# Patient Record
Sex: FEMALE | Race: WHITE | NOT HISPANIC OR LATINO | Employment: OTHER | URBAN - METROPOLITAN AREA
[De-identification: names, ages, dates, MRNs, and addresses within clinical notes are randomized per-mention and may not be internally consistent; named-entity substitution may affect disease eponyms.]

---

## 2017-02-06 ENCOUNTER — HOSPITAL ENCOUNTER (OUTPATIENT)
Dept: RADIOLOGY | Facility: HOSPITAL | Age: 67
Discharge: HOME | End: 2017-02-06
Attending: FAMILY MEDICINE
Payer: MEDICARE

## 2017-02-06 DIAGNOSIS — Z12.31 ENCOUNTER FOR SCREENING MAMMOGRAM FOR MALIGNANT NEOPLASM OF BREAST: ICD-10-CM

## 2017-02-06 PROCEDURE — 77067 SCR MAMMO BI INCL CAD: CPT | Mod: TC

## 2017-02-06 PROCEDURE — 77067 SCR MAMMO BI INCL CAD: CPT | Mod: 26,,, | Performed by: RADIOLOGY

## 2017-02-06 PROCEDURE — 77063 BREAST TOMOSYNTHESIS BI: CPT | Mod: 26,,, | Performed by: RADIOLOGY

## 2017-06-28 ENCOUNTER — OFFICE VISIT (OUTPATIENT)
Dept: ORTHOPEDICS | Facility: CLINIC | Age: 67
End: 2017-06-28
Payer: MEDICARE

## 2017-06-28 VITALS
DIASTOLIC BLOOD PRESSURE: 95 MMHG | WEIGHT: 148 LBS | HEIGHT: 62 IN | SYSTOLIC BLOOD PRESSURE: 156 MMHG | BODY MASS INDEX: 27.23 KG/M2 | HEART RATE: 75 BPM

## 2017-06-28 DIAGNOSIS — M23.301 LATERAL MENISCUS DERANGEMENT, LEFT: Primary | ICD-10-CM

## 2017-06-28 PROCEDURE — 73560 X-RAY EXAM OF KNEE 1 OR 2: CPT | Mod: LT,,, | Performed by: ORTHOPAEDIC SURGERY

## 2017-06-28 PROCEDURE — 1159F MED LIST DOCD IN RCRD: CPT | Mod: ,,, | Performed by: ORTHOPAEDIC SURGERY

## 2017-06-28 PROCEDURE — 99213 OFFICE O/P EST LOW 20 MIN: CPT | Mod: ,,, | Performed by: ORTHOPAEDIC SURGERY

## 2017-06-28 PROCEDURE — 1125F AMNT PAIN NOTED PAIN PRSNT: CPT | Mod: ,,, | Performed by: ORTHOPAEDIC SURGERY

## 2017-06-28 RX ORDER — PITAVASTATIN CALCIUM 2.09 MG/1
TABLET, FILM COATED ORAL
COMMUNITY
End: 2019-08-29

## 2017-06-28 RX ORDER — ESTRADIOL 0.5 MG/1
TABLET ORAL
Refills: 5 | COMMUNITY
Start: 2017-04-05 | End: 2019-09-11 | Stop reason: SDUPTHER

## 2017-06-28 RX ORDER — RISEDRONATE SODIUM 35 MG/1
TABLET, DELAYED RELEASE ORAL
COMMUNITY
Start: 2017-06-06 | End: 2019-09-11 | Stop reason: SDUPTHER

## 2017-06-28 NOTE — PROGRESS NOTES
Past Medical History:   Diagnosis Date    Hyperlipidemia     Osteopenia        Past Surgical History:   Procedure Laterality Date    basil cell carcinoma removal      HYSTERECTOMY      SINUS SURGERY         Current Outpatient Prescriptions   Medication Sig    estradiol (ESTRACE) 0.5 MG tablet TK 1 T PO QD    pitavastatin (LIVALO) 2 mg Tab tablet Take by mouth.    risedronate 35 mg TbEC      No current facility-administered medications for this visit.        Review of patient's allergies indicates:   Allergen Reactions    Alendronic acid     Hydrocodone-acetaminophen     Hydrocodone-cpm-pseudoephed     Ibandronic acid     Lovastatin     Oxycodone     Risedronic acid     Rosuvastatin        History reviewed. No pertinent family history.    Social History     Social History    Marital status:      Spouse name: N/A    Number of children: N/A    Years of education: N/A     Occupational History    Not on file.     Social History Main Topics    Smoking status: Former Smoker    Smokeless tobacco: Never Used    Alcohol use Yes      Comment: socially    Drug use: No    Sexual activity: Yes     Partners: Male     Other Topics Concern    Not on file     Social History Narrative    No narrative on file       Chief Complaint:   Chief Complaint   Patient presents with    Left Knee - Swelling, Pain, Injury     DOI: 06/27/2017; She was getting out of the truck that is high off the ground and her foot got caught on the door jam, and she felt pain that travels to the upper back thigh and lower part of the calf. She had pain for about 1 week before incident        Consulting Physician: No ref. provider found    History of Present Illness:    This is a 66 y.o. year old female who complains of Patient twisted her left knee yesterday getting out of a truck and now complains of severe pain 10 out of 10 with any flexion past 15°      ROS    Examination:    Vital Signs:    Vitals:    06/28/17 1052   BP: (!)  "156/95   Pulse: 75   Weight: 67.1 kg (148 lb)   Height: 5' 2" (1.575 m)   PainSc:   4   PainLoc: Knee       This a well-developed, well nourished patient in no acute distress.    Alert and oriented and cooperative to examination.       Physical Exam: Left Knee Exam    Gait   Normal    Skin  Rash:   None  Scars:   None    Inspection  Erythema:  None  Bruising:  None  Effusion:  Patient has a moderate effusion  Masses:  None  Lymphadenopathy: None    Range of Motion: atient has limited range of motion with only 15° of flexion and with severe lateral knee pain°    Medial Joint : None  Lateral Joint : None    Patellofemoral Tenderness: None  Patellofemoral Crepitus: None    Lachman:  Normal  Anterior Drawer: Normal  Posterior Drawer: Normal    Yoli's:  Positive with lateral knee pain  Apley's:  Negative    Varus Stress:  Stable  Valgus Stress:  Stable    Strength:  5/5    Pulses:  Palpable distal    Sensation:  Intact          Imaging: X-rays ordered and reviewed today x-ray examination of the left knee show some mild degenerative changes but no acute changes     Assessment: ossible lateral meniscal tear left knee    Plan:  ecommend MRI of the left knee      DISCLAIMER: This note may have been dictated using voice recognition software and may contain grammatical errors.     NOTE: Consult report sent to referring provider via [employer] EMR.  "

## 2017-07-10 ENCOUNTER — OFFICE VISIT (OUTPATIENT)
Dept: ORTHOPEDICS | Facility: CLINIC | Age: 67
End: 2017-07-10
Payer: MEDICARE

## 2017-07-10 VITALS
WEIGHT: 148 LBS | HEART RATE: 91 BPM | BODY MASS INDEX: 27.23 KG/M2 | DIASTOLIC BLOOD PRESSURE: 72 MMHG | HEIGHT: 62 IN | SYSTOLIC BLOOD PRESSURE: 122 MMHG

## 2017-07-10 DIAGNOSIS — M17.10 ARTHRITIS OF KNEE: Primary | ICD-10-CM

## 2017-07-10 DIAGNOSIS — M17.12 PRIMARY OSTEOARTHRITIS OF LEFT KNEE: ICD-10-CM

## 2017-07-10 PROCEDURE — 99213 OFFICE O/P EST LOW 20 MIN: CPT | Mod: 25,,, | Performed by: ORTHOPAEDIC SURGERY

## 2017-07-10 PROCEDURE — 1159F MED LIST DOCD IN RCRD: CPT | Mod: ,,, | Performed by: ORTHOPAEDIC SURGERY

## 2017-07-10 PROCEDURE — 20610 DRAIN/INJ JOINT/BURSA W/O US: CPT | Mod: LT,,, | Performed by: ORTHOPAEDIC SURGERY

## 2017-07-10 RX ORDER — TRIAMCINOLONE ACETONIDE 40 MG/ML
40 INJECTION, SUSPENSION INTRA-ARTICULAR; INTRAMUSCULAR
Status: DISCONTINUED | OUTPATIENT
Start: 2017-07-10 | End: 2017-07-10 | Stop reason: HOSPADM

## 2017-07-10 RX ADMIN — TRIAMCINOLONE ACETONIDE 40 MG: 40 INJECTION, SUSPENSION INTRA-ARTICULAR; INTRAMUSCULAR at 10:07

## 2017-07-10 NOTE — PROGRESS NOTES
"Past Medical History:   Diagnosis Date    Hyperlipidemia     Osteopenia        Past Surgical History:   Procedure Laterality Date    basil cell carcinoma removal      HYSTERECTOMY      SINUS SURGERY         Current Outpatient Prescriptions   Medication Sig    estradiol (ESTRACE) 0.5 MG tablet TK 1 T PO QD    pitavastatin (LIVALO) 2 mg Tab tablet Take by mouth.    risedronate 35 mg TbEC      No current facility-administered medications for this visit.        Review of patient's allergies indicates:   Allergen Reactions    Alendronic acid     Hydrocodone-acetaminophen     Hydrocodone-cpm-pseudoephed     Ibandronic acid     Lovastatin     Oxycodone     Risedronic acid     Rosuvastatin        History reviewed. No pertinent family history.    Social History     Social History    Marital status:      Spouse name: N/A    Number of children: N/A    Years of education: N/A     Occupational History    Not on file.     Social History Main Topics    Smoking status: Former Smoker    Smokeless tobacco: Never Used    Alcohol use Yes      Comment: socially    Drug use: No    Sexual activity: Yes     Partners: Male     Other Topics Concern    Not on file     Social History Narrative    No narrative on file       Chief Complaint:   Chief Complaint   Patient presents with    Results     Patient is here to review an MRI of the left knee performed on 7/3/17 @ [facility].       Consulting Physician: No ref. provider found    History of Present Illness:    This is a 66 y.o. year old female who complains of Patient returns for an MRI of her left knee she states her left knee pain is improving but she still complains of some swelling or pain level is 3 out of 10      ROS    Examination:    Vital Signs:    Vitals:    07/10/17 0955   BP: 122/72   Pulse: 91   Weight: 67.1 kg (148 lb)   Height: 5' 2" (1.575 m)       This a well-developed, well nourished patient in no acute distress.    Alert and oriented and " cooperative to examination.       Physical Exam:  Left Knee Exam    Gait   Normal    Skin  Rash:   None  Scars:   None    Inspection  Erythema:  None  Bruising:  None  Effusion:  patient does have a moderate effusion  Masses:  None  Lymphadenopathy: None    Range of Motion: 0 to 130°    Medial Joint : None  Lateral Joint : Improving lateral joint line tenderness    Patellofemoral Tenderness: None  Patellofemoral Crepitus: None    Lachman:  Normal  Anterior Drawer: Normal  Posterior Drawer: Normal    Yoli's:  Negative  Apley's:  Negative    Varus Stress:  Stable  Valgus Stress:  Stable    Strength:  5/5    Pulses:  Palpable distal    Sensation:  Intact          Imaging: X-rays ordered and reviewed today an MRI report of the left knee today shows moderate arthritic changes of the medial compartment of her knee and a horizontal cleavage tear and posterior root medial meniscal tear     Assessment: medial meniscal tear and moderate arthritis of the left knee    Plan:  I'm going to aspirate her left knee and injected with Kenalog and see her back in about 4 weeks if she shows no improvement I would recommend we scoped her knee      DISCLAIMER: This note may have been dictated using voice recognition software and may contain grammatical errors.     NOTE: Consult report sent to referring provider via [employer].

## 2017-07-10 NOTE — PROCEDURES
Large Joint Aspiration/Injection  Date/Time: 7/10/2017 10:16 AM  Performed by: SHARIFA ARAGON  Authorized by: SHARIFA ARAGON     Consent Done?:  Yes (Verbal)  Indications:  Pain and joint swelling  Procedure site marked: Yes    Timeout: Prior to procedure the correct patient, procedure, and site was verified      Location:  Knee  Site:  L knee  Prep: Patient was prepped and draped in usual sterile fashion    Needle size:  22 G  Approach:  Lateral  Medications:  40 mg triamcinolone acetonide 40 mg/mL  Patient tolerance:  Patient tolerated the procedure well with no immediate complications

## 2017-08-07 ENCOUNTER — OFFICE VISIT (OUTPATIENT)
Dept: ORTHOPEDICS | Facility: CLINIC | Age: 67
End: 2017-08-07
Payer: MEDICARE

## 2017-08-07 VITALS
HEART RATE: 82 BPM | SYSTOLIC BLOOD PRESSURE: 141 MMHG | HEIGHT: 62 IN | DIASTOLIC BLOOD PRESSURE: 86 MMHG | BODY MASS INDEX: 27.23 KG/M2 | WEIGHT: 148 LBS

## 2017-08-07 DIAGNOSIS — M17.12 PRIMARY OSTEOARTHRITIS OF LEFT KNEE: Primary | ICD-10-CM

## 2017-08-07 DIAGNOSIS — M23.205 OTHER OLD TEAR OF MEDIAL MENISCUS OF KNEE, UNSPECIFIED LATERALITY: ICD-10-CM

## 2017-08-07 PROCEDURE — 3008F BODY MASS INDEX DOCD: CPT | Mod: ,,, | Performed by: ORTHOPAEDIC SURGERY

## 2017-08-07 PROCEDURE — 99213 OFFICE O/P EST LOW 20 MIN: CPT | Mod: ,,, | Performed by: ORTHOPAEDIC SURGERY

## 2017-08-07 PROCEDURE — 1159F MED LIST DOCD IN RCRD: CPT | Mod: ,,, | Performed by: ORTHOPAEDIC SURGERY

## 2017-08-07 PROCEDURE — 1125F AMNT PAIN NOTED PAIN PRSNT: CPT | Mod: ,,, | Performed by: ORTHOPAEDIC SURGERY

## 2017-08-07 NOTE — PROGRESS NOTES
Past Medical History:   Diagnosis Date    Hyperlipidemia     Osteopenia        Past Surgical History:   Procedure Laterality Date    basil cell carcinoma removal      HYSTERECTOMY      SINUS SURGERY         Current Outpatient Prescriptions   Medication Sig    estradiol (ESTRACE) 0.5 MG tablet TK 1 T PO QD    pitavastatin (LIVALO) 2 mg Tab tablet Take by mouth.    risedronate 35 mg TbEC      No current facility-administered medications for this visit.        Review of patient's allergies indicates:   Allergen Reactions    Alendronic acid     Hydrocodone-acetaminophen     Hydrocodone-cpm-pseudoephed     Ibandronic acid     Lovastatin     Oxycodone     Risedronic acid     Rosuvastatin        History reviewed. No pertinent family history.    Social History     Social History    Marital status:      Spouse name: N/A    Number of children: N/A    Years of education: N/A     Occupational History    Not on file.     Social History Main Topics    Smoking status: Former Smoker    Smokeless tobacco: Never Used    Alcohol use Yes      Comment: socially    Drug use: No    Sexual activity: Yes     Partners: Male     Other Topics Concern    Not on file     Social History Narrative    No narrative on file       Chief Complaint:   Chief Complaint   Patient presents with    Left Knee - Follow-up, Pain     DOI: 06/27/2017; She was getting out of the truck that is high off the ground and her foot got caught on the door jam, and she felt pain that travels to the upper back thigh and lower part of the calf. She had pain for about 1 week before incident     Follow-up     She states her left knee is doing better than it was, and only hurts if she turns a certain direction.       Consulting Physician: No ref. provider found    History of Present Illness:    This is a 67 y.o. year old female who complains of Patient returns today after receiving an aspiration injection to her left knee she states her pain  "is much improved pain level is 1-2 out of 10      ROS    Examination:    Vital Signs:    Vitals:    08/07/17 0946   BP: (!) 141/86   Pulse: 82   Weight: 67.1 kg (148 lb)   Height: 5' 2" (1.575 m)   PainSc:   1       This a well-developed, well nourished patient in no acute distress.    Alert and oriented and cooperative to examination.       Physical Exam: Left Knee Exam    Gait   Normal    Skin  Rash:   None  Scars:   None    Inspection  Erythema:  None  Bruising:  None  Effusion:  None  Masses:  None  Lymphadenopathy: None    Range of Motion: 0 to 130° with pain    Medial Joint : ildly positive  Lateral Joint : negative    Patellofemoral Tenderness: None  Patellofemoral Crepitus: None    Lachman:  Normal  Anterior Drawer: Normal  Posterior Drawer: Normal    Yoli's:  Positive with medial knee pain  Apley's:  egative    Varus Stress:  Stable  Valgus Stress:  Stable    Strength:  5/5    Pulses:  Palpable distal    Sensation:  Intact          Imaging: X-rays ordered and reviewed today o x-ray done today     Assessment: moderate arthritis of the left knee with medial meniscal tear    Plan:  Patient responded well to the injection her pain level is1-2 out of 10 the patient is taking Aleve as needed for pain she will return as needed      DISCLAIMER: This note may have been dictated using voice recognition software and may contain grammatical errors.     NOTE: Consult report sent to referring provider via [employer] EMR.  "

## 2018-02-07 DIAGNOSIS — Z12.31 ENCOUNTER FOR SCREENING MAMMOGRAM FOR MALIGNANT NEOPLASM OF BREAST: Primary | ICD-10-CM

## 2018-02-14 ENCOUNTER — HOSPITAL ENCOUNTER (OUTPATIENT)
Dept: RADIOLOGY | Facility: HOSPITAL | Age: 68
Discharge: HOME | End: 2018-02-14
Attending: FAMILY MEDICINE
Payer: MEDICARE

## 2018-02-14 DIAGNOSIS — Z12.31 ENCOUNTER FOR SCREENING MAMMOGRAM FOR MALIGNANT NEOPLASM OF BREAST: ICD-10-CM

## 2018-02-14 PROCEDURE — 77067 SCR MAMMO BI INCL CAD: CPT | Mod: TC

## 2018-08-22 ENCOUNTER — OFFICE VISIT (OUTPATIENT)
Dept: DERMATOLOGY | Facility: CLINIC | Age: 68
End: 2018-08-22
Payer: MEDICARE

## 2018-08-22 VITALS — BODY MASS INDEX: 27.23 KG/M2 | WEIGHT: 148 LBS | HEIGHT: 62 IN

## 2018-08-22 DIAGNOSIS — L57.0 MULTIPLE ACTINIC KERATOSES: Primary | ICD-10-CM

## 2018-08-22 DIAGNOSIS — Z85.828 PERSONAL HISTORY OF OTHER MALIGNANT NEOPLASM OF SKIN: ICD-10-CM

## 2018-08-22 DIAGNOSIS — L82.1 SEBORRHEIC KERATOSES: ICD-10-CM

## 2018-08-22 DIAGNOSIS — L57.8 SUN-DAMAGED SKIN: ICD-10-CM

## 2018-08-22 DIAGNOSIS — Z12.83 SKIN CANCER SCREENING: ICD-10-CM

## 2018-08-22 PROCEDURE — 17000 DESTRUCT PREMALG LESION: CPT | Mod: S$GLB,,, | Performed by: DERMATOLOGY

## 2018-08-22 PROCEDURE — 17003 DESTRUCT PREMALG LES 2-14: CPT | Mod: S$GLB,,, | Performed by: DERMATOLOGY

## 2018-08-22 PROCEDURE — 99999 PR PBB SHADOW E&M-EST. PATIENT-LVL III: CPT | Mod: PBBFAC,,, | Performed by: DERMATOLOGY

## 2018-08-22 PROCEDURE — 99203 OFFICE O/P NEW LOW 30 MIN: CPT | Mod: 25,S$GLB,, | Performed by: DERMATOLOGY

## 2018-08-22 RX ORDER — AZELASTINE 1 MG/ML
1 SPRAY, METERED NASAL 2 TIMES DAILY
COMMUNITY
End: 2020-03-11 | Stop reason: SDUPTHER

## 2018-08-22 RX ORDER — LATANOPROST/PF 0.005 %
DROPS OPHTHALMIC (EYE)
COMMUNITY

## 2019-02-07 DIAGNOSIS — Z12.31 ENCOUNTER FOR SCREENING MAMMOGRAM FOR MALIGNANT NEOPLASM OF BREAST: Primary | ICD-10-CM

## 2019-02-13 ENCOUNTER — OFFICE VISIT (OUTPATIENT)
Dept: DERMATOLOGY | Facility: CLINIC | Age: 69
End: 2019-02-13
Payer: MEDICARE

## 2019-02-13 VITALS — HEIGHT: 62 IN | BODY MASS INDEX: 27.23 KG/M2 | WEIGHT: 148 LBS

## 2019-02-13 DIAGNOSIS — L81.1 MELASMA: ICD-10-CM

## 2019-02-13 DIAGNOSIS — Z12.83 SKIN CANCER SCREENING: ICD-10-CM

## 2019-02-13 DIAGNOSIS — L57.0 ACTINIC KERATOSIS: Primary | ICD-10-CM

## 2019-02-13 DIAGNOSIS — Z85.828 PERSONAL HISTORY OF OTHER MALIGNANT NEOPLASM OF SKIN: ICD-10-CM

## 2019-02-13 DIAGNOSIS — Z87.2 HISTORY OF ACTINIC KERATOSES: ICD-10-CM

## 2019-02-13 PROCEDURE — 17000 DESTRUCT PREMALG LESION: CPT | Mod: S$GLB,,, | Performed by: DERMATOLOGY

## 2019-02-13 PROCEDURE — 1101F PT FALLS ASSESS-DOCD LE1/YR: CPT | Mod: CPTII,S$GLB,, | Performed by: DERMATOLOGY

## 2019-02-13 PROCEDURE — 99999 PR PBB SHADOW E&M-EST. PATIENT-LVL II: ICD-10-PCS | Mod: PBBFAC,,, | Performed by: DERMATOLOGY

## 2019-02-13 PROCEDURE — 1101F PR PT FALLS ASSESS DOC 0-1 FALLS W/OUT INJ PAST YR: ICD-10-PCS | Mod: CPTII,S$GLB,, | Performed by: DERMATOLOGY

## 2019-02-13 PROCEDURE — 99999 PR PBB SHADOW E&M-EST. PATIENT-LVL II: CPT | Mod: PBBFAC,,, | Performed by: DERMATOLOGY

## 2019-02-13 PROCEDURE — 99214 PR OFFICE/OUTPT VISIT, EST, LEVL IV, 30-39 MIN: ICD-10-PCS | Mod: 25,S$GLB,, | Performed by: DERMATOLOGY

## 2019-02-13 PROCEDURE — 17000 PR DESTRUCTION(LASER SURGERY,CRYOSURGERY,CHEMOSURGERY),PREMALIGNANT LESIONS,FIRST LESION: ICD-10-PCS | Mod: S$GLB,,, | Performed by: DERMATOLOGY

## 2019-02-13 PROCEDURE — 99214 OFFICE O/P EST MOD 30 MIN: CPT | Mod: 25,S$GLB,, | Performed by: DERMATOLOGY

## 2019-02-13 NOTE — PROGRESS NOTES
Subjective:       Patient ID:  Shahana Mcknight is a 68 y.o. female who presents for   Chief Complaint   Patient presents with    Actinic Keratosis     Follow up R temple area     HPI     Last o/v 8/22/2018  Hx of BCC x's 2 on face within six months of each other treated with MOHS (Dr. Martinez) and plastics to closed next day Nov 2005- Venkatesh Montero   Hx of BCC right leg treated with surgery by derm in [place] about 4 years ago.    Here today to follow up on AK's on the face, s/p cryo at last visit. Resolved. no tx to site currently.    C/o discoloration above upper lip. Months. Wears sunscreen daily. Asx.     Review of Systems   Constitutional: Negative for fever, chills and fatigue.   Skin: Positive for daily sunscreen use, activity-related sunscreen use and wears hat.   Hematologic/Lymphatic: Bruises/bleeds easily.        Objective:    Physical Exam   Constitutional: She appears well-developed and well-nourished. No distress.   Eyes: Lids are normal.  No conjunctival no injection.   Cardiovascular: There is no local extremity swelling and no dependent edema.     Neurological: She is alert and oriented to person, place, and time. She is not disoriented.   Psychiatric: She has a normal mood and affect.   Skin:   Areas Examined (abnormalities noted in diagram):   Head / Face Inspection Performed  Neck Inspection Performed  Chest / Axilla Inspection Performed  Back Inspection Performed  RUE Inspected  LUE Inspection Performed  RLE Inspected  LLE Inspection Performed                   Diagram Legend     Erythematous scaling macule/papule c/w actinic keratosis       Vascular papule c/w angioma      Pigmented verrucoid papule/plaque c/w seborrheic keratosis      Yellow umbilicated papule c/w sebaceous hyperplasia      Irregularly shaped tan macule c/w lentigo     1-2 mm smooth white papules consistent with Milia      Movable subcutaneous cyst with punctum c/w epidermal inclusion cyst      Subcutaneous movable cyst c/w  pilar cyst      Firm pink to brown papule c/w dermatofibroma      Pedunculated fleshy papule(s) c/w skin tag(s)      Evenly pigmented macule c/w junctional nevus     Mildly variegated pigmented, slightly irregular-bordered macule c/w mildly atypical nevus      Flesh colored to evenly pigmented papule c/w intradermal nevus       Pink pearly papule/plaque c/w basal cell carcinoma      Erythematous hyperkeratotic cursted plaque c/w SCC      Surgical scar with no sign of skin cancer recurrence      Open and closed comedones      Inflammatory papules and pustules      Verrucoid papule consistent consistent with wart     Erythematous eczematous patches and plaques     Dystrophic onycholytic nail with subungual debris c/w onychomycosis     Umbilicated papule    Erythematous-base heme-crusted tan verrucoid plaque consistent with inflamed seborrheic keratosis     Erythematous Silvery Scaling Plaque c/w Psoriasis     See annotation      Assessment / Plan:        Actinic keratosis  Right arm  Cryosurgery Procedure Note    Verbal consent from the patient is obtained and the patient is aware of the precancerous quality and need for treatment of these lesions. Liquid nitrogen cryosurgery is applied to the 1 actinic keratoses, as detailed in the physical exam, to produce a freeze injury. The patient is aware that blisters may form and is instructed on wound care with gentle cleansing and use of vaseline ointment to keep moist until healed. The patient is supplied a handout on cryosurgery and is instructed to call if lesions do not completely resolve. Discussed risk postinflammatory pigmentary changes.     Personal history of other malignant neoplasm of skin  Area(s) of previous NMSC evaluated with no signs of recurrence.    Upper body skin examination performed today including at least 6 points as noted in physical examination. No lesions suspicious for malignancy noted.      Skin cancer screening  Area(s) of previous NMSC  evaluated with no signs of recurrence.    Upper body skin examination performed today including at least 6 points as noted in physical examination. No lesions suspicious for malignancy noted.    Melasma  Melasma ([id number]-[id number]-[id number]) is a common skin problem. It causes brown to gray-brown patches on the face. Most people get it on their cheeks, bridge of their nose, forehead, chin, and above their upper lip. It also can appear on other parts of the body that get lots of sun, such as the forearms and neck.  Although the exact causes of melasma are unclear, common triggers include sun exposure, pregnancy, birth control pills, and cosmetics.     If you have melasma, dermatologists recommend the following tips for achieving a more even skin tone:   1. Wear sunscreen daily: One of the most common treatments for melasma is sun protection. Since sunlight triggers melasma, it is important to wear sunscreen every day, even on cloudy days and after swimming or sweating. Choose a sunscreen that offers broad-spectrum protection, a Sun Protection Factor (SPF) of 30 or more, and zinc oxide and/or titanium dioxide to physically limit the effects of the suns rays on your skin. Apply sunscreen 15 minutes before going outside and reapply at least every two hours.  2. Wear a wide-brimmed hat when youre outside: As a recent study in the journal Nature illustrates, sunscreen alone may not give you the sun protection you need. Whenever possible, seek shade and wear protective clothing in addition to applying sunscreen.  3. Choose gentle skin care products: Choose skin care products that dont sting or burn, as products that irritate the skin may worsen melasma.   4. Avoid waxing: Waxing may cause skin inflammation which can worsen melasma, so its important to avoid waxing areas of the body affected by the condition.    Under a dermatologists care, many people with melasma have a good outcome. Melasma can be stubborn, though. It may take a  few months of treatment to see improvement. It is important to follow your dermatologists advice. This ensures that you get the most benefit from treatment. It also can help avoid skin irritation and other side effects.         Recommend trial OTC hydroquinone. Recommend no more than 3 consecutive months.     History of actinic keratoses    Lesions tx with cryo at last visit resolved.          Follow-up in about 6 months (around 8/13/2019).

## 2019-02-18 ENCOUNTER — HOSPITAL ENCOUNTER (OUTPATIENT)
Dept: RADIOLOGY | Facility: HOSPITAL | Age: 69
Discharge: HOME | End: 2019-02-18
Attending: FAMILY MEDICINE
Payer: MEDICARE

## 2019-02-18 DIAGNOSIS — Z12.31 ENCOUNTER FOR SCREENING MAMMOGRAM FOR MALIGNANT NEOPLASM OF BREAST: ICD-10-CM

## 2019-02-18 PROCEDURE — 77063 MAMMO DIGITAL SCREENING BILAT WITH TOMOSYNTHESIS_CAD: ICD-10-PCS | Mod: 26,,, | Performed by: RADIOLOGY

## 2019-02-18 PROCEDURE — 77067 SCR MAMMO BI INCL CAD: CPT | Mod: 26,,, | Performed by: RADIOLOGY

## 2019-02-18 PROCEDURE — 77067 SCR MAMMO BI INCL CAD: CPT | Mod: TC

## 2019-02-18 PROCEDURE — 77067 MAMMO DIGITAL SCREENING BILAT WITH TOMOSYNTHESIS_CAD: ICD-10-PCS | Mod: 26,,, | Performed by: RADIOLOGY

## 2019-02-18 PROCEDURE — 77063 BREAST TOMOSYNTHESIS BI: CPT | Mod: 26,,, | Performed by: RADIOLOGY

## 2019-07-30 ENCOUNTER — OFFICE VISIT (OUTPATIENT)
Dept: ORTHOPEDICS | Facility: CLINIC | Age: 69
End: 2019-07-30
Payer: MEDICARE

## 2019-07-30 ENCOUNTER — HOSPITAL ENCOUNTER (OUTPATIENT)
Dept: RADIOLOGY | Facility: HOSPITAL | Age: 69
Discharge: HOME | End: 2019-07-30
Attending: ORTHOPAEDIC SURGERY
Payer: MEDICARE

## 2019-07-30 VITALS
HEIGHT: 62 IN | HEART RATE: 75 BPM | SYSTOLIC BLOOD PRESSURE: 145 MMHG | BODY MASS INDEX: 27.23 KG/M2 | WEIGHT: 148 LBS | DIASTOLIC BLOOD PRESSURE: 79 MMHG

## 2019-07-30 DIAGNOSIS — M79.672 LEFT FOOT PAIN: Primary | ICD-10-CM

## 2019-07-30 DIAGNOSIS — M79.605 LEFT LEG PAIN: Primary | ICD-10-CM

## 2019-07-30 DIAGNOSIS — M79.672 LEFT FOOT PAIN: ICD-10-CM

## 2019-07-30 DIAGNOSIS — M79.605 LEFT LEG PAIN: ICD-10-CM

## 2019-07-30 PROCEDURE — 99213 OFFICE O/P EST LOW 20 MIN: CPT | Mod: S$GLB,,, | Performed by: ORTHOPAEDIC SURGERY

## 2019-07-30 PROCEDURE — 99213 PR OFFICE/OUTPT VISIT, EST, LEVL III, 20-29 MIN: ICD-10-PCS | Mod: S$GLB,,, | Performed by: ORTHOPAEDIC SURGERY

## 2019-07-30 PROCEDURE — 73630 XR FOOT COMPLETE 3 VIEW LEFT: ICD-10-PCS | Mod: 26,LT,, | Performed by: RADIOLOGY

## 2019-07-30 PROCEDURE — 99999 PR PBB SHADOW E&M-EST. PATIENT-LVL III: ICD-10-PCS | Mod: PBBFAC,,, | Performed by: ORTHOPAEDIC SURGERY

## 2019-07-30 PROCEDURE — 93971 US LOWER EXTREMITY VEINS LEFT: ICD-10-PCS | Mod: 26,LT,, | Performed by: RADIOLOGY

## 2019-07-30 PROCEDURE — 93971 EXTREMITY STUDY: CPT | Mod: 26,LT,, | Performed by: RADIOLOGY

## 2019-07-30 PROCEDURE — 99999 PR PBB SHADOW E&M-EST. PATIENT-LVL III: CPT | Mod: PBBFAC,,, | Performed by: ORTHOPAEDIC SURGERY

## 2019-07-30 PROCEDURE — 73630 X-RAY EXAM OF FOOT: CPT | Mod: 26,LT,, | Performed by: RADIOLOGY

## 2019-07-30 PROCEDURE — 1101F PR PT FALLS ASSESS DOC 0-1 FALLS W/OUT INJ PAST YR: ICD-10-PCS | Mod: CPTII,S$GLB,, | Performed by: ORTHOPAEDIC SURGERY

## 2019-07-30 PROCEDURE — 1101F PT FALLS ASSESS-DOCD LE1/YR: CPT | Mod: CPTII,S$GLB,, | Performed by: ORTHOPAEDIC SURGERY

## 2019-07-30 PROCEDURE — 93971 EXTREMITY STUDY: CPT | Mod: TC,LT

## 2019-07-30 PROCEDURE — 73630 X-RAY EXAM OF FOOT: CPT | Mod: TC,PN,LT

## 2019-07-30 RX ORDER — HYDROCHLOROTHIAZIDE 12.5 MG/1
TABLET ORAL
Refills: 3 | COMMUNITY
Start: 2019-05-12 | End: 2021-03-17 | Stop reason: SDUPTHER

## 2019-07-30 NOTE — PROGRESS NOTES
Past Medical History:   Diagnosis Date    Basal cell carcinoma     Hyperlipidemia     Osteopenia        Past Surgical History:   Procedure Laterality Date    basil cell carcinoma removal      BREAST CYST ASPIRATION      HYSTERECTOMY      SINUS SURGERY         Current Outpatient Medications   Medication Sig    azelastine (ASTELIN) 137 mcg (0.1 %) nasal spray 1 spray by Nasal route 2 (two) times daily.    estradiol (ESTRACE) 0.5 MG tablet TK 1 T PO QD    hydroCHLOROthiazide (HYDRODIURIL) 12.5 MG Tab     latanoprost, PF, 0.005 % Drop Apply to eye.    pitavastatin (LIVALO) 2 mg Tab tablet Take by mouth.    risedronate 35 mg TbEC      No current facility-administered medications for this visit.        Review of patient's allergies indicates:   Allergen Reactions    Alendronic acid     Hydrocodone-acetaminophen     Hydrocodone-cpm-pseudoephed     Ibandronic acid     Lovastatin     Oxycodone     Risedronic acid     Rosuvastatin        History reviewed. No pertinent family history.    Social History     Socioeconomic History    Marital status:      Spouse name: Not on file    Number of children: Not on file    Years of education: Not on file    Highest education level: Not on file   Occupational History    Not on file   Social Needs    Financial resource strain: Not on file    Food insecurity:     Worry: Not on file     Inability: Not on file    Transportation needs:     Medical: Not on file     Non-medical: Not on file   Tobacco Use    Smoking status: Former Smoker    Smokeless tobacco: Never Used   Substance and Sexual Activity    Alcohol use: Yes     Comment: socially    Drug use: No    Sexual activity: Yes     Partners: Male   Lifestyle    Physical activity:     Days per week: Not on file     Minutes per session: Not on file    Stress: Not on file   Relationships    Social connections:     Talks on phone: Not on file     Gets together: Not on file     Attends Baptism  "service: Not on file     Active member of club or organization: Not on file     Attends meetings of clubs or organizations: Not on file     Relationship status: Not on file   Other Topics Concern    Are you pregnant or think you may be? Not Asked    Breast-feeding Not Asked   Social History Narrative    Not on file       Chief Complaint:   Chief Complaint   Patient presents with    Left Foot - Injury     DOI: About 2 months ago she hit her left foot on the bottom side of a rocker chair. She states she had swelling and bruising. She used Ice, and Aleve. She has a little soreness now when wearing Tennis shoes.        Consulting Physician: No ref. provider found    History of Present Illness:    This is a 69 y.o. year old female who complains of patient has about a 2 month history of left foot pain she had her foot on the bottom or rocking chair by 2 months ago was having some pain and swelling      ROS    Examination:    Vital Signs:    Vitals:    07/30/19 1059   BP: (!) 145/79   Pulse: 75   Weight: 67.1 kg (148 lb)   Height: 5' 2" (1.575 m)   PainSc: 0-No pain   PainLoc: Foot       This a well-developed, well nourished patient in no acute distress.    Alert and oriented and cooperative to examination.       Physical Exam:  Left foot-patient has the palpable edema in her lower extremity and her left lower extremity is bigger than the right patient does not really localize any pain she does appear to have a negative Ananda sign there is a small palpable mass in the soft tissues in the posterior aspect of the distal calf    Imaging:  AP lateral oblique x-rays of the left foot show no evidence of any fractures     Assessment:  Possible DVT to the left lower extremity    Plan:  We will get a venous Doppler  Of the left lower extremity she has also been encouraged to go ahead and get some below knee support stockings and will see her back in 1 week    DISCLAIMER: This note may have been dictated using voice recognition " software and may contain grammatical errors.     NOTE: Consult report sent to referring provider via [employer] EMR.

## 2019-08-09 ENCOUNTER — OFFICE VISIT (OUTPATIENT)
Dept: ORTHOPEDICS | Facility: CLINIC | Age: 69
End: 2019-08-09
Payer: MEDICARE

## 2019-08-09 VITALS
SYSTOLIC BLOOD PRESSURE: 166 MMHG | BODY MASS INDEX: 27.23 KG/M2 | HEART RATE: 65 BPM | HEIGHT: 62 IN | DIASTOLIC BLOOD PRESSURE: 80 MMHG | WEIGHT: 148 LBS

## 2019-08-09 DIAGNOSIS — M79.672 LEFT FOOT PAIN: Primary | ICD-10-CM

## 2019-08-09 PROCEDURE — 99999 PR PBB SHADOW E&M-EST. PATIENT-LVL III: CPT | Mod: PBBFAC,,, | Performed by: ORTHOPAEDIC SURGERY

## 2019-08-09 PROCEDURE — 1101F PT FALLS ASSESS-DOCD LE1/YR: CPT | Mod: CPTII,S$GLB,, | Performed by: ORTHOPAEDIC SURGERY

## 2019-08-09 PROCEDURE — 99999 PR PBB SHADOW E&M-EST. PATIENT-LVL III: ICD-10-PCS | Mod: PBBFAC,,, | Performed by: ORTHOPAEDIC SURGERY

## 2019-08-09 PROCEDURE — 99213 OFFICE O/P EST LOW 20 MIN: CPT | Mod: S$GLB,,, | Performed by: ORTHOPAEDIC SURGERY

## 2019-08-09 PROCEDURE — 99213 PR OFFICE/OUTPT VISIT, EST, LEVL III, 20-29 MIN: ICD-10-PCS | Mod: S$GLB,,, | Performed by: ORTHOPAEDIC SURGERY

## 2019-08-09 PROCEDURE — 1101F PR PT FALLS ASSESS DOC 0-1 FALLS W/OUT INJ PAST YR: ICD-10-PCS | Mod: CPTII,S$GLB,, | Performed by: ORTHOPAEDIC SURGERY

## 2019-08-09 NOTE — PROGRESS NOTES
Past Medical History:   Diagnosis Date    Basal cell carcinoma     Hyperlipidemia     Osteopenia        Past Surgical History:   Procedure Laterality Date    basil cell carcinoma removal      BREAST CYST ASPIRATION      HYSTERECTOMY      SINUS SURGERY         Current Outpatient Medications   Medication Sig    azelastine (ASTELIN) 137 mcg (0.1 %) nasal spray 1 spray by Nasal route 2 (two) times daily.    estradiol (ESTRACE) 0.5 MG tablet TK 1 T PO QD    estradiol (EVAMIST TD) Take 0.5 mg by mouth.    hydroCHLOROthiazide (HYDRODIURIL) 12.5 MG Tab     latanoprost, PF, 0.005 % Drop Apply to eye.    pitavastatin (LIVALO) 2 mg Tab tablet Take by mouth.    risedronate 35 mg TbEC      No current facility-administered medications for this visit.        Review of patient's allergies indicates:   Allergen Reactions    Alendronic acid     Hydrocodone-acetaminophen     Hydrocodone-cpm-pseudoephed     Ibandronic acid     Lovastatin     Oxycodone     Risedronic acid     Rosuvastatin        History reviewed. No pertinent family history.    Social History     Socioeconomic History    Marital status:      Spouse name: Not on file    Number of children: Not on file    Years of education: Not on file    Highest education level: Not on file   Occupational History    Not on file   Social Needs    Financial resource strain: Not on file    Food insecurity:     Worry: Not on file     Inability: Not on file    Transportation needs:     Medical: Not on file     Non-medical: Not on file   Tobacco Use    Smoking status: Former Smoker    Smokeless tobacco: Never Used   Substance and Sexual Activity    Alcohol use: Yes     Comment: socially    Drug use: No    Sexual activity: Yes     Partners: Male   Lifestyle    Physical activity:     Days per week: Not on file     Minutes per session: Not on file    Stress: Not on file   Relationships    Social connections:     Talks on phone: Not on file     Gets  "together: Not on file     Attends [employer] service: Not on file     Active member of club or organization: Not on file     Attends meetings of clubs or organizations: Not on file     Relationship status: Not on file   Other Topics Concern    Are you pregnant or think you may be? Not Asked    Breast-feeding Not Asked   Social History Narrative    Not on file       Chief Complaint:   Chief Complaint   Patient presents with    Left Foot - Pain, Follow-up     Left foot showed no evidence of any fractures. Here for Review of Doppler Left Lower Extremity.     Foot Pain     Pt. stated she is doing much better. Swelling decreased. Not experiencing any pain.        Consulting Physician: No ref. provider found    History of Present Illness:    This is a 69 y.o. year old female who complains of patient is following up today with a history of left foot pain and swelling in her lower extremity she had a recent Doppler study to rule out a DVT the Doppler study was negative for DVT patient states she is feeling much better and the swelling is decreasing and she is not experiencing any pain      ROS    Examination:    Vital Signs:    Vitals:    08/09/19 0924   BP: (!) 166/80   Pulse: 65   Weight: 67.1 kg (148 lb)   Height: 5' 2" (1.575 m)   PainSc: 0-No pain   PainLoc: Foot       This a well-developed, well nourished patient in no acute distress.    Alert and oriented and cooperative to examination.       Physical Exam:  Left leg-patient is wearing a compression stocking below the knee and is doing a good job she has no edema in the leg today and it looks normal she has a small thickening in the posterior aspect of the distal calf area in the soft tissues and also appears to be going down in size        Left foot-there is no swelling in the left foot today and she has no pain    Imaging:  Patient had a venous Doppler which was negative for DVT in the left lower extremity     Assessment:  Left foot pain resolving and edema in " the lower leg resolving with compression stocking no evidence of any deep vein thrombosis    Plan:  Patient has been encouraged to use though stocking and elevate her legs and return as needed      DISCLAIMER: This note may have been dictated using voice recognition software and may contain grammatical errors.     NOTE: Consult report sent to referring provider via [employer] EMR.

## 2019-08-14 ENCOUNTER — OFFICE VISIT (OUTPATIENT)
Dept: DERMATOLOGY | Facility: CLINIC | Age: 69
End: 2019-08-14
Payer: MEDICARE

## 2019-08-14 VITALS — WEIGHT: 147.94 LBS | BODY MASS INDEX: 27.22 KG/M2 | HEIGHT: 62 IN

## 2019-08-14 DIAGNOSIS — Z12.83 SKIN CANCER SCREENING: ICD-10-CM

## 2019-08-14 DIAGNOSIS — L57.0 ACTINIC KERATOSES: ICD-10-CM

## 2019-08-14 DIAGNOSIS — Z85.828 PERSONAL HISTORY OF OTHER MALIGNANT NEOPLASM OF SKIN: Primary | ICD-10-CM

## 2019-08-14 DIAGNOSIS — L82.1 SEBORRHEIC KERATOSES: ICD-10-CM

## 2019-08-14 DIAGNOSIS — L82.0 INFLAMED SEBORRHEIC KERATOSIS: ICD-10-CM

## 2019-08-14 PROCEDURE — 17003 DESTRUCT PREMALG LES 2-14: CPT | Mod: S$GLB,,, | Performed by: DERMATOLOGY

## 2019-08-14 PROCEDURE — 17000 PR DESTRUCTION(LASER SURGERY,CRYOSURGERY,CHEMOSURGERY),PREMALIGNANT LESIONS,FIRST LESION: ICD-10-PCS | Mod: S$GLB,,, | Performed by: DERMATOLOGY

## 2019-08-14 PROCEDURE — 99213 OFFICE O/P EST LOW 20 MIN: CPT | Mod: 25,S$GLB,, | Performed by: DERMATOLOGY

## 2019-08-14 PROCEDURE — 17000 DESTRUCT PREMALG LESION: CPT | Mod: S$GLB,,, | Performed by: DERMATOLOGY

## 2019-08-14 PROCEDURE — 99999 PR PBB SHADOW E&M-EST. PATIENT-LVL II: CPT | Mod: PBBFAC,,, | Performed by: DERMATOLOGY

## 2019-08-14 PROCEDURE — 1101F PT FALLS ASSESS-DOCD LE1/YR: CPT | Mod: CPTII,S$GLB,, | Performed by: DERMATOLOGY

## 2019-08-14 PROCEDURE — 1101F PR PT FALLS ASSESS DOC 0-1 FALLS W/OUT INJ PAST YR: ICD-10-PCS | Mod: CPTII,S$GLB,, | Performed by: DERMATOLOGY

## 2019-08-14 PROCEDURE — 99999 PR PBB SHADOW E&M-EST. PATIENT-LVL II: ICD-10-PCS | Mod: PBBFAC,,, | Performed by: DERMATOLOGY

## 2019-08-14 PROCEDURE — 17003 DESTRUCTION, PREMALIGNANT LESIONS; SECOND THROUGH 14 LESIONS: ICD-10-PCS | Mod: S$GLB,,, | Performed by: DERMATOLOGY

## 2019-08-14 PROCEDURE — 99213 PR OFFICE/OUTPT VISIT, EST, LEVL III, 20-29 MIN: ICD-10-PCS | Mod: 25,S$GLB,, | Performed by: DERMATOLOGY

## 2019-08-14 NOTE — PROGRESS NOTES
Subjective:       Patient ID:  Shahana Mcknight is a 69 y.o. female who presents for   Chief Complaint   Patient presents with    Skin Check    Spot     Last OV 2-    patient here today for 6 month follow up TBSE   Also c/o spot on forehead x few months that is rough, dry, scaly, used moisturizer and coconut oil, no relief    Spot     Hx of BCC x's 2 on face within six months of each other treated with [facility] (Dr. Martinez) and plastics to closed next day Nov 2005- Venkatesh Montero   Hx of BCC right leg treated with surgery by derm in [place] about 4 years ago.       Review of Systems   Constitutional: Negative for fever, chills and fatigue.   Skin: Positive for daily sunscreen use, activity-related sunscreen use and wears hat.   Hematologic/Lymphatic: Bruises/bleeds easily.        Objective:    Physical Exam   Constitutional: She appears well-developed and well-nourished. No distress.   Eyes: Lids are normal.  No conjunctival no injection.   Cardiovascular: There is no local extremity swelling and no dependent edema.     Neurological: She is alert and oriented to person, place, and time. She is not disoriented.   Psychiatric: She has a normal mood and affect.   Skin:   Areas Examined (abnormalities noted in diagram):   Head / Face Inspection Performed  Neck Inspection Performed  Chest / Axilla Inspection Performed  Back Inspection Performed  RUE Inspected  LUE Inspection Performed  RLE Inspected  LLE Inspection Performed                   Diagram Legend     Erythematous scaling macule/papule c/w actinic keratosis       Vascular papule c/w angioma      Pigmented verrucoid papule/plaque c/w seborrheic keratosis      Yellow umbilicated papule c/w sebaceous hyperplasia      Irregularly shaped tan macule c/w lentigo     1-2 mm smooth white papules consistent with Milia      Movable subcutaneous cyst with punctum c/w epidermal inclusion cyst      Subcutaneous movable cyst c/w pilar cyst      Firm pink to brown papule  c/w dermatofibroma      Pedunculated fleshy papule(s) c/w skin tag(s)      Evenly pigmented macule c/w junctional nevus     Mildly variegated pigmented, slightly irregular-bordered macule c/w mildly atypical nevus      Flesh colored to evenly pigmented papule c/w intradermal nevus       Pink pearly papule/plaque c/w basal cell carcinoma      Erythematous hyperkeratotic cursted plaque c/w SCC      Surgical scar with no sign of skin cancer recurrence      Open and closed comedones      Inflammatory papules and pustules      Verrucoid papule consistent consistent with wart     Erythematous eczematous patches and plaques     Dystrophic onycholytic nail with subungual debris c/w onychomycosis     Umbilicated papule    Erythematous-base heme-crusted tan verrucoid plaque consistent with inflamed seborrheic keratosis     Erythematous Silvery Scaling Plaque c/w Psoriasis     See annotation      Assessment / Plan:        Personal history of other malignant neoplasm of skin  Area(s) of previous NMSC evaluated with no signs of recurrence.    Upper body skin examination performed today including at least 6 points as noted in physical examination. No lesions suspicious for malignancy noted.      Skin cancer screening  Area(s) of previous NMSC evaluated with no signs of recurrence.    Upper body skin examination performed today including at least 6 points as noted in physical examination. No lesions suspicious for malignancy noted.    Actinic keratoses  Cryosurgery Procedure Note    Verbal consent from the patient is obtained and the patient is aware of the precancerous quality and need for treatment of these lesions. Liquid nitrogen cryosurgery is applied to the 3 actinic keratoses, as detailed in the physical exam, to produce a freeze injury. The patient is aware that blisters may form and is instructed on wound care with gentle cleansing and use of vaseline ointment to keep moist until healed. The patient is supplied a handout  on cryosurgery and is instructed to call if lesions do not completely resolve. Discussed risk postinflammatory pigmentary changes.     Inflamed seborrheic keratosis  Cryosurgery procedure note:    Verbal consent from the patient is obtained. Liquid nitrogen cryosurgery is applied to 1 lesions to produce a freeze injury. The patient is aware that blisters may form and is instructed on wound care with gentle cleansing and use of vaseline ointment to keep moist until healed. The patient is supplied a handout on cryosurgery and is instructed to call if lesions do not completely resolve. Risk of dyspigmentation discussed.       Seborrheic keratoses, trunk  These are benign inherited growths without a malignant potential. Reassurance given to patient. No treatment is necessary.                Follow up in about 6 months (around 2/14/2020).

## 2019-08-29 RX ORDER — PITAVASTATIN CALCIUM 4.18 MG/1
0.5 TABLET, FILM COATED ORAL
COMMUNITY
End: 2021-03-17

## 2019-09-05 LAB
ALBUMIN SERPL-MCNC: 4.4 G/DL (ref 3.6–5.1)
ALBUMIN/CREAT UR: 12 MCG/MG CREAT
ALBUMIN/GLOB SERPL: 1.6 (CALC) (ref 1–2.5)
ALP SERPL-CCNC: 79 U/L (ref 33–130)
ALT SERPL-CCNC: 14 U/L (ref 6–29)
APPEARANCE UR: ABNORMAL
AST SERPL-CCNC: 13 U/L (ref 10–35)
BACTERIA #/AREA URNS HPF: ABNORMAL /HPF
BILIRUB SERPL-MCNC: 0.4 MG/DL (ref 0.2–1.2)
BILIRUB UR QL STRIP: NEGATIVE
BUN SERPL-MCNC: 17 MG/DL (ref 7–25)
BUN/CREAT SERPL: NORMAL (CALC) (ref 6–22)
CALCIUM SERPL-MCNC: 10 MG/DL (ref 8.6–10.4)
CHLORIDE SERPL-SCNC: 101 MMOL/L (ref 98–110)
CHOLEST SERPL-MCNC: 190 MG/DL
CHOLEST/HDLC SERPL: 4.4 (CALC)
CO2 SERPL-SCNC: 31 MMOL/L (ref 20–32)
COLOR UR: YELLOW
CREAT SERPL-MCNC: 0.78 MG/DL (ref 0.6–0.93)
CREAT UR-MCNC: 52 MG/DL (ref 20–275)
ERYTHROCYTE [DISTWIDTH] IN BLOOD BY AUTOMATED COUNT: 12.4 % (ref 11–15)
GFRSERPLBLD MDRD-ARVRAT: 76 ML/MIN/1.73M2
GLOBULIN SER CALC-MCNC: 2.8 G/DL (CALC) (ref 1.9–3.7)
GLUCOSE SERPL-MCNC: 93 MG/DL (ref 65–99)
GLUCOSE UR QL STRIP: NEGATIVE
HCT VFR BLD AUTO: 46.8 % (ref 35–45)
HDLC SERPL-MCNC: 43 MG/DL
HGB BLD-MCNC: 15.6 G/DL (ref 11.7–15.5)
HGB UR QL STRIP: NEGATIVE
HYALINE CASTS #/AREA URNS LPF: ABNORMAL /LPF
KETONES UR QL STRIP: NEGATIVE
LDLC SERPL CALC-MCNC: 122 MG/DL (CALC)
LEUKOCYTE ESTERASE UR QL STRIP: ABNORMAL
MCH RBC QN AUTO: 29.5 PG (ref 27–33)
MCHC RBC AUTO-ENTMCNC: 33.3 G/DL (ref 32–36)
MCV RBC AUTO: 88.5 FL (ref 80–100)
MICROALBUMIN UR-MCNC: 0.6 MG/DL
NITRITE UR QL STRIP: NEGATIVE
NONHDLC SERPL-MCNC: 147 MG/DL (CALC)
PH UR STRIP: 7.5 [PH] (ref 5–8)
PLATELET # BLD AUTO: 329 THOUSAND/UL (ref 140–400)
PMV BLD REES-ECKER: 11.1 FL (ref 7.5–12.5)
POTASSIUM SERPL-SCNC: 4.1 MMOL/L (ref 3.5–5.3)
PROT SERPL-MCNC: 7.2 G/DL (ref 6.1–8.1)
PROT UR QL STRIP: NEGATIVE
RBC # BLD AUTO: 5.29 MILLION/UL (ref 3.8–5.1)
RBC #/AREA URNS HPF: ABNORMAL /HPF
SODIUM SERPL-SCNC: 140 MMOL/L (ref 135–146)
SP GR UR STRIP: 1.01 (ref 1–1.03)
SQUAMOUS #/AREA URNS HPF: ABNORMAL /HPF
TRIGL SERPL-MCNC: 131 MG/DL
WBC # BLD AUTO: 6.9 THOUSAND/UL (ref 3.8–10.8)
WBC #/AREA URNS HPF: ABNORMAL /HPF

## 2019-09-11 ENCOUNTER — OFFICE VISIT (OUTPATIENT)
Dept: FAMILY MEDICINE | Facility: CLINIC | Age: 69
End: 2019-09-11
Payer: MEDICARE

## 2019-09-11 VITALS
DIASTOLIC BLOOD PRESSURE: 82 MMHG | WEIGHT: 147 LBS | BODY MASS INDEX: 27.05 KG/M2 | SYSTOLIC BLOOD PRESSURE: 118 MMHG | HEART RATE: 67 BPM | HEIGHT: 62 IN

## 2019-09-11 DIAGNOSIS — J30.1 SEASONAL ALLERGIC RHINITIS DUE TO POLLEN: ICD-10-CM

## 2019-09-11 DIAGNOSIS — I10 HTN (HYPERTENSION) WITH GOAL TO BE DETERMINED: Primary | ICD-10-CM

## 2019-09-11 DIAGNOSIS — K21.9 GASTROESOPHAGEAL REFLUX DISEASE, ESOPHAGITIS PRESENCE NOT SPECIFIED: Chronic | ICD-10-CM

## 2019-09-11 DIAGNOSIS — E78.01 FAMILIAL HYPERCHOLESTEROLEMIA: ICD-10-CM

## 2019-09-11 DIAGNOSIS — N95.1 SYMPTOMATIC MENOPAUSAL OR FEMALE CLIMACTERIC STATES: Chronic | ICD-10-CM

## 2019-09-11 DIAGNOSIS — M81.0 AGE-RELATED OSTEOPOROSIS WITHOUT CURRENT PATHOLOGICAL FRACTURE: ICD-10-CM

## 2019-09-11 DIAGNOSIS — E66.3 OVER WEIGHT: ICD-10-CM

## 2019-09-11 PROBLEM — K57.90 DIVERTICULAR DISEASE OF INTESTINE WITHOUT PERFORATION OR ABSCESS: Status: ACTIVE | Noted: 2019-09-11

## 2019-09-11 PROCEDURE — 99214 OFFICE O/P EST MOD 30 MIN: CPT | Mod: S$GLB,,, | Performed by: FAMILY MEDICINE

## 2019-09-11 PROCEDURE — 1101F PT FALLS ASSESS-DOCD LE1/YR: CPT | Mod: S$GLB,,, | Performed by: FAMILY MEDICINE

## 2019-09-11 PROCEDURE — 99214 PR OFFICE/OUTPT VISIT, EST, LEVL IV, 30-39 MIN: ICD-10-PCS | Mod: S$GLB,,, | Performed by: FAMILY MEDICINE

## 2019-09-11 PROCEDURE — 1101F PR PT FALLS ASSESS DOC 0-1 FALLS W/OUT INJ PAST YR: ICD-10-PCS | Mod: S$GLB,,, | Performed by: FAMILY MEDICINE

## 2019-09-11 RX ORDER — ESTRADIOL 0.5 MG/1
0.5 TABLET ORAL DAILY
Qty: 90 TABLET | Refills: 1 | Status: SHIPPED | OUTPATIENT
Start: 2019-09-11 | End: 2020-03-11 | Stop reason: SDUPTHER

## 2019-09-11 RX ORDER — RISEDRONATE SODIUM 35 MG/1
1 TABLET, DELAYED RELEASE ORAL
Qty: 12 TABLET | Refills: 1 | Status: SHIPPED | OUTPATIENT
Start: 2019-09-11 | End: 2020-04-02 | Stop reason: SDUPTHER

## 2019-09-11 NOTE — PROGRESS NOTES
SUBJECTIVE:    Patient ID: Shahana Mcknight is a 69 y.o. female.    Chief Complaint: Hypertension (check up )    Pt here to checkup on HTN, Osteoporosis, Menopause, GERD, and hyperlipidemia, allergic rhinitis    HTN is controlled.    Pt reports being compliant with atelvia weekly for osteoporosis.  Also taking calcium as well.     Cont to have hot flashes despite being on estradiol. Has tried decreasing the dose to 1/2 tablet at times, but has been unable to completely stop. Hot flashes also interfere with sleep.  Last mammogram was 2/2019.    GERD is controlled, only taking zantac about once a month. Watching her diet more.    Cont to take livalo without side effects for hyperlipidemia with LDL of 122.    Allergies are under reasonable control with astepro. Recently had antibiotics at home and took some, that she had at home, for 1 week.    No longer seeing Dr. Allen for chronic left knee pain/arthritis, not limiting activity at this time.  She will need injections with synthetic material if any worse.    Now seeing Dr. Reza for her glaucoma.      Orders Only on 09/04/2019   Component Date Value Ref Range Status    Cholesterol 09/04/2019 190  <200 mg/dL Final    HDL 09/04/2019 43* >50 mg/dL Final    Triglycerides 09/04/2019 131  <150 mg/dL Final    LDL Cholesterol 09/04/2019 122* mg/dL (calc) Final    Hdl/Cholesterol Ratio 09/04/2019 4.4  <5.0 (calc) Final    Non HDL Chol. (LDL+VLDL) 09/04/2019 147* <130 mg/dL (calc) Final    Creatinine, Random Ur 09/04/2019 52  20 - 275 mg/dL Final    Microalb, Ur 09/04/2019 0.6  See Note: mg/dL Final    Microalb Creat Ratio 09/04/2019 12  <30 mcg/mg creat Final    Glucose 09/04/2019 93  65 - 99 mg/dL Final    BUN, Bld 09/04/2019 17  7 - 25 mg/dL Final    Creatinine 09/04/2019 0.78  0.60 - 0.93 mg/dL Final    eGFR if non African American 09/04/2019 76  > OR = 60 mL/min/1.73m2 Final    eGFR if African American 09/04/2019 89  > OR = 60 mL/min/1.73m2 Final     BUN/Creatinine Ratio 09/04/2019 NOT APPLICABLE  6 - 22 (calc) Final    Sodium 09/04/2019 140  135 - 146 mmol/L Final    Potassium 09/04/2019 4.1  3.5 - 5.3 mmol/L Final    Chloride 09/04/2019 101  98 - 110 mmol/L Final    CO2 09/04/2019 31  20 - 32 mmol/L Final    Calcium 09/04/2019 10.0  8.6 - 10.4 mg/dL Final    Total Protein 09/04/2019 7.2  6.1 - 8.1 g/dL Final    Albumin 09/04/2019 4.4  3.6 - 5.1 g/dL Final    Globulin, Total 09/04/2019 2.8  1.9 - 3.7 g/dL (calc) Final    Albumin/Globulin Ratio 09/04/2019 1.6  1.0 - 2.5 (calc) Final    Total Bilirubin 09/04/2019 0.4  0.2 - 1.2 mg/dL Final    Alkaline Phosphatase 09/04/2019 79  33 - 130 U/L Final    AST 09/04/2019 13  10 - 35 U/L Final    ALT 09/04/2019 14  6 - 29 U/L Final    WBC 09/04/2019 6.9  3.8 - 10.8 Thousand/uL Final    RBC 09/04/2019 5.29* 3.80 - 5.10 Million/uL Final    Hemoglobin 09/04/2019 15.6* 11.7 - 15.5 g/dL Final    Hematocrit 09/04/2019 46.8* 35.0 - 45.0 % Final    Mean Corpuscular Volume 09/04/2019 88.5  80.0 - 100.0 fL Final    Mean Corpuscular Hemoglobin 09/04/2019 29.5  27.0 - 33.0 pg Final    Mean Corpuscular Hemoglobin Conc 09/04/2019 33.3  32.0 - 36.0 g/dL Final    RDW 09/04/2019 12.4  11.0 - 15.0 % Final    Platelets 09/04/2019 329  140 - 400 Thousand/uL Final    MPV 09/04/2019 11.1  7.5 - 12.5 fL Final    Color, UA 09/04/2019 YELLOW  YELLOW Final    Appearance, UA 09/04/2019 CLOUDY* CLEAR Final    Specific Gravity, UA 09/04/2019 1.008  1.001 - 1.035 Final    pH, UA 09/04/2019 7.5  5.0 - 8.0 Final    Glucose, UA 09/04/2019 NEGATIVE  NEGATIVE Final    Bilirubin, UA 09/04/2019 NEGATIVE  NEGATIVE Final    Ketones, UA 09/04/2019 NEGATIVE  NEGATIVE Final    Occult Blood UA 09/04/2019 NEGATIVE  NEGATIVE Final    Protein, UA 09/04/2019 NEGATIVE  NEGATIVE Final    Nitrite, UA 09/04/2019 NEGATIVE  NEGATIVE Final    Leukocytes, UA 09/04/2019 3+* NEGATIVE Final    WBC Casts, UA 09/04/2019 > OR = 60* < OR = 5 /HPF  Final    RBC Casts,  09/04/2019 NONE SEEN  < OR = 2 /HPF Final    Squam Epithel,  09/04/2019 10-20* < OR = 5 /HPF Final    Bacteria,  09/04/2019 NONE SEEN  NONE SEEN /HPF Final    Hyaline Casts,  09/04/2019 NONE SEEN  NONE SEEN /LPF Final       Past Medical History:   Diagnosis Date    Basal cell carcinoma     Hyperlipidemia     Osteopenia      Past Surgical History:   Procedure Laterality Date    basil cell carcinoma removal      BREAST CYST ASPIRATION      HYSTERECTOMY      SINUS SURGERY       Family History   Problem Relation Age of Onset    Cancer Mother        Marital Status:   Alcohol History:  reports that she drinks alcohol.  Tobacco History:  reports that she has quit smoking. She has never used smokeless tobacco.  Drug History:  reports that she does not use drugs.    Review of patient's allergies indicates:   Allergen Reactions    Actonel [risedronate]     Alendronic acid     Boniva [ibandronate]     Fosamax [alendronate]     Hydrocodone-acetaminophen     Hydrocodone-cpm-pseudoephed     Ibandronic acid     Lovastatin     Oxycodone     Risedronic acid     Rosuvastatin        Current Outpatient Medications:     azelastine (ASTELIN) 137 mcg (0.1 %) nasal spray, 1 spray by Nasal route 2 (two) times daily., Disp: , Rfl:     estradiol (ESTRACE) 0.5 MG tablet, Take 1 tablet (0.5 mg total) by mouth once daily., Disp: 90 tablet, Rfl: 1    hydroCHLOROthiazide (HYDRODIURIL) 12.5 MG Tab, , Disp: , Rfl: 3    latanoprost, PF, 0.005 % Drop, Apply to eye., Disp: , Rfl:     pitavastatin calcium (LIVALO) 4 mg Tab, Take 0.5 tablets by mouth., Disp: , Rfl:     ranitidine (ZANTAC) 150 MG capsule, Take 150 mg by mouth daily as needed., Disp: , Rfl:     risedronate 35 mg TbEC, Take 1 tablet (35 mg total) by mouth every 7 days., Disp: 12 tablet, Rfl: 1    Review of Systems   Constitutional: Negative for appetite change, fatigue, fever and unexpected weight change.   HENT: Negative for  "ear pain, postnasal drip, sinus pain and sore throat.    Respiratory: Positive for cough. Negative for shortness of breath and wheezing.    Cardiovascular: Negative for chest pain and leg swelling.   Gastrointestinal: Negative for abdominal pain, constipation, nausea and vomiting.   Genitourinary: Negative for difficulty urinating, dysuria and frequency.        Hot flashes   Musculoskeletal: Negative for back pain, myalgias and neck pain.   Skin: Negative for rash.   Neurological: Negative for weakness, numbness and headaches.   Hematological: Does not bruise/bleed easily.   Psychiatric/Behavioral: Negative for dysphoric mood, sleep disturbance and suicidal ideas. The patient is not nervous/anxious.           Objective:      Vitals:    09/11/19 0800   BP: 118/82   Pulse: 67   Weight: 66.7 kg (147 lb)   Height: 5' 2" (1.575 m)     Physical Exam   Constitutional: She is oriented to person, place, and time. She appears well-developed and well-nourished.   HENT:   Head: Normocephalic and atraumatic.   Right Ear: Tympanic membrane and ear canal normal.   Left Ear: Tympanic membrane and ear canal normal.   Nose: Right sinus exhibits no maxillary sinus tenderness. Left sinus exhibits no maxillary sinus tenderness.   Mouth/Throat: Uvula is midline, oropharynx is clear and moist and mucous membranes are normal.   Neck: Neck supple. No thyromegaly present.   Cardiovascular: Normal rate and regular rhythm. Exam reveals no friction rub.   No murmur heard.  Pulmonary/Chest: Effort normal and breath sounds normal. She has no wheezes. She has no rales.   Abdominal: Soft. Bowel sounds are normal. She exhibits no distension. There is no tenderness.   Lymphadenopathy:     She has no cervical adenopathy.   Neurological: She is alert and oriented to person, place, and time.   Skin: Skin is warm and dry. No rash noted.   Psychiatric: She has a normal mood and affect. Her speech is normal and behavior is normal. Judgment and thought " content normal.   Vitals reviewed.        Assessment:       1. HTN (hypertension) with goal to be determined    2. Gastroesophageal reflux disease, esophagitis presence not specified    3. Symptomatic menopausal or female climacteric states    4. Familial hypercholesterolemia    5. Over weight    6. Seasonal allergic rhinitis due to pollen    7. Age-related osteoporosis without current pathological fracture         Plan:       HTN (hypertension) with goal to be determined  Comments:  Controlled. Cont to monitor.    Gastroesophageal reflux disease, esophagitis presence not specified  Comments:  Controlled on zantac. Cont to monitor.    Symptomatic menopausal or female climacteric states  Comments:  asymptomatic on estradiol. Will cont to monitor. Pt to cont regular mammograms. Aware of risks/benefits of use.  Orders:  -     estradiol (ESTRACE) 0.5 MG tablet; Take 1 tablet (0.5 mg total) by mouth once daily.  Dispense: 90 tablet; Refill: 1    Familial hypercholesterolemia  Comments:  Not controlled. LDL not optimal at 122, on livalo.    Over weight    Seasonal allergic rhinitis due to pollen  Comments:  Controlled. Cont astepro. Cont to monitor.    Age-related osteoporosis without current pathological fracture  Comments:  Stable on current treatment without side effects. will cont to monitor, DEXA will be repeated every 2 years.  Orders:  -     risedronate 35 mg TbEC; Take 1 tablet (35 mg total) by mouth every 7 days.  Dispense: 12 tablet; Refill: 1      Follow up in about 6 months (around 3/11/2020) for htn checkup.

## 2020-01-13 ENCOUNTER — TELEPHONE (OUTPATIENT)
Dept: FAMILY MEDICINE | Facility: CLINIC | Age: 70
End: 2020-01-13

## 2020-01-13 NOTE — TELEPHONE ENCOUNTER
Spoke with pt - Pt states that she can't take boniva because her dentist informed her not to take it because she began to  grind her teeth while on it and it was causing her a lot of jaw pain. Pt states that she can't take actenol before breakfast on an empty stomach and that's how they stated that she should take it. Pt states that if she can take it after breakfast then that would be fine.     Can she take after breakfast?

## 2020-01-13 NOTE — TELEPHONE ENCOUNTER
Pt will need a PA, A CMN from [facility]. Also call pt and find out what her problem is with the once monthly boniva (ibandraonate) before you get with [facility]

## 2020-01-13 NOTE — TELEPHONE ENCOUNTER
----- Message from Nati Rowell sent at 1/13/2020 10:09 AM CST -----  Pt received a notice from her [employer] about risedron sodium. They are no longer going to cover it. They gave her an alternative alendronate they will cover. She says she can not take that because she throws up every time she takes it. She is not sure what to do   [phone]

## 2020-01-15 NOTE — TELEPHONE ENCOUNTER
Not supposed to take actonel after breakfast. If atelvia is covered, can take that one after breakfast.      She can consider reclast which is a once yearly form of meds like actonel (etc.), or prolia which is a twice year injection, that is for osteoporosis that works on stopping bone breakdown.  We can discuss further at her upcoming visit if she would like

## 2020-01-15 NOTE — TELEPHONE ENCOUNTER
Yes , we can send order for atelvia 35mg weekly and we will do PA; and discuss other treatments at visit

## 2020-01-15 NOTE — TELEPHONE ENCOUNTER
Spoke with pt - pt informed hat per Dr. Yoder pt will be sent a rx for atelvia 35 mg weekly until her next visit where other treatment options will be discussed at that time. Pt verbalized an understanding and states that she doesn't need a new rx because she has refills on the one she already has. Pt informed that the pharmacy will more than likely states that she needs a pa for the medication and that she will need them to fax over a pa request. Pt verbalized an understanding of our conversation.

## 2020-01-15 NOTE — TELEPHONE ENCOUNTER
Spoke with pt - Pt informed that per Dr. Yoder actonel is not to be taken after breakfast and that if she would like to see if atelvia is covered because that medication can be taken after breakfast. Pt states that it won't be covered because her insurance won't cover the generic actonel. Pt informed of the reclast and prolia treatment for osteoporosis which help by stopping the bone from breaking down and the option to discuss theses treatment options at her upcoming visit. Pt verbalized that she would like to discuss these options with Dr. Yoder but wants to know if Dr. Yoder would do a pa for the medication until her appt in march.

## 2020-02-10 ENCOUNTER — TELEPHONE (OUTPATIENT)
Dept: FAMILY MEDICINE | Facility: CLINIC | Age: 70
End: 2020-02-10

## 2020-02-10 DIAGNOSIS — Z12.31 SCREENING MAMMOGRAM, ENCOUNTER FOR: Primary | ICD-10-CM

## 2020-02-10 NOTE — TELEPHONE ENCOUNTER
----- Message from Gregory Umanzor sent at 2/10/2020 10:23 AM CST -----  Contact: Shahana Mcknight   Needs Mammo gram orders faxed over to [facility] so she can make her appt. Please.   Pt# [phone]

## 2020-02-19 ENCOUNTER — OFFICE VISIT (OUTPATIENT)
Dept: DERMATOLOGY | Facility: CLINIC | Age: 70
End: 2020-02-19
Payer: MEDICARE

## 2020-02-19 DIAGNOSIS — L81.4 SOLAR LENTIGO: ICD-10-CM

## 2020-02-19 DIAGNOSIS — L82.1 SEBORRHEIC KERATOSES: ICD-10-CM

## 2020-02-19 DIAGNOSIS — Z85.828 HISTORY OF NONMELANOMA SKIN CANCER: ICD-10-CM

## 2020-02-19 DIAGNOSIS — D18.01 CHERRY ANGIOMA: ICD-10-CM

## 2020-02-19 DIAGNOSIS — D22.9 MULTIPLE BENIGN NEVI: ICD-10-CM

## 2020-02-19 DIAGNOSIS — L57.0 ACTINIC KERATOSES: Primary | ICD-10-CM

## 2020-02-19 PROCEDURE — 99999 PR PBB SHADOW E&M-EST. PATIENT-LVL II: CPT | Mod: PBBFAC,,, | Performed by: DERMATOLOGY

## 2020-02-19 PROCEDURE — 1159F MED LIST DOCD IN RCRD: CPT | Mod: S$GLB,,, | Performed by: DERMATOLOGY

## 2020-02-19 PROCEDURE — 17003 DESTRUCTION, PREMALIGNANT LESIONS; SECOND THROUGH 14 LESIONS: ICD-10-PCS | Mod: S$GLB,,, | Performed by: DERMATOLOGY

## 2020-02-19 PROCEDURE — 17000 PR DESTRUCTION(LASER SURGERY,CRYOSURGERY,CHEMOSURGERY),PREMALIGNANT LESIONS,FIRST LESION: ICD-10-PCS | Mod: S$GLB,,, | Performed by: DERMATOLOGY

## 2020-02-19 PROCEDURE — 17000 DESTRUCT PREMALG LESION: CPT | Mod: S$GLB,,, | Performed by: DERMATOLOGY

## 2020-02-19 PROCEDURE — 99214 PR OFFICE/OUTPT VISIT, EST, LEVL IV, 30-39 MIN: ICD-10-PCS | Mod: 25,S$GLB,, | Performed by: DERMATOLOGY

## 2020-02-19 PROCEDURE — 1101F PR PT FALLS ASSESS DOC 0-1 FALLS W/OUT INJ PAST YR: ICD-10-PCS | Mod: CPTII,S$GLB,, | Performed by: DERMATOLOGY

## 2020-02-19 PROCEDURE — 1126F PR PAIN SEVERITY QUANTIFIED, NO PAIN PRESENT: ICD-10-PCS | Mod: S$GLB,,, | Performed by: DERMATOLOGY

## 2020-02-19 PROCEDURE — 99214 OFFICE O/P EST MOD 30 MIN: CPT | Mod: 25,S$GLB,, | Performed by: DERMATOLOGY

## 2020-02-19 PROCEDURE — 99999 PR PBB SHADOW E&M-EST. PATIENT-LVL II: ICD-10-PCS | Mod: PBBFAC,,, | Performed by: DERMATOLOGY

## 2020-02-19 PROCEDURE — 1101F PT FALLS ASSESS-DOCD LE1/YR: CPT | Mod: CPTII,S$GLB,, | Performed by: DERMATOLOGY

## 2020-02-19 PROCEDURE — 1159F PR MEDICATION LIST DOCUMENTED IN MEDICAL RECORD: ICD-10-PCS | Mod: S$GLB,,, | Performed by: DERMATOLOGY

## 2020-02-19 PROCEDURE — 1126F AMNT PAIN NOTED NONE PRSNT: CPT | Mod: S$GLB,,, | Performed by: DERMATOLOGY

## 2020-02-19 PROCEDURE — 17003 DESTRUCT PREMALG LES 2-14: CPT | Mod: S$GLB,,, | Performed by: DERMATOLOGY

## 2020-02-19 NOTE — PATIENT INSTRUCTIONS

## 2020-02-19 NOTE — PROGRESS NOTES
Subjective:       Patient ID:  Shahana Mcknight is a 69 y.o. female who presents for   Chief Complaint   Patient presents with    Skin Check     TBSE    Spot     R cheek and forehead, few months, rough, no tx     Last visit with Dr. Fernando 8-14-19 for TBSE and tx of AKs with cryo.    Here today for TBSE and spots to R cheek and central forehead for few months.  Rough, no tx.    Hx of BCC x's 2 on face within six months of each other treated with MOHS (Dr. Martinez) and plastics to closed next day Nov 2005- Venkatesh Winston   Hx of BCC right leg treated with surgery by derm in [place] about 4 years ago.      Review of Systems   Constitutional: Negative for fever, chills and fatigue.   Skin: Positive for daily sunscreen use, activity-related sunscreen use and wears hat.   Hematologic/Lymphatic: Bruises/bleeds easily.        Objective:    Physical Exam   Constitutional: She appears well-developed and well-nourished. No distress.   Eyes: Lids are normal.  No conjunctival no injection.   Cardiovascular: There is no local extremity swelling and no dependent edema.     Neurological: She is alert and oriented to person, place, and time. She is not disoriented.   Psychiatric: She has a normal mood and affect.   Skin:   Areas Examined (abnormalities noted in diagram):   Scalp / Hair Palpated and Inspected  Head / Face Inspection Performed  Neck Inspection Performed  Chest / Axilla Inspection Performed  Abdomen Inspection Performed  Genitals / Buttocks / Groin Inspection Performed  Back Inspection Performed  RUE Inspected  LUE Inspection Performed  RLE Inspected  LLE Inspection Performed  Nails and Digits Inspection Performed                           Diagram Legend     Erythematous scaling macule/papule c/w actinic keratosis       Vascular papule c/w angioma      Pigmented verrucoid papule/plaque c/w seborrheic keratosis      Yellow umbilicated papule c/w sebaceous hyperplasia      Irregularly shaped tan macule c/w  lentigo     1-2 mm smooth white papules consistent with Milia      Movable subcutaneous cyst with punctum c/w epidermal inclusion cyst      Subcutaneous movable cyst c/w pilar cyst      Firm pink to brown papule c/w dermatofibroma      Pedunculated fleshy papule(s) c/w skin tag(s)      Evenly pigmented macule c/w junctional nevus     Mildly variegated pigmented, slightly irregular-bordered macule c/w mildly atypical nevus      Flesh colored to evenly pigmented papule c/w intradermal nevus       Pink pearly papule/plaque c/w basal cell carcinoma      Erythematous hyperkeratotic cursted plaque c/w SCC      Surgical scar with no sign of skin cancer recurrence      Open and closed comedones      Inflammatory papules and pustules      Verrucoid papule consistent consistent with wart     Erythematous eczematous patches and plaques     Dystrophic onycholytic nail with subungual debris c/w onychomycosis     Umbilicated papule    Erythematous-base heme-crusted tan verrucoid plaque consistent with inflamed seborrheic keratosis     Erythematous Silvery Scaling Plaque c/w Psoriasis     See annotation      Assessment / Plan:        Actinic keratoses  Cryosurgery Procedure Note    Verbal consent from the patient is obtained and the patient is aware of the precancerous quality and need for treatment of these lesions. Liquid nitrogen cryosurgery is applied to the 3 actinic keratoses, as detailed in the physical exam, to produce a freeze injury. The patient is aware that blisters may form and is instructed on wound care with gentle cleansing and use of vaseline ointment to keep moist until healed. The patient is supplied a handout on cryosurgery and is instructed to call if lesions do not completely resolve.    History of nonmelanoma skin cancer  Area of previous melanoma examined. Site well healed with no signs of recurrence.    Total body skin examination performed today including at least 12 points as noted in physical  examination. No lesions suspicious for malignancy noted.    Seborrheic keratoses  These are benign inherited growths without a malignant potential. Reassurance given to patient. No treatment is necessary.     Solar lentigo  This is a benign hyperpigmented sun induced lesion. Daily sun protection will reduce the number of new lesions. Treatment of these benign lesions are considered cosmetic.    Cherry angioma  This is a benign vascular lesion. Reassurance given. No treatment required.     Multiple benign nevi  Discussed ABCDE's of nevi.  Monitor for new mole or moles that are becoming bigger, darker, irritated, or developing irregular borders. Brochure provided.    Patient instructed in importance in daily sun protection of at least spf 30. Mineral sunscreen ingredients preferred (Zinc +/- Titanium).   Recommend Elta MD for daily use on face and neck.  Patient encouraged to wear hat for all outdoor exposure.   Also discussed sun avoidance and use of protective clothing.           Follow up in about 6 months (around 8/19/2020).

## 2020-02-21 ENCOUNTER — TELEPHONE (OUTPATIENT)
Dept: FAMILY MEDICINE | Facility: CLINIC | Age: 70
End: 2020-02-21

## 2020-02-21 ENCOUNTER — HOSPITAL ENCOUNTER (OUTPATIENT)
Dept: RADIOLOGY | Facility: HOSPITAL | Age: 70
Discharge: HOME | End: 2020-02-21
Attending: FAMILY MEDICINE
Payer: MEDICARE

## 2020-02-21 DIAGNOSIS — Z12.31 SCREENING MAMMOGRAM, ENCOUNTER FOR: ICD-10-CM

## 2020-02-21 PROCEDURE — 77067 MAMMO DIGITAL SCREENING BILAT WITH TOMOSYNTHESIS_CAD: ICD-10-PCS | Mod: 26,,, | Performed by: RADIOLOGY

## 2020-02-21 PROCEDURE — 77063 MAMMO DIGITAL SCREENING BILAT WITH TOMOSYNTHESIS_CAD: ICD-10-PCS | Mod: 26,,, | Performed by: RADIOLOGY

## 2020-02-21 PROCEDURE — 77067 SCR MAMMO BI INCL CAD: CPT | Mod: TC

## 2020-02-21 PROCEDURE — 77063 BREAST TOMOSYNTHESIS BI: CPT | Mod: 26,,, | Performed by: RADIOLOGY

## 2020-02-21 PROCEDURE — 77067 SCR MAMMO BI INCL CAD: CPT | Mod: 26,,, | Performed by: RADIOLOGY

## 2020-02-21 NOTE — TELEPHONE ENCOUNTER
Spoke with pt - Pt informed that her rx for risendronate was approved from 1/24/2020 to 12/31/2020. Pt verbalized that she was aware because she received a letter as well.

## 2020-02-28 ENCOUNTER — TELEPHONE (OUTPATIENT)
Dept: FAMILY MEDICINE | Facility: CLINIC | Age: 70
End: 2020-02-28

## 2020-02-28 NOTE — TELEPHONE ENCOUNTER
----- Message from Jeanmarie Yoder MD sent at 2/27/2020  5:14 PM CST -----  Please let the patient know that her mammogram is normal. To repeat in 1-2 years.

## 2020-03-11 ENCOUNTER — OFFICE VISIT (OUTPATIENT)
Dept: FAMILY MEDICINE | Facility: CLINIC | Age: 70
End: 2020-03-11
Payer: MEDICARE

## 2020-03-11 VITALS — WEIGHT: 151.63 LBS | BODY MASS INDEX: 27.9 KG/M2 | HEIGHT: 62 IN

## 2020-03-11 DIAGNOSIS — Z13.6 SCREENING FOR ISCHEMIC HEART DISEASE (IHD): ICD-10-CM

## 2020-03-11 DIAGNOSIS — K21.9 GASTROESOPHAGEAL REFLUX DISEASE WITHOUT ESOPHAGITIS: ICD-10-CM

## 2020-03-11 DIAGNOSIS — M81.0 AGE-RELATED OSTEOPOROSIS WITHOUT CURRENT PATHOLOGICAL FRACTURE: ICD-10-CM

## 2020-03-11 DIAGNOSIS — I10 HTN (HYPERTENSION) WITH GOAL TO BE DETERMINED: Primary | ICD-10-CM

## 2020-03-11 DIAGNOSIS — Z79.899 LONG-TERM USE OF HIGH-RISK MEDICATION: ICD-10-CM

## 2020-03-11 DIAGNOSIS — Z13.89 SCREENING FOR BLOOD OR PROTEIN IN URINE: ICD-10-CM

## 2020-03-11 DIAGNOSIS — N95.1 SYMPTOMATIC MENOPAUSAL OR FEMALE CLIMACTERIC STATES: Chronic | ICD-10-CM

## 2020-03-11 DIAGNOSIS — J30.1 SEASONAL ALLERGIC RHINITIS DUE TO POLLEN: ICD-10-CM

## 2020-03-11 PROCEDURE — 99214 PR OFFICE/OUTPT VISIT, EST, LEVL IV, 30-39 MIN: ICD-10-PCS | Mod: S$GLB,,, | Performed by: FAMILY MEDICINE

## 2020-03-11 PROCEDURE — 3078F DIAST BP <80 MM HG: CPT | Mod: S$GLB,,, | Performed by: FAMILY MEDICINE

## 2020-03-11 PROCEDURE — 99214 OFFICE O/P EST MOD 30 MIN: CPT | Mod: S$GLB,,, | Performed by: FAMILY MEDICINE

## 2020-03-11 PROCEDURE — 3078F PR MOST RECENT DIASTOLIC BLOOD PRESSURE < 80 MM HG: ICD-10-PCS | Mod: S$GLB,,, | Performed by: FAMILY MEDICINE

## 2020-03-11 PROCEDURE — 1159F PR MEDICATION LIST DOCUMENTED IN MEDICAL RECORD: ICD-10-PCS | Mod: S$GLB,,, | Performed by: FAMILY MEDICINE

## 2020-03-11 PROCEDURE — 1159F MED LIST DOCD IN RCRD: CPT | Mod: S$GLB,,, | Performed by: FAMILY MEDICINE

## 2020-03-11 PROCEDURE — 1101F PR PT FALLS ASSESS DOC 0-1 FALLS W/OUT INJ PAST YR: ICD-10-PCS | Mod: S$GLB,,, | Performed by: FAMILY MEDICINE

## 2020-03-11 PROCEDURE — 1126F AMNT PAIN NOTED NONE PRSNT: CPT | Mod: S$GLB,,, | Performed by: FAMILY MEDICINE

## 2020-03-11 PROCEDURE — 1126F PR PAIN SEVERITY QUANTIFIED, NO PAIN PRESENT: ICD-10-PCS | Mod: S$GLB,,, | Performed by: FAMILY MEDICINE

## 2020-03-11 PROCEDURE — 3074F SYST BP LT 130 MM HG: CPT | Mod: S$GLB,,, | Performed by: FAMILY MEDICINE

## 2020-03-11 PROCEDURE — 3074F PR MOST RECENT SYSTOLIC BLOOD PRESSURE < 130 MM HG: ICD-10-PCS | Mod: S$GLB,,, | Performed by: FAMILY MEDICINE

## 2020-03-11 PROCEDURE — 1101F PT FALLS ASSESS-DOCD LE1/YR: CPT | Mod: S$GLB,,, | Performed by: FAMILY MEDICINE

## 2020-03-11 RX ORDER — ESTRADIOL 0.5 MG/1
0.5 TABLET ORAL DAILY
Qty: 90 TABLET | Refills: 1 | Status: SHIPPED | OUTPATIENT
Start: 2020-03-11 | End: 2020-09-17 | Stop reason: SDUPTHER

## 2020-03-11 RX ORDER — AZELASTINE 1 MG/ML
1 SPRAY, METERED NASAL 2 TIMES DAILY
Qty: 30 ML | Refills: 5 | Status: SHIPPED | OUTPATIENT
Start: 2020-03-11 | End: 2021-03-18 | Stop reason: SDUPTHER

## 2020-03-11 NOTE — PROGRESS NOTES
SUBJECTIVE:    Patient ID: Shahana Mcknight is a 69 y.o. female.    Chief Complaint: Hypertension (check up) and Bottles brought    Pt here to checkup on HTN, Osteoporosis, Menopause, GERD, and hyperlipidemia, allergic rhinitis    BP is doing ok today. Denies CP/SOB.    Pt reports being compliant with atelvia weekly for osteoporosis.  Has intolerance to other forms that have to be taken on an empty stomach, causing her nausea and vomitting.  Also taking calcium as well.  Due for DEXA.    Cont to have hot flashes at night despite being on estradiol. Has succeeded in decreasing dose to 1/2 tablet now. Continues to try to wean eventually.     GERD is controlled. No longer taking taking zantac.  Just using rolaids or tums about once a month.    Allergies are under reasonable control with astepro.  Having some post nasal drip.    Left knee pain/arthritis (Dr. Allen) is not limiting activity at this time.  She will need injections with synthetic material if any worse.      -----------------------------------------------------  Continues to see Dr. Reza for her glaucoma.  Last mammogram was 2/2019.  Cscope scheduled for May 1 with Dr. Almeida          Past Medical History:   Diagnosis Date    Basal cell carcinoma 2005    R upper cut lip    Hyperlipidemia     Osteopenia      Past Surgical History:   Procedure Laterality Date    basil cell carcinoma removal      BREAST CYST ASPIRATION      HYSTERECTOMY      SINUS SURGERY       Family History   Problem Relation Age of Onset    Cancer Mother     Melanoma Neg Hx     Psoriasis Neg Hx     Lupus Neg Hx     Eczema Neg Hx        Marital Status:   Alcohol History:  reports that she drinks alcohol.  Tobacco History:  reports that she has quit smoking. She has never used smokeless tobacco.  Drug History:  reports that she does not use drugs.    Review of patient's allergies indicates:   Allergen Reactions    Alendronic acid     Boniva [ibandronate]      "Fosamax [alendronate] Nausea And Vomiting    Hydrocodone-acetaminophen     Hydrocodone-cpm-pseudoephed     Ibandronic acid     Lovastatin     Oxycodone     Rosuvastatin        Current Outpatient Medications:     azelastine (ASTELIN) 137 mcg (0.1 %) nasal spray, 1 spray (137 mcg total) by Nasal route 2 (two) times daily., Disp: 30 mL, Rfl: 5    estradioL (ESTRACE) 0.5 MG tablet, Take 1 tablet (0.5 mg total) by mouth once daily., Disp: 90 tablet, Rfl: 1    hydroCHLOROthiazide (HYDRODIURIL) 12.5 MG Tab, , Disp: , Rfl: 3    latanoprost, PF, 0.005 % Drop, Apply to eye., Disp: , Rfl:     pitavastatin calcium (LIVALO) 4 mg Tab, Take 0.5 tablets by mouth., Disp: , Rfl:     risedronate 35 mg TbEC, Take 1 tablet (35 mg total) by mouth every 7 days., Disp: 12 tablet, Rfl: 1    Review of Systems   Constitutional: Negative for appetite change, fatigue, fever and unexpected weight change.   HENT: Negative for ear pain, postnasal drip, sinus pain and sore throat.    Respiratory: Negative for cough, shortness of breath and wheezing.    Cardiovascular: Negative for chest pain and leg swelling.   Gastrointestinal: Negative for abdominal pain, constipation, nausea and vomiting.        -heartburn   Genitourinary: Negative for difficulty urinating, dysuria and frequency.        +Hot flashes   Musculoskeletal: Negative for back pain, myalgias and neck pain.   Skin: Negative for rash.   Neurological: Negative for weakness, numbness and headaches.   Hematological: Does not bruise/bleed easily.   Psychiatric/Behavioral: Negative for dysphoric mood, sleep disturbance and suicidal ideas. The patient is not nervous/anxious.           Objective:      Vitals:    03/11/20 0801   BP: (P) 112/74   Pulse: (P) 74   Weight: 68.8 kg (151 lb 9.6 oz)   Height: 5' 2" (1.575 m)     Body mass index is 27.73 kg/m².     Physical Exam   Constitutional: She is oriented to person, place, and time. She appears well-developed and well-nourished. "   overweight   HENT:   Head: Normocephalic and atraumatic.   Right Ear: Tympanic membrane and ear canal normal.   Left Ear: Tympanic membrane and ear canal normal.   Nose: Right sinus exhibits no maxillary sinus tenderness. Left sinus exhibits no maxillary sinus tenderness.   Mouth/Throat: Uvula is midline, oropharynx is clear and moist and mucous membranes are normal.   Neck: Neck supple. No thyromegaly present.   Cardiovascular: Normal rate and regular rhythm. Exam reveals no friction rub.   No murmur heard.  Pulmonary/Chest: Effort normal and breath sounds normal. She has no wheezes. She has no rales.   Abdominal: Soft. Bowel sounds are normal. She exhibits no distension. There is no tenderness.   Lymphadenopathy:     She has no cervical adenopathy.   Neurological: She is alert and oriented to person, place, and time.   Skin: Skin is warm and dry. No rash noted.   Psychiatric: She has a normal mood and affect. Her speech is normal and behavior is normal. Judgment and thought content normal.   Vitals reviewed.        Assessment:       1. HTN (hypertension) with goal to be determined    2. Age-related osteoporosis without current pathological fracture    3. Symptomatic menopausal or female climacteric states    4. Seasonal allergic rhinitis due to pollen    5. Gastroesophageal reflux disease without esophagitis    6. Long-term use of high-risk medication    7. Screening for blood or protein in urine    8. Screening for ischemic heart disease (IHD)         Plan:       HTN (hypertension) with goal to be determined  Comments:  Blood pressure controlled. Will continue to monitor.  Orders:  -     Lipid panel; Future; Expected date: 08/11/2020  -     Comprehensive metabolic panel; Future; Expected date: 08/11/2020  -     Urinalysis; Future; Expected date: 08/11/2020  -     CBC auto differential; Future; Expected date: 08/11/2020  -     Microalbumin/creatinine urine ratio; Future; Expected date: 03/11/2020    Age-related  osteoporosis without current pathological fracture  Comments:  Active. Only tolerates Atelvia. To continue calcium plus D. DEXA order sent SMI  Orders:  -     DXA Bone Density Spine And Hip    Symptomatic menopausal or female climacteric states  Comments:  Asymptomatic on estradiol. Will cont to monitor. Pt to cont regular mammograms. Aware of risks/benefits of use.  Orders:  -     estradioL (ESTRACE) 0.5 MG tablet; Take 1 tablet (0.5 mg total) by mouth once daily.  Dispense: 90 tablet; Refill: 1    Seasonal allergic rhinitis due to pollen  Comments:  Controlled. To continue astepro. Will continue to monitor symptoms  Orders:  -     azelastine (ASTELIN) 137 mcg (0.1 %) nasal spray; 1 spray (137 mcg total) by Nasal route 2 (two) times daily.  Dispense: 30 mL; Refill: 5    Gastroesophageal reflux disease without esophagitis  Comments:  Controlled. Will continue to monitor    Long-term use of high-risk medication  -     Comprehensive metabolic panel; Future; Expected date: 08/11/2020  -     CBC auto differential; Future; Expected date: 08/11/2020    Screening for blood or protein in urine  -     Urinalysis; Future; Expected date: 08/11/2020  -     Microalbumin/creatinine urine ratio; Future; Expected date: 03/11/2020    Screening for ischemic heart disease (IHD)  -     Lipid panel; Future; Expected date: 08/11/2020    Labs have been ordered for monitoring of chronic conditions, just before next visit.    Follow up in about 6 months (around 9/11/2020) for HTN, Menopause, GERD,Allergies.

## 2020-03-13 ENCOUNTER — HOSPITAL ENCOUNTER (OUTPATIENT)
Dept: RADIOLOGY | Facility: HOSPITAL | Age: 70
Discharge: HOME | End: 2020-03-13
Attending: FAMILY MEDICINE
Payer: MEDICARE

## 2020-03-13 PROCEDURE — 77080 DXA BONE DENSITY AXIAL: CPT | Mod: TC,PO

## 2020-04-02 DIAGNOSIS — M81.0 AGE-RELATED OSTEOPOROSIS WITHOUT CURRENT PATHOLOGICAL FRACTURE: ICD-10-CM

## 2020-04-02 RX ORDER — RISEDRONATE SODIUM 35 MG/1
1 TABLET, DELAYED RELEASE ORAL
Qty: 12 TABLET | Refills: 1 | Status: SHIPPED | OUTPATIENT
Start: 2020-04-02 | End: 2020-09-17 | Stop reason: SDUPTHER

## 2020-04-02 NOTE — TELEPHONE ENCOUNTER
The patient's prescription has been approved and sent to   [id number] - [place], [place] - [address]  [address]  [place] [id number]  Phone: [phone] Fax: [phone]

## 2020-05-28 ENCOUNTER — LAB VISIT (OUTPATIENT)
Dept: PRIMARY CARE CLINIC | Facility: CLINIC | Age: 70
End: 2020-05-28
Payer: MEDICARE

## 2020-05-28 DIAGNOSIS — R05.9 COUGH: Primary | ICD-10-CM

## 2020-05-28 PROCEDURE — U0003 INFECTIOUS AGENT DETECTION BY NUCLEIC ACID (DNA OR RNA); SEVERE ACUTE RESPIRATORY SYNDROME CORONAVIRUS 2 (SARS-COV-2) (CORONAVIRUS DISEASE [COVID-19]), AMPLIFIED PROBE TECHNIQUE, MAKING USE OF HIGH THROUGHPUT TECHNOLOGIES AS DESCRIBED BY CMS-2020-01-R: HCPCS

## 2020-05-30 LAB — SARS-COV-2 RNA RESP QL NAA+PROBE: NOT DETECTED

## 2020-08-20 ENCOUNTER — OFFICE VISIT (OUTPATIENT)
Dept: DERMATOLOGY | Facility: CLINIC | Age: 70
End: 2020-08-20
Payer: MEDICARE

## 2020-08-20 VITALS — WEIGHT: 151 LBS | BODY MASS INDEX: 27.79 KG/M2 | HEIGHT: 62 IN

## 2020-08-20 DIAGNOSIS — L57.0 ACTINIC KERATOSES: Primary | ICD-10-CM

## 2020-08-20 DIAGNOSIS — L82.1 SEBORRHEIC KERATOSES: ICD-10-CM

## 2020-08-20 DIAGNOSIS — Z85.828 HISTORY OF NONMELANOMA SKIN CANCER: ICD-10-CM

## 2020-08-20 DIAGNOSIS — D22.9 MULTIPLE BENIGN NEVI: ICD-10-CM

## 2020-08-20 DIAGNOSIS — L82.0 INFLAMED SEBORRHEIC KERATOSIS: ICD-10-CM

## 2020-08-20 DIAGNOSIS — L81.4 SOLAR LENTIGO: ICD-10-CM

## 2020-08-20 PROCEDURE — 99214 PR OFFICE/OUTPT VISIT, EST, LEVL IV, 30-39 MIN: ICD-10-PCS | Mod: 25,S$GLB,, | Performed by: DERMATOLOGY

## 2020-08-20 PROCEDURE — 17000 DESTRUCT PREMALG LESION: CPT | Mod: 59,S$GLB,, | Performed by: DERMATOLOGY

## 2020-08-20 PROCEDURE — 17110 PR DESTRUCTION BENIGN LESIONS UP TO 14: ICD-10-PCS | Mod: S$GLB,,, | Performed by: DERMATOLOGY

## 2020-08-20 PROCEDURE — 17000 PR DESTRUCTION(LASER SURGERY,CRYOSURGERY,CHEMOSURGERY),PREMALIGNANT LESIONS,FIRST LESION: ICD-10-PCS | Mod: 59,S$GLB,, | Performed by: DERMATOLOGY

## 2020-08-20 PROCEDURE — 1101F PT FALLS ASSESS-DOCD LE1/YR: CPT | Mod: CPTII,S$GLB,, | Performed by: DERMATOLOGY

## 2020-08-20 PROCEDURE — 3008F PR BODY MASS INDEX (BMI) DOCUMENTED: ICD-10-PCS | Mod: CPTII,S$GLB,, | Performed by: DERMATOLOGY

## 2020-08-20 PROCEDURE — 1126F AMNT PAIN NOTED NONE PRSNT: CPT | Mod: S$GLB,,, | Performed by: DERMATOLOGY

## 2020-08-20 PROCEDURE — 17110 DESTRUCTION B9 LES UP TO 14: CPT | Mod: S$GLB,,, | Performed by: DERMATOLOGY

## 2020-08-20 PROCEDURE — 3008F BODY MASS INDEX DOCD: CPT | Mod: CPTII,S$GLB,, | Performed by: DERMATOLOGY

## 2020-08-20 PROCEDURE — 99999 PR PBB SHADOW E&M-EST. PATIENT-LVL III: CPT | Mod: PBBFAC,,, | Performed by: DERMATOLOGY

## 2020-08-20 PROCEDURE — 1159F PR MEDICATION LIST DOCUMENTED IN MEDICAL RECORD: ICD-10-PCS | Mod: S$GLB,,, | Performed by: DERMATOLOGY

## 2020-08-20 PROCEDURE — 99999 PR PBB SHADOW E&M-EST. PATIENT-LVL III: ICD-10-PCS | Mod: PBBFAC,,, | Performed by: DERMATOLOGY

## 2020-08-20 PROCEDURE — 1126F PR PAIN SEVERITY QUANTIFIED, NO PAIN PRESENT: ICD-10-PCS | Mod: S$GLB,,, | Performed by: DERMATOLOGY

## 2020-08-20 PROCEDURE — 17003 DESTRUCT PREMALG LES 2-14: CPT | Mod: 59,S$GLB,, | Performed by: DERMATOLOGY

## 2020-08-20 PROCEDURE — 1159F MED LIST DOCD IN RCRD: CPT | Mod: S$GLB,,, | Performed by: DERMATOLOGY

## 2020-08-20 PROCEDURE — 17003 DESTRUCTION, PREMALIGNANT LESIONS; SECOND THROUGH 14 LESIONS: ICD-10-PCS | Mod: 59,S$GLB,, | Performed by: DERMATOLOGY

## 2020-08-20 PROCEDURE — 99214 OFFICE O/P EST MOD 30 MIN: CPT | Mod: 25,S$GLB,, | Performed by: DERMATOLOGY

## 2020-08-20 PROCEDURE — 1101F PR PT FALLS ASSESS DOC 0-1 FALLS W/OUT INJ PAST YR: ICD-10-PCS | Mod: CPTII,S$GLB,, | Performed by: DERMATOLOGY

## 2020-08-20 NOTE — PATIENT INSTRUCTIONS

## 2020-08-20 NOTE — PROGRESS NOTES
Subjective:       Patient ID:  Shahana Mcknight is a 70 y.o. female who presents for   Chief Complaint   Patient presents with    Spot    Skin Check     LOV 2/2020 for skin check and AKs treated w/ cryo    Pt here today for c/o spot, on right shoulder, x few yrs, irritates by bra, no tx  Requests TBSE for cancer screening    Hx of BCC x's 2 on face within six months of each other treated with MOHS (Dr. Martinez) and plastics to closed next day Nov 2005- Venkatesh Montero   Hx of BCC right leg treated with surgery by derm in [place] about 4 years ago.              Review of Systems   Constitutional: Negative for fever, chills and fatigue.   Skin: Positive for daily sunscreen use and activity-related sunscreen use.   Hematologic/Lymphatic: Bruises/bleeds easily.        Objective:    Physical Exam   Constitutional: She appears well-developed and well-nourished. No distress.   Eyes: Lids are normal.  No conjunctival no injection.   Cardiovascular: There is no local extremity swelling and no dependent edema.     Neurological: She is alert and oriented to person, place, and time. She is not disoriented.   Psychiatric: She has a normal mood and affect.   Skin:   Areas Examined (abnormalities noted in diagram):   Scalp / Hair Palpated and Inspected  Head / Face Inspection Performed  Neck Inspection Performed  Chest / Axilla Inspection Performed  Abdomen Inspection Performed  Genitals / Buttocks / Groin Inspection Performed  Back Inspection Performed  RUE Inspected  LUE Inspection Performed  RLE Inspected  LLE Inspection Performed  Nails and Digits Inspection Performed                           Diagram Legend     Erythematous scaling macule/papule c/w actinic keratosis       Vascular papule c/w angioma      Pigmented verrucoid papule/plaque c/w seborrheic keratosis      Yellow umbilicated papule c/w sebaceous hyperplasia      Irregularly shaped tan macule c/w lentigo     1-2 mm smooth white papules consistent with Milia       Movable subcutaneous cyst with punctum c/w epidermal inclusion cyst      Subcutaneous movable cyst c/w pilar cyst      Firm pink to brown papule c/w dermatofibroma      Pedunculated fleshy papule(s) c/w skin tag(s)      Evenly pigmented macule c/w junctional nevus     Mildly variegated pigmented, slightly irregular-bordered macule c/w mildly atypical nevus      Flesh colored to evenly pigmented papule c/w intradermal nevus       Pink pearly papule/plaque c/w basal cell carcinoma      Erythematous hyperkeratotic cursted plaque c/w SCC      Surgical scar with no sign of skin cancer recurrence      Open and closed comedones      Inflammatory papules and pustules      Verrucoid papule consistent consistent with wart     Erythematous eczematous patches and plaques     Dystrophic onycholytic nail with subungual debris c/w onychomycosis     Umbilicated papule    Erythematous-base heme-crusted tan verrucoid plaque consistent with inflamed seborrheic keratosis     Erythematous Silvery Scaling Plaque c/w Psoriasis     See annotation      Assessment / Plan:        Actinic keratoses  Cryosurgery Procedure Note    Verbal consent from the patient is obtained and the patient is aware of the precancerous quality and need for treatment of these lesions. Liquid nitrogen cryosurgery is applied to the 3 actinic keratoses, as detailed in the physical exam, to produce a freeze injury. The patient is aware that blisters may form and is instructed on wound care with gentle cleansing and use of vaseline ointment to keep moist until healed. The patient is supplied a handout on cryosurgery and is instructed to call if lesions do not completely resolve.    History of nonmelanoma skin cancer  Area of previous NMSCs examined. Site well healed with no signs of recurrence.    Total body skin examination performed today including at least 12 points as noted in physical examination. No lesions suspicious for malignancy noted.    Inflamed seborrheic  keratosis  Procedure note for destruction via shave debulking:    Discussed risks of procedure including but not limited to infection, persistence of lesion, recurrence of lesion, and scar. Verbal consent obtained. Area cleaned with alcohol and anesthetized with 1% lidocaine with epinephrine. Lesion(s) shaved with sharp razor then base destroyed with hyfrecation. No complications.    Seborrheic keratoses  These are benign inherited growths without a malignant potential. Reassurance given to patient. No treatment is necessary.     Solar lentigo  This is a benign hyperpigmented sun induced lesion. Daily sun protection will reduce the number of new lesions. Treatment of these benign lesions are considered cosmetic.    Multiple benign nevi  Monitor for new mole or moles that are becoming bigger, darker, irritated, or developing irregular borders.    Patient instructed in importance in daily sun protection of at least spf 30. Mineral sunscreen ingredients preferred (Zinc +/- Titanium).   Recommend Elta MD for daily use on face and neck.  Patient encouraged to wear hat for all outdoor exposure.   Also discussed sun avoidance and use of protective clothing.           Follow up in about 1 year (around 8/20/2021).

## 2020-09-03 ENCOUNTER — TELEPHONE (OUTPATIENT)
Dept: FAMILY MEDICINE | Facility: CLINIC | Age: 70
End: 2020-09-03

## 2020-09-10 LAB
ALBUMIN SERPL-MCNC: 4.4 G/DL (ref 3.6–5.1)
ALBUMIN/CREAT UR: 5 MCG/MG CREAT
ALBUMIN/GLOB SERPL: 1.6 (CALC) (ref 1–2.5)
ALP SERPL-CCNC: 60 U/L (ref 37–153)
ALT SERPL-CCNC: 20 U/L (ref 6–29)
APPEARANCE UR: ABNORMAL
AST SERPL-CCNC: 16 U/L (ref 10–35)
BASOPHILS # BLD AUTO: 40 CELLS/UL (ref 0–200)
BASOPHILS NFR BLD AUTO: 0.7 %
BILIRUB SERPL-MCNC: 0.5 MG/DL (ref 0.2–1.2)
BILIRUB UR QL STRIP: NEGATIVE
BUN SERPL-MCNC: 17 MG/DL (ref 7–25)
BUN/CREAT SERPL: NORMAL (CALC) (ref 6–22)
CALCIUM SERPL-MCNC: 10.2 MG/DL (ref 8.6–10.4)
CHLORIDE SERPL-SCNC: 103 MMOL/L (ref 98–110)
CHOLEST SERPL-MCNC: 214 MG/DL
CHOLEST/HDLC SERPL: 3.2 (CALC)
CO2 SERPL-SCNC: 30 MMOL/L (ref 20–32)
COLOR UR: YELLOW
CREAT SERPL-MCNC: 0.76 MG/DL (ref 0.6–0.93)
CREAT UR-MCNC: 74 MG/DL (ref 20–275)
EOSINOPHIL # BLD AUTO: 131 CELLS/UL (ref 15–500)
EOSINOPHIL NFR BLD AUTO: 2.3 %
ERYTHROCYTE [DISTWIDTH] IN BLOOD BY AUTOMATED COUNT: 12.9 % (ref 11–15)
GFRSERPLBLD MDRD-ARVRAT: 78 ML/MIN/1.73M2
GLOBULIN SER CALC-MCNC: 2.7 G/DL (CALC) (ref 1.9–3.7)
GLUCOSE SERPL-MCNC: 91 MG/DL (ref 65–99)
GLUCOSE UR QL STRIP: NEGATIVE
HCT VFR BLD AUTO: 46.6 % (ref 35–45)
HDLC SERPL-MCNC: 66 MG/DL
HGB BLD-MCNC: 14.9 G/DL (ref 11.7–15.5)
HGB UR QL STRIP: NEGATIVE
KETONES UR QL STRIP: NEGATIVE
LDLC SERPL CALC-MCNC: 120 MG/DL (CALC)
LEUKOCYTE ESTERASE UR QL STRIP: NEGATIVE
LYMPHOCYTES # BLD AUTO: 1448 CELLS/UL (ref 850–3900)
LYMPHOCYTES NFR BLD AUTO: 25.4 %
MCH RBC QN AUTO: 28.4 PG (ref 27–33)
MCHC RBC AUTO-ENTMCNC: 32 G/DL (ref 32–36)
MCV RBC AUTO: 88.9 FL (ref 80–100)
MICROALBUMIN UR-MCNC: 0.4 MG/DL
MONOCYTES # BLD AUTO: 530 CELLS/UL (ref 200–950)
MONOCYTES NFR BLD AUTO: 9.3 %
NEUTROPHILS # BLD AUTO: 3551 CELLS/UL (ref 1500–7800)
NEUTROPHILS NFR BLD AUTO: 62.3 %
NITRITE UR QL STRIP: NEGATIVE
NONHDLC SERPL-MCNC: 148 MG/DL (CALC)
PH UR STRIP: 7.5 [PH] (ref 5–8)
PLATELET # BLD AUTO: 342 THOUSAND/UL (ref 140–400)
PMV BLD REES-ECKER: 11.2 FL (ref 7.5–12.5)
POTASSIUM SERPL-SCNC: 4.3 MMOL/L (ref 3.5–5.3)
PROT SERPL-MCNC: 7.1 G/DL (ref 6.1–8.1)
PROT UR QL STRIP: NEGATIVE
RBC # BLD AUTO: 5.24 MILLION/UL (ref 3.8–5.1)
SODIUM SERPL-SCNC: 140 MMOL/L (ref 135–146)
SP GR UR STRIP: 1.01 (ref 1–1.03)
TRIGL SERPL-MCNC: 162 MG/DL
WBC # BLD AUTO: 5.7 THOUSAND/UL (ref 3.8–10.8)

## 2020-09-17 ENCOUNTER — OFFICE VISIT (OUTPATIENT)
Dept: FAMILY MEDICINE | Facility: CLINIC | Age: 70
End: 2020-09-17
Payer: MEDICARE

## 2020-09-17 VITALS
SYSTOLIC BLOOD PRESSURE: 118 MMHG | HEIGHT: 62 IN | TEMPERATURE: 98 F | OXYGEN SATURATION: 95 % | HEART RATE: 72 BPM | WEIGHT: 154.63 LBS | BODY MASS INDEX: 28.46 KG/M2 | DIASTOLIC BLOOD PRESSURE: 80 MMHG

## 2020-09-17 DIAGNOSIS — I10 HTN (HYPERTENSION) WITH GOAL TO BE DETERMINED: ICD-10-CM

## 2020-09-17 DIAGNOSIS — N95.1 SYMPTOMATIC MENOPAUSAL OR FEMALE CLIMACTERIC STATES: Chronic | ICD-10-CM

## 2020-09-17 DIAGNOSIS — Z23 FLU VACCINE NEED: Primary | ICD-10-CM

## 2020-09-17 DIAGNOSIS — K21.9 GASTROESOPHAGEAL REFLUX DISEASE WITHOUT ESOPHAGITIS: ICD-10-CM

## 2020-09-17 DIAGNOSIS — E78.01 FAMILIAL HYPERCHOLESTEROLEMIA: ICD-10-CM

## 2020-09-17 DIAGNOSIS — M81.0 AGE-RELATED OSTEOPOROSIS WITHOUT CURRENT PATHOLOGICAL FRACTURE: ICD-10-CM

## 2020-09-17 PROCEDURE — 1159F MED LIST DOCD IN RCRD: CPT | Mod: S$GLB,,, | Performed by: FAMILY MEDICINE

## 2020-09-17 PROCEDURE — 90662 IIV NO PRSV INCREASED AG IM: CPT | Mod: S$GLB,,, | Performed by: FAMILY MEDICINE

## 2020-09-17 PROCEDURE — 3008F PR BODY MASS INDEX (BMI) DOCUMENTED: ICD-10-PCS | Mod: S$GLB,,, | Performed by: FAMILY MEDICINE

## 2020-09-17 PROCEDURE — 1159F PR MEDICATION LIST DOCUMENTED IN MEDICAL RECORD: ICD-10-PCS | Mod: S$GLB,,, | Performed by: FAMILY MEDICINE

## 2020-09-17 PROCEDURE — 90662 FLU VACCINE - QUADRIVALENT - HIGH DOSE (65+) PRESERVATIVE FREE IM: ICD-10-PCS | Mod: S$GLB,,, | Performed by: FAMILY MEDICINE

## 2020-09-17 PROCEDURE — 3074F PR MOST RECENT SYSTOLIC BLOOD PRESSURE < 130 MM HG: ICD-10-PCS | Mod: S$GLB,,, | Performed by: FAMILY MEDICINE

## 2020-09-17 PROCEDURE — 3079F PR MOST RECENT DIASTOLIC BLOOD PRESSURE 80-89 MM HG: ICD-10-PCS | Mod: S$GLB,,, | Performed by: FAMILY MEDICINE

## 2020-09-17 PROCEDURE — 3079F DIAST BP 80-89 MM HG: CPT | Mod: S$GLB,,, | Performed by: FAMILY MEDICINE

## 2020-09-17 PROCEDURE — G0008 FLU VACCINE - QUADRIVALENT - HIGH DOSE (65+) PRESERVATIVE FREE IM: ICD-10-PCS | Mod: S$GLB,,, | Performed by: FAMILY MEDICINE

## 2020-09-17 PROCEDURE — 3074F SYST BP LT 130 MM HG: CPT | Mod: S$GLB,,, | Performed by: FAMILY MEDICINE

## 2020-09-17 PROCEDURE — G0008 ADMIN INFLUENZA VIRUS VAC: HCPCS | Mod: S$GLB,,, | Performed by: FAMILY MEDICINE

## 2020-09-17 PROCEDURE — 1101F PR PT FALLS ASSESS DOC 0-1 FALLS W/OUT INJ PAST YR: ICD-10-PCS | Mod: S$GLB,,, | Performed by: FAMILY MEDICINE

## 2020-09-17 PROCEDURE — 3008F BODY MASS INDEX DOCD: CPT | Mod: S$GLB,,, | Performed by: FAMILY MEDICINE

## 2020-09-17 PROCEDURE — 99214 OFFICE O/P EST MOD 30 MIN: CPT | Mod: 25,S$GLB,, | Performed by: FAMILY MEDICINE

## 2020-09-17 PROCEDURE — 1101F PT FALLS ASSESS-DOCD LE1/YR: CPT | Mod: S$GLB,,, | Performed by: FAMILY MEDICINE

## 2020-09-17 PROCEDURE — 1126F PR PAIN SEVERITY QUANTIFIED, NO PAIN PRESENT: ICD-10-PCS | Mod: S$GLB,,, | Performed by: FAMILY MEDICINE

## 2020-09-17 PROCEDURE — 99214 PR OFFICE/OUTPT VISIT, EST, LEVL IV, 30-39 MIN: ICD-10-PCS | Mod: 25,S$GLB,, | Performed by: FAMILY MEDICINE

## 2020-09-17 PROCEDURE — 1126F AMNT PAIN NOTED NONE PRSNT: CPT | Mod: S$GLB,,, | Performed by: FAMILY MEDICINE

## 2020-09-17 RX ORDER — RISEDRONATE SODIUM 35 MG/1
1 TABLET, DELAYED RELEASE ORAL
Qty: 12 TABLET | Refills: 1 | Status: SHIPPED | OUTPATIENT
Start: 2020-09-17 | End: 2021-03-17

## 2020-09-17 RX ORDER — ESTRADIOL 0.5 MG/1
0.5 TABLET ORAL DAILY
Qty: 90 TABLET | Refills: 1 | Status: SHIPPED | OUTPATIENT
Start: 2020-09-17 | End: 2021-03-17 | Stop reason: SDUPTHER

## 2020-09-17 RX ORDER — PITAVASTATIN CALCIUM 4.18 MG/1
0.5 TABLET, FILM COATED ORAL DAILY
Qty: 30 TABLET | Refills: 3 | Status: CANCELLED | OUTPATIENT
Start: 2020-09-17

## 2020-09-17 RX ORDER — HYDROCHLOROTHIAZIDE 12.5 MG/1
12.5 TABLET ORAL DAILY
Qty: 90 TABLET | Refills: 1 | Status: CANCELLED | OUTPATIENT
Start: 2020-09-17

## 2020-09-17 NOTE — PROGRESS NOTES
SUBJECTIVE:    Patient ID: Shahana Mcknight is a 70 y.o. female.    Chief Complaint: Hypertension (follow up) and Bottles brought    Pt here to checkup on HTN, Osteoporosis, Menopause, GERD, and hyperlipidemia, allergic rhinitis    BP is doing ok today. Denies CP/SOB.    Pt reports being compliant with atelvia weekly for osteoporosis (doing it through [facility] right now as it is cheaper).  Has intolerance to other forms that have to be taken on an empty stomach, causing her nausea and vomitting.  Also taking calcium as well.      Cont to have hot flashes at night despite being on estradiol, on 1/2 tablet now. Continues to try to wean eventually.     Denies heartburn.  Just using rolaids or tums about once a month.    Allergies are under reasonable control with astepro.  Having some post nasal drip.    Left knee pain/arthritis (Dr. Allen) is not limiting activity at this time.  She will need injections with synthetic material if any worse.    Had labs done. Has been eating a lot of crap. .     -----------------------------------------------------  Continues to see Dr. Reza for her glaucoma.  Last mammogram was 2/2020. DEXA 3/2020  Cscope scheduled for May 1 with Dr. Almeida      Lab Visit on 05/28/2020   Component Date Value Ref Range Status    SARS-CoV2 (COVID-19) Qualitative P* 05/28/2020 Not Detected  Not Detected Final    Comment: This test utilizes a real-time reverse transcription  polymerase chain reaction procedure to amplify and   detect the SARS-CoV-2 RdRp and N genes.    The analytical sensitivity (limit of detection) of   this assay is 100 copies/mL.   A Detected result is considered positive for COVID-19.  This patient is considered infected with the   SARS-CoV-2 virus and is presumed to be contagious.    A Not Detected result means that SARS-CoV-2 RNA is not  present above the limit of detection. It does not rule  out the possibility of COVID-19 and should not be the  sole basis for  treatment decisions.  If COVID-19 is   strongly suspected based on clinical and exposure   history,re-testing should be considered.    This test is only for use under Food and Drug   Administration s Emergency Use Authorization (EUA).   Commercial reagents are provided by [employer].  Performance characteristics of the EUA have been   independently verified by [facility]   Department of Pathology and L                           [facility].       Office Visit on 03/11/2020   Component Date Value Ref Range Status    Cholesterol 09/09/2020 214* <200 mg/dL Final    HDL 09/09/2020 66  > OR = 50 mg/dL Final    Triglycerides 09/09/2020 162* <150 mg/dL Final    LDL Cholesterol 09/09/2020 120* mg/dL (calc) Final    Comment: Reference range: <100     Desirable range <100 mg/dL for primary prevention;    <70 mg/dL for patients with CHD or diabetic patients   with > or = 2 CHD risk factors.     LDL-C is now calculated using the Sarabjit-Kavitha   calculation, which is a validated novel method providing   better accuracy than the Friedewald equation in the   estimation of LDL-C.   Sarabjit BALDERAS et al. JOSIAS. 2013;310(19): 0878-8809   (http://education.[employer]/faq/EME839)      Hdl/Cholesterol Ratio 09/09/2020 3.2  <5.0 (calc) Final    Non HDL Chol. (LDL+VLDL) 09/09/2020 148* <130 mg/dL (calc) Final    Comment: For patients with diabetes plus 1 major ASCVD risk   factor, treating to a non-HDL-C goal of <100 mg/dL   (LDL-C of <70 mg/dL) is considered a therapeutic   option.      Glucose 09/09/2020 91  65 - 99 mg/dL Final    Comment:               Fasting reference interval         BUN, Bld 09/09/2020 17  7 - 25 mg/dL Final    Creatinine 09/09/2020 0.76  0.60 - 0.93 mg/dL Final    Comment: For patients >49 years of age, the reference limit  for Creatinine is approximately 13% higher for people  identified as -American.         eGFR if non African American 09/09/2020 78  > OR = 60  mL/min/1.73m2 Final    eGFR if African American 09/09/2020 91  > OR = 60 mL/min/1.73m2 Final    BUN/Creatinine Ratio 09/09/2020 NOT APPLICABLE  6 - 22 (calc) Final    Sodium 09/09/2020 140  135 - 146 mmol/L Final    Potassium 09/09/2020 4.3  3.5 - 5.3 mmol/L Final    Chloride 09/09/2020 103  98 - 110 mmol/L Final    CO2 09/09/2020 30  20 - 32 mmol/L Final    Calcium 09/09/2020 10.2  8.6 - 10.4 mg/dL Final    Total Protein 09/09/2020 7.1  6.1 - 8.1 g/dL Final    Albumin 09/09/2020 4.4  3.6 - 5.1 g/dL Final    Globulin, Total 09/09/2020 2.7  1.9 - 3.7 g/dL (calc) Final    Albumin/Globulin Ratio 09/09/2020 1.6  1.0 - 2.5 (calc) Final    Total Bilirubin 09/09/2020 0.5  0.2 - 1.2 mg/dL Final    Alkaline Phosphatase 09/09/2020 60  37 - 153 U/L Final    AST 09/09/2020 16  10 - 35 U/L Final    ALT 09/09/2020 20  6 - 29 U/L Final    WBC 09/09/2020 5.7  3.8 - 10.8 Thousand/uL Final    RBC 09/09/2020 5.24* 3.80 - 5.10 Million/uL Final    Hemoglobin 09/09/2020 14.9  11.7 - 15.5 g/dL Final    Hematocrit 09/09/2020 46.6* 35.0 - 45.0 % Final    Mean Corpuscular Volume 09/09/2020 88.9  80.0 - 100.0 fL Final    Mean Corpuscular Hemoglobin 09/09/2020 28.4  27.0 - 33.0 pg Final    Mean Corpuscular Hemoglobin Conc 09/09/2020 32.0  32.0 - 36.0 g/dL Final    RDW 09/09/2020 12.9  11.0 - 15.0 % Final    Platelets 09/09/2020 342  140 - 400 Thousand/uL Final    MPV 09/09/2020 11.2  7.5 - 12.5 fL Final    Neutrophils Absolute 09/09/2020 3,551  1,500 - 7,800 cells/uL Final    Lymph # 09/09/2020 1,448  850 - 3,900 cells/uL Final    Mono # 09/09/2020 530  200 - 950 cells/uL Final    Eos # 09/09/2020 131  15 - 500 cells/uL Final    Baso # 09/09/2020 40  0 - 200 cells/uL Final    Neutrophils Relative 09/09/2020 62.3  % Final    Lymph% 09/09/2020 25.4  % Final    Mono% 09/09/2020 9.3  % Final    Eosinophil% 09/09/2020 2.3  % Final    Basophil% 09/09/2020 0.7  % Final    Creatinine, Random Ur 09/09/2020 74  20 -  275 mg/dL Final    Microalb, Ur 09/09/2020 0.4  See Note: mg/dL Final    Comment: Reference Range:  Reference Range  Not established      Microalb Creat Ratio 09/09/2020 5  <30 mcg/mg creat Final    Comment:    The ADA defines abnormalities in albumin  excretion as follows:     Category         Result (mcg/mg creatinine)     Normal                    <30  Microalbuminuria            Clinical albuminuria   > OR = 300     The ADA recommends that at least two of three  specimens collected within a 3-6 month period be  abnormal before considering a patient to be  within a diagnostic category.      Color, UA 09/09/2020 YELLOW  YELLOW Final    Appearance, UA 09/09/2020 CLOUDY* CLEAR Final    Specific [place], UA 09/09/2020 1.014  1.001 - 1.035 Final    pH, UA 09/09/2020 7.5  5.0 - 8.0 Final    Glucose, UA 09/09/2020 NEGATIVE  NEGATIVE Final    Bilirubin, UA 09/09/2020 NEGATIVE  NEGATIVE Final    Ketones, UA 09/09/2020 NEGATIVE  NEGATIVE Final    Occult Blood UA 09/09/2020 NEGATIVE  NEGATIVE Final    Protein, UA 09/09/2020 NEGATIVE  NEGATIVE Final    Nitrite, UA 09/09/2020 NEGATIVE  NEGATIVE Final    Leukocytes, UA 09/09/2020 NEGATIVE  NEGATIVE Final       Lab Visit on 05/28/2020   Component Date Value Ref Range Status    SARS-CoV2 (COVID-19) Qualitative P* 05/28/2020 Not Detected  Not Detected Final       Past Medical History:   Diagnosis Date    Basal cell carcinoma 2005    R upper cut lip    Hyperlipidemia     Osteopenia      Social History     Socioeconomic History    Marital status:      Spouse name: Not on file    Number of children: Not on file    Years of education: Not on file    Highest education level: Not on file   Occupational History    Not on file   Social Needs    Financial resource strain: Not very hard    Food insecurity     Worry: Never true     Inability: Never true    Transportation needs     Medical: No     Non-medical: No   Tobacco Use    Smoking status: Former  Smoker    Smokeless tobacco: Never Used   Substance and Sexual Activity    Alcohol use: Yes     Frequency: 2-3 times a week     Drinks per session: 1 or 2     Binge frequency: Never     Comment: socially    Drug use: No    Sexual activity: Yes     Partners: Male   Lifestyle    Physical activity     Days per week: 4 days     Minutes per session: 50 min    Stress: Not at all   Relationships    Social connections     Talks on phone: More than three times a week     Gets together: Twice a week     Attends Buddhist service: Not on file     Active member of club or organization: Patient refused     Attends meetings of clubs or organizations: Patient refused     Relationship status:    Other Topics Concern    Are you pregnant or think you may be? Not Asked    Breast-feeding Not Asked   Social History Narrative    Not on file     Past Surgical History:   Procedure Laterality Date    basil cell carcinoma removal      BREAST CYST ASPIRATION      HYSTERECTOMY      SINUS SURGERY       Family History   Problem Relation Age of Onset    Cancer Mother     Melanoma Neg Hx     Psoriasis Neg Hx     Lupus Neg Hx     Eczema Neg Hx        Review of patient's allergies indicates:   Allergen Reactions    Alendronic acid     Boniva [ibandronate]     Fosamax [alendronate] Nausea And Vomiting    Hydrocodone-acetaminophen     Hydrocodone-cpm-pseudoephed     Ibandronic acid     Lovastatin     Oxycodone     Rosuvastatin        Current Outpatient Medications:     azelastine (ASTELIN) 137 mcg (0.1 %) nasal spray, 1 spray (137 mcg total) by Nasal route 2 (two) times daily., Disp: 30 mL, Rfl: 5    estradioL (ESTRACE) 0.5 MG tablet, Take 1 tablet (0.5 mg total) by mouth once daily., Disp: 90 tablet, Rfl: 1    hydroCHLOROthiazide (HYDRODIURIL) 12.5 MG Tab, , Disp: , Rfl: 3    latanoprost, PF, 0.005 % Drop, Apply to eye., Disp: , Rfl:     pitavastatin calcium (LIVALO) 4 mg Tab, Take 0.5 tablets by mouth., Disp:  ", Rfl:     risedronate 35 mg TbEC, Take 1 tablet (35 mg total) by mouth every 7 days., Disp: 12 tablet, Rfl: 1    Review of Systems   Constitutional: Negative for appetite change, fatigue, fever and unexpected weight change.   HENT: Negative for ear pain, postnasal drip, sinus pain and sore throat.    Respiratory: Negative for cough, shortness of breath and wheezing.    Cardiovascular: Negative for chest pain and leg swelling.   Gastrointestinal: Negative for abdominal pain, constipation, nausea and vomiting.        -heartburn   Genitourinary: Negative for difficulty urinating, dysuria and frequency.        +Hot flashes   Musculoskeletal: Negative for back pain, myalgias and neck pain.   Skin: Negative for rash.   Neurological: Negative for weakness, numbness and headaches.   Hematological: Does not bruise/bleed easily.   Psychiatric/Behavioral: Negative for dysphoric mood, sleep disturbance and suicidal ideas. The patient is not nervous/anxious.           Objective:      Vitals:    09/17/20 0806   BP: 118/80   Pulse: 72   Temp: 97.8 °F (36.6 °C)   SpO2: 95%   Weight: 70.1 kg (154 lb 9.6 oz)   Height: 5' 2" (1.575 m)     Physical Exam  Vitals signs reviewed.   Constitutional:       General: She is not in acute distress.     Appearance: She is well-developed and overweight.   HENT:      Head: Normocephalic and atraumatic.   Neck:      Musculoskeletal: Neck supple.      Thyroid: No thyromegaly.   Cardiovascular:      Rate and Rhythm: Normal rate and regular rhythm.      Heart sounds: Normal heart sounds. No murmur. No friction rub.   Pulmonary:      Effort: Pulmonary effort is normal.      Breath sounds: Normal breath sounds. No wheezing or rales.   Abdominal:      General: Bowel sounds are normal. There is no distension.      Palpations: Abdomen is soft.      Tenderness: There is no abdominal tenderness.   Lymphadenopathy:      Cervical: No cervical adenopathy.   Skin:     General: Skin is warm and dry.      " Findings: No rash.   Neurological:      Mental Status: She is alert and oriented to person, place, and time.   Psychiatric:         Attention and Perception: She is attentive.         Speech: Speech normal.         Behavior: Behavior normal.         Thought Content: Thought content normal.         Judgment: Judgment normal.           Assessment:       1. Flu vaccine need    2. HTN (hypertension) with goal to be determined    3. Familial hypercholesterolemia    4. Age-related osteoporosis without current pathological fracture    5. Symptomatic menopausal or female climacteric states         Plan:       Flu vaccine need  -     Influenza - Quadrivalent - High Dose (65+) (PF) (IM)    HTN (hypertension) with goal to be determined    Familial hypercholesterolemia    Age-related osteoporosis without current pathological fracture  Comments:  Stable on current treatment without side effects. will cont to monitor, DEXA will be repeated every 2 years.    Symptomatic menopausal or female climacteric states  Comments:  Asymptomatic on estradiol. Will cont to monitor. Pt to cont regular mammograms. Aware of risks/benefits of use.    Other  Lab results discussed and reviewed with patient.    No follow-ups on file.        9/17/2020 Jeanmarie Yoder

## 2021-01-12 ENCOUNTER — IMMUNIZATION (OUTPATIENT)
Dept: PRIMARY CARE CLINIC | Facility: CLINIC | Age: 71
End: 2021-01-12
Payer: MEDICARE

## 2021-01-12 DIAGNOSIS — Z23 NEED FOR VACCINATION: ICD-10-CM

## 2021-01-12 PROCEDURE — 0001A COVID-19, MRNA, LNP-S, PF, 30 MCG/0.3 ML DOSE VACCINE: ICD-10-PCS | Mod: S$GLB,,, | Performed by: FAMILY MEDICINE

## 2021-01-12 PROCEDURE — 0001A COVID-19, MRNA, LNP-S, PF, 30 MCG/0.3 ML DOSE VACCINE: CPT | Mod: S$GLB,,, | Performed by: FAMILY MEDICINE

## 2021-01-12 PROCEDURE — 91300 COVID-19, MRNA, LNP-S, PF, 30 MCG/0.3 ML DOSE VACCINE: ICD-10-PCS | Mod: S$GLB,,, | Performed by: FAMILY MEDICINE

## 2021-01-12 PROCEDURE — 91300 COVID-19, MRNA, LNP-S, PF, 30 MCG/0.3 ML DOSE VACCINE: CPT | Mod: S$GLB,,, | Performed by: FAMILY MEDICINE

## 2021-02-02 ENCOUNTER — IMMUNIZATION (OUTPATIENT)
Dept: PRIMARY CARE CLINIC | Facility: CLINIC | Age: 71
End: 2021-02-02
Payer: MEDICARE

## 2021-02-02 DIAGNOSIS — Z23 NEED FOR VACCINATION: Primary | ICD-10-CM

## 2021-02-02 PROCEDURE — 0002A COVID-19, MRNA, LNP-S, PF, 30 MCG/0.3 ML DOSE VACCINE: ICD-10-PCS | Mod: CV19,S$GLB,, | Performed by: FAMILY MEDICINE

## 2021-02-02 PROCEDURE — 0002A COVID-19, MRNA, LNP-S, PF, 30 MCG/0.3 ML DOSE VACCINE: CPT | Mod: CV19,S$GLB,, | Performed by: FAMILY MEDICINE

## 2021-02-02 PROCEDURE — 91300 COVID-19, MRNA, LNP-S, PF, 30 MCG/0.3 ML DOSE VACCINE: ICD-10-PCS | Mod: S$GLB,,, | Performed by: FAMILY MEDICINE

## 2021-02-02 PROCEDURE — 91300 COVID-19, MRNA, LNP-S, PF, 30 MCG/0.3 ML DOSE VACCINE: CPT | Mod: S$GLB,,, | Performed by: FAMILY MEDICINE

## 2021-02-23 ENCOUNTER — TELEPHONE (OUTPATIENT)
Dept: FAMILY MEDICINE | Facility: CLINIC | Age: 71
End: 2021-02-23

## 2021-02-23 DIAGNOSIS — Z12.31 SCREENING MAMMOGRAM, ENCOUNTER FOR: Primary | ICD-10-CM

## 2021-03-03 ENCOUNTER — TELEPHONE (OUTPATIENT)
Dept: FAMILY MEDICINE | Facility: CLINIC | Age: 71
End: 2021-03-03

## 2021-03-03 ENCOUNTER — HOSPITAL ENCOUNTER (OUTPATIENT)
Dept: RADIOLOGY | Facility: HOSPITAL | Age: 71
Discharge: HOME | End: 2021-03-03
Attending: FAMILY MEDICINE
Payer: MEDICARE

## 2021-03-03 DIAGNOSIS — Z12.31 SCREENING MAMMOGRAM, ENCOUNTER FOR: ICD-10-CM

## 2021-03-03 DIAGNOSIS — Z79.899 ENCOUNTER FOR LONG-TERM (CURRENT) USE OF OTHER MEDICATIONS: Primary | ICD-10-CM

## 2021-03-03 DIAGNOSIS — I10 HTN (HYPERTENSION) WITH GOAL TO BE DETERMINED: ICD-10-CM

## 2021-03-03 PROCEDURE — 77067 SCR MAMMO BI INCL CAD: CPT | Mod: 26,,, | Performed by: RADIOLOGY

## 2021-03-03 PROCEDURE — 77067 SCR MAMMO BI INCL CAD: CPT | Mod: TC

## 2021-03-03 PROCEDURE — 77067 MAMMO DIGITAL SCREENING BILAT WITH TOMO: ICD-10-PCS | Mod: 26,,, | Performed by: RADIOLOGY

## 2021-03-03 PROCEDURE — 77063 BREAST TOMOSYNTHESIS BI: CPT | Mod: 26,,, | Performed by: RADIOLOGY

## 2021-03-03 PROCEDURE — 77063 MAMMO DIGITAL SCREENING BILAT WITH TOMO: ICD-10-PCS | Mod: 26,,, | Performed by: RADIOLOGY

## 2021-03-07 ENCOUNTER — PATIENT MESSAGE (OUTPATIENT)
Dept: FAMILY MEDICINE | Facility: CLINIC | Age: 71
End: 2021-03-07

## 2021-03-09 ENCOUNTER — TELEPHONE (OUTPATIENT)
Dept: FAMILY MEDICINE | Facility: CLINIC | Age: 71
End: 2021-03-09

## 2021-03-11 LAB
ALBUMIN SERPL-MCNC: 4.7 G/DL (ref 3.6–5.1)
ALBUMIN/CREAT UR: 5 MCG/MG CREAT
ALBUMIN/GLOB SERPL: 1.7 (CALC) (ref 1–2.5)
ALP SERPL-CCNC: 64 U/L (ref 37–153)
ALT SERPL-CCNC: 23 U/L (ref 6–29)
APPEARANCE UR: ABNORMAL
AST SERPL-CCNC: 21 U/L (ref 10–35)
BACTERIA #/AREA URNS HPF: ABNORMAL /HPF
BACTERIA UR CULT: ABNORMAL
BASOPHILS # BLD AUTO: 48 CELLS/UL (ref 0–200)
BASOPHILS NFR BLD AUTO: 1.2 %
BILIRUB SERPL-MCNC: 0.5 MG/DL (ref 0.2–1.2)
BILIRUB UR QL STRIP: NEGATIVE
BUN SERPL-MCNC: 11 MG/DL (ref 7–25)
BUN/CREAT SERPL: ABNORMAL (CALC) (ref 6–22)
CALCIUM SERPL-MCNC: 10.8 MG/DL (ref 8.6–10.4)
CAOX CRY #/AREA URNS HPF: ABNORMAL /HPF
CHLORIDE SERPL-SCNC: 102 MMOL/L (ref 98–110)
CHOLEST SERPL-MCNC: 178 MG/DL
CHOLEST/HDLC SERPL: 3.4 (CALC)
CO2 SERPL-SCNC: 30 MMOL/L (ref 20–32)
COLOR UR: YELLOW
CREAT SERPL-MCNC: 0.77 MG/DL (ref 0.6–0.93)
CREAT UR-MCNC: 124 MG/DL (ref 20–275)
EOSINOPHIL # BLD AUTO: 180 CELLS/UL (ref 15–500)
EOSINOPHIL NFR BLD AUTO: 4.5 %
ERYTHROCYTE [DISTWIDTH] IN BLOOD BY AUTOMATED COUNT: 12.4 % (ref 11–15)
GFRSERPLBLD MDRD-ARVRAT: 77 ML/MIN/1.73M2
GLOBULIN SER CALC-MCNC: 2.7 G/DL (CALC) (ref 1.9–3.7)
GLUCOSE SERPL-MCNC: 94 MG/DL (ref 65–99)
GLUCOSE UR QL STRIP: NEGATIVE
HCT VFR BLD AUTO: 45.3 % (ref 35–45)
HDLC SERPL-MCNC: 53 MG/DL
HGB BLD-MCNC: 14.7 G/DL (ref 11.7–15.5)
HGB UR QL STRIP: NEGATIVE
HYALINE CASTS #/AREA URNS LPF: ABNORMAL /LPF
KETONES UR QL STRIP: ABNORMAL
LDLC SERPL CALC-MCNC: 108 MG/DL (CALC)
LEUKOCYTE ESTERASE UR QL STRIP: NEGATIVE
LYMPHOCYTES # BLD AUTO: 1368 CELLS/UL (ref 850–3900)
LYMPHOCYTES NFR BLD AUTO: 34.2 %
MCH RBC QN AUTO: 28.5 PG (ref 27–33)
MCHC RBC AUTO-ENTMCNC: 32.5 G/DL (ref 32–36)
MCV RBC AUTO: 88 FL (ref 80–100)
MICROALBUMIN UR-MCNC: 0.6 MG/DL
MONOCYTES # BLD AUTO: 468 CELLS/UL (ref 200–950)
MONOCYTES NFR BLD AUTO: 11.7 %
NEUTROPHILS # BLD AUTO: 1936 CELLS/UL (ref 1500–7800)
NEUTROPHILS NFR BLD AUTO: 48.4 %
NITRITE UR QL STRIP: NEGATIVE
NONHDLC SERPL-MCNC: 125 MG/DL (CALC)
PH UR STRIP: ABNORMAL [PH] (ref 5–8)
PLATELET # BLD AUTO: 329 THOUSAND/UL (ref 140–400)
PMV BLD REES-ECKER: 11.8 FL (ref 7.5–12.5)
POTASSIUM SERPL-SCNC: 4.3 MMOL/L (ref 3.5–5.3)
PROT SERPL-MCNC: 7.4 G/DL (ref 6.1–8.1)
PROT UR QL STRIP: NEGATIVE
RBC # BLD AUTO: 5.15 MILLION/UL (ref 3.8–5.1)
RBC #/AREA URNS HPF: ABNORMAL /HPF
SERVICE CMNT-IMP: ABNORMAL
SODIUM SERPL-SCNC: 139 MMOL/L (ref 135–146)
SP GR UR STRIP: 1.01 (ref 1–1.03)
SQUAMOUS #/AREA URNS HPF: ABNORMAL /HPF
TRIGL SERPL-MCNC: 76 MG/DL
TSH SERPL-ACNC: 2.98 MIU/L (ref 0.4–4.5)
WBC # BLD AUTO: 4 THOUSAND/UL (ref 3.8–10.8)
WBC #/AREA URNS HPF: ABNORMAL /HPF

## 2021-03-17 ENCOUNTER — OFFICE VISIT (OUTPATIENT)
Dept: FAMILY MEDICINE | Facility: CLINIC | Age: 71
End: 2021-03-17
Payer: MEDICARE

## 2021-03-17 VITALS
BODY MASS INDEX: 27.42 KG/M2 | DIASTOLIC BLOOD PRESSURE: 80 MMHG | HEART RATE: 75 BPM | HEIGHT: 62 IN | WEIGHT: 149 LBS | SYSTOLIC BLOOD PRESSURE: 124 MMHG

## 2021-03-17 DIAGNOSIS — E78.2 MIXED HYPERLIPIDEMIA: ICD-10-CM

## 2021-03-17 DIAGNOSIS — I10 HTN (HYPERTENSION) WITH GOAL TO BE DETERMINED: ICD-10-CM

## 2021-03-17 DIAGNOSIS — K21.9 GASTROESOPHAGEAL REFLUX DISEASE WITHOUT ESOPHAGITIS: ICD-10-CM

## 2021-03-17 DIAGNOSIS — M81.0 AGE-RELATED OSTEOPOROSIS WITHOUT CURRENT PATHOLOGICAL FRACTURE: Primary | ICD-10-CM

## 2021-03-17 DIAGNOSIS — N95.1 SYMPTOMATIC MENOPAUSAL OR FEMALE CLIMACTERIC STATES: Chronic | ICD-10-CM

## 2021-03-17 PROCEDURE — 3288F FALL RISK ASSESSMENT DOCD: CPT | Mod: S$GLB,,, | Performed by: FAMILY MEDICINE

## 2021-03-17 PROCEDURE — 3079F DIAST BP 80-89 MM HG: CPT | Mod: S$GLB,,, | Performed by: FAMILY MEDICINE

## 2021-03-17 PROCEDURE — 3074F PR MOST RECENT SYSTOLIC BLOOD PRESSURE < 130 MM HG: ICD-10-PCS | Mod: S$GLB,,, | Performed by: FAMILY MEDICINE

## 2021-03-17 PROCEDURE — 1159F PR MEDICATION LIST DOCUMENTED IN MEDICAL RECORD: ICD-10-PCS | Mod: S$GLB,,, | Performed by: FAMILY MEDICINE

## 2021-03-17 PROCEDURE — 1101F PR PT FALLS ASSESS DOC 0-1 FALLS W/OUT INJ PAST YR: ICD-10-PCS | Mod: S$GLB,,, | Performed by: FAMILY MEDICINE

## 2021-03-17 PROCEDURE — 3079F PR MOST RECENT DIASTOLIC BLOOD PRESSURE 80-89 MM HG: ICD-10-PCS | Mod: S$GLB,,, | Performed by: FAMILY MEDICINE

## 2021-03-17 PROCEDURE — 3288F PR FALLS RISK ASSESSMENT DOCUMENTED: ICD-10-PCS | Mod: S$GLB,,, | Performed by: FAMILY MEDICINE

## 2021-03-17 PROCEDURE — 99214 PR OFFICE/OUTPT VISIT, EST, LEVL IV, 30-39 MIN: ICD-10-PCS | Mod: S$GLB,,, | Performed by: FAMILY MEDICINE

## 2021-03-17 PROCEDURE — 3008F PR BODY MASS INDEX (BMI) DOCUMENTED: ICD-10-PCS | Mod: S$GLB,,, | Performed by: FAMILY MEDICINE

## 2021-03-17 PROCEDURE — 3008F BODY MASS INDEX DOCD: CPT | Mod: S$GLB,,, | Performed by: FAMILY MEDICINE

## 2021-03-17 PROCEDURE — 99214 OFFICE O/P EST MOD 30 MIN: CPT | Mod: S$GLB,,, | Performed by: FAMILY MEDICINE

## 2021-03-17 PROCEDURE — 1159F MED LIST DOCD IN RCRD: CPT | Mod: S$GLB,,, | Performed by: FAMILY MEDICINE

## 2021-03-17 PROCEDURE — 3074F SYST BP LT 130 MM HG: CPT | Mod: S$GLB,,, | Performed by: FAMILY MEDICINE

## 2021-03-17 PROCEDURE — 1101F PT FALLS ASSESS-DOCD LE1/YR: CPT | Mod: S$GLB,,, | Performed by: FAMILY MEDICINE

## 2021-03-17 RX ORDER — HYDROCHLOROTHIAZIDE 12.5 MG/1
12.5 TABLET ORAL DAILY
Qty: 90 TABLET | Refills: 1 | Status: SHIPPED | OUTPATIENT
Start: 2021-03-17 | End: 2021-09-14 | Stop reason: SDUPTHER

## 2021-03-17 RX ORDER — IBANDRONATE SODIUM 150 MG/1
150 TABLET, FILM COATED ORAL
Qty: 1 TABLET | Refills: 12 | Status: SHIPPED | OUTPATIENT
Start: 2021-03-17 | End: 2021-03-18

## 2021-03-17 RX ORDER — EZETIMIBE 10 MG/1
10 TABLET ORAL DAILY
COMMUNITY
End: 2021-09-14

## 2021-03-17 RX ORDER — ESTRADIOL 0.5 MG/1
0.5 TABLET ORAL DAILY
Qty: 90 TABLET | Refills: 1 | Status: SHIPPED | OUTPATIENT
Start: 2021-03-17 | End: 2022-03-14 | Stop reason: SDUPTHER

## 2021-03-18 DIAGNOSIS — M81.0 AGE-RELATED OSTEOPOROSIS WITHOUT CURRENT PATHOLOGICAL FRACTURE: Primary | ICD-10-CM

## 2021-03-18 DIAGNOSIS — J30.1 SEASONAL ALLERGIC RHINITIS DUE TO POLLEN: ICD-10-CM

## 2021-03-18 RX ORDER — AZELASTINE 1 MG/ML
1 SPRAY, METERED NASAL 2 TIMES DAILY
Qty: 30 ML | Refills: 5 | Status: SHIPPED | OUTPATIENT
Start: 2021-03-18 | End: 2022-03-14 | Stop reason: SDUPTHER

## 2021-04-22 RX ORDER — RISEDRONATE SODIUM 35 MG/1
35 TABLET, FILM COATED ORAL
Qty: 4 TABLET | Refills: 0 | Status: SHIPPED | OUTPATIENT
Start: 2021-04-22 | End: 2021-06-01 | Stop reason: SDUPTHER

## 2021-06-01 DIAGNOSIS — M81.0 AGE-RELATED OSTEOPOROSIS WITHOUT CURRENT PATHOLOGICAL FRACTURE: ICD-10-CM

## 2021-06-01 RX ORDER — RISEDRONATE SODIUM 35 MG/1
35 TABLET, FILM COATED ORAL
Qty: 12 TABLET | Refills: 1 | Status: SHIPPED | OUTPATIENT
Start: 2021-06-01 | End: 2021-09-14 | Stop reason: SDUPTHER

## 2021-09-08 ENCOUNTER — TELEPHONE (OUTPATIENT)
Dept: FAMILY MEDICINE | Facility: CLINIC | Age: 71
End: 2021-09-08

## 2021-09-14 ENCOUNTER — OFFICE VISIT (OUTPATIENT)
Dept: FAMILY MEDICINE | Facility: CLINIC | Age: 71
End: 2021-09-14
Payer: MEDICARE

## 2021-09-14 VITALS
HEIGHT: 62 IN | DIASTOLIC BLOOD PRESSURE: 82 MMHG | BODY MASS INDEX: 25.95 KG/M2 | SYSTOLIC BLOOD PRESSURE: 130 MMHG | HEART RATE: 60 BPM | WEIGHT: 141 LBS

## 2021-09-14 DIAGNOSIS — Z13.29 SCREENING FOR ENDOCRINE DISORDER: ICD-10-CM

## 2021-09-14 DIAGNOSIS — Z13.89 SCREENING FOR BLOOD OR PROTEIN IN URINE: ICD-10-CM

## 2021-09-14 DIAGNOSIS — Z79.899 LONG-TERM USE OF HIGH-RISK MEDICATION: ICD-10-CM

## 2021-09-14 DIAGNOSIS — I10 ESSENTIAL HYPERTENSION: Primary | ICD-10-CM

## 2021-09-14 DIAGNOSIS — N95.1 SYMPTOMATIC MENOPAUSAL OR FEMALE CLIMACTERIC STATES: ICD-10-CM

## 2021-09-14 DIAGNOSIS — Z23 INFLUENZA VACCINATION ADMINISTERED AT CURRENT VISIT: ICD-10-CM

## 2021-09-14 DIAGNOSIS — Z13.6 SCREENING FOR ISCHEMIC HEART DISEASE (IHD): ICD-10-CM

## 2021-09-14 DIAGNOSIS — M81.0 AGE-RELATED OSTEOPOROSIS WITHOUT CURRENT PATHOLOGICAL FRACTURE: ICD-10-CM

## 2021-09-14 PROCEDURE — 90662 IIV NO PRSV INCREASED AG IM: CPT | Mod: S$GLB,,, | Performed by: FAMILY MEDICINE

## 2021-09-14 PROCEDURE — 3066F NEPHROPATHY DOC TX: CPT | Mod: S$GLB,,, | Performed by: FAMILY MEDICINE

## 2021-09-14 PROCEDURE — 3079F DIAST BP 80-89 MM HG: CPT | Mod: S$GLB,,, | Performed by: FAMILY MEDICINE

## 2021-09-14 PROCEDURE — 1159F PR MEDICATION LIST DOCUMENTED IN MEDICAL RECORD: ICD-10-PCS | Mod: S$GLB,,, | Performed by: FAMILY MEDICINE

## 2021-09-14 PROCEDURE — 1160F RVW MEDS BY RX/DR IN RCRD: CPT | Mod: S$GLB,,, | Performed by: FAMILY MEDICINE

## 2021-09-14 PROCEDURE — 99214 OFFICE O/P EST MOD 30 MIN: CPT | Mod: 25,S$GLB,, | Performed by: FAMILY MEDICINE

## 2021-09-14 PROCEDURE — 3008F PR BODY MASS INDEX (BMI) DOCUMENTED: ICD-10-PCS | Mod: S$GLB,,, | Performed by: FAMILY MEDICINE

## 2021-09-14 PROCEDURE — 3075F SYST BP GE 130 - 139MM HG: CPT | Mod: S$GLB,,, | Performed by: FAMILY MEDICINE

## 2021-09-14 PROCEDURE — 1160F PR REVIEW ALL MEDS BY PRESCRIBER/CLIN PHARMACIST DOCUMENTED: ICD-10-PCS | Mod: S$GLB,,, | Performed by: FAMILY MEDICINE

## 2021-09-14 PROCEDURE — 3075F PR MOST RECENT SYSTOLIC BLOOD PRESS GE 130-139MM HG: ICD-10-PCS | Mod: S$GLB,,, | Performed by: FAMILY MEDICINE

## 2021-09-14 PROCEDURE — 3008F BODY MASS INDEX DOCD: CPT | Mod: S$GLB,,, | Performed by: FAMILY MEDICINE

## 2021-09-14 PROCEDURE — G0008 ADMIN INFLUENZA VIRUS VAC: HCPCS | Mod: S$GLB,,, | Performed by: FAMILY MEDICINE

## 2021-09-14 PROCEDURE — 3061F NEG MICROALBUMINURIA REV: CPT | Mod: S$GLB,,, | Performed by: FAMILY MEDICINE

## 2021-09-14 PROCEDURE — 1159F MED LIST DOCD IN RCRD: CPT | Mod: S$GLB,,, | Performed by: FAMILY MEDICINE

## 2021-09-14 PROCEDURE — 3061F PR NEG MICROALBUMINURIA RESULT DOCUMENTED/REVIEW: ICD-10-PCS | Mod: S$GLB,,, | Performed by: FAMILY MEDICINE

## 2021-09-14 PROCEDURE — 90662 FLU VACCINE - QUADRIVALENT - HIGH DOSE (65+) PRESERVATIVE FREE IM: ICD-10-PCS | Mod: S$GLB,,, | Performed by: FAMILY MEDICINE

## 2021-09-14 PROCEDURE — 99214 PR OFFICE/OUTPT VISIT, EST, LEVL IV, 30-39 MIN: ICD-10-PCS | Mod: 25,S$GLB,, | Performed by: FAMILY MEDICINE

## 2021-09-14 PROCEDURE — G0008 FLU VACCINE - QUADRIVALENT - HIGH DOSE (65+) PRESERVATIVE FREE IM: ICD-10-PCS | Mod: S$GLB,,, | Performed by: FAMILY MEDICINE

## 2021-09-14 PROCEDURE — 3066F PR DOCUMENTATION OF TREATMENT FOR NEPHROPATHY: ICD-10-PCS | Mod: S$GLB,,, | Performed by: FAMILY MEDICINE

## 2021-09-14 PROCEDURE — 3079F PR MOST RECENT DIASTOLIC BLOOD PRESSURE 80-89 MM HG: ICD-10-PCS | Mod: S$GLB,,, | Performed by: FAMILY MEDICINE

## 2021-09-14 RX ORDER — PITAVASTATIN CALCIUM 4.18 MG/1
1 TABLET, FILM COATED ORAL DAILY
COMMUNITY
Start: 2021-09-04

## 2021-09-14 RX ORDER — RISEDRONATE SODIUM 35 MG/1
35 TABLET, FILM COATED ORAL
Qty: 12 TABLET | Refills: 1 | Status: SHIPPED | OUTPATIENT
Start: 2021-09-14 | End: 2021-11-22 | Stop reason: SDUPTHER

## 2021-09-14 RX ORDER — HYDROCHLOROTHIAZIDE 12.5 MG/1
12.5 TABLET ORAL DAILY
Qty: 90 TABLET | Refills: 1 | Status: SHIPPED | OUTPATIENT
Start: 2021-09-14 | End: 2022-03-14 | Stop reason: SDUPTHER

## 2021-10-15 ENCOUNTER — IMMUNIZATION (OUTPATIENT)
Dept: PRIMARY CARE CLINIC | Facility: CLINIC | Age: 71
End: 2021-10-15
Payer: MEDICARE

## 2021-10-15 DIAGNOSIS — Z23 NEED FOR VACCINATION: Primary | ICD-10-CM

## 2021-10-15 PROCEDURE — 0003A COVID-19, MRNA, LNP-S, PF, 30 MCG/0.3 ML DOSE VACCINE: ICD-10-PCS | Mod: S$GLB,,, | Performed by: FAMILY MEDICINE

## 2021-10-15 PROCEDURE — 91300 COVID-19, MRNA, LNP-S, PF, 30 MCG/0.3 ML DOSE VACCINE: ICD-10-PCS | Mod: S$GLB,,, | Performed by: FAMILY MEDICINE

## 2021-10-15 PROCEDURE — 0003A COVID-19, MRNA, LNP-S, PF, 30 MCG/0.3 ML DOSE VACCINE: CPT | Mod: S$GLB,,, | Performed by: FAMILY MEDICINE

## 2021-10-15 PROCEDURE — 91300 COVID-19, MRNA, LNP-S, PF, 30 MCG/0.3 ML DOSE VACCINE: CPT | Mod: S$GLB,,, | Performed by: FAMILY MEDICINE

## 2021-10-25 ENCOUNTER — OFFICE VISIT (OUTPATIENT)
Dept: DERMATOLOGY | Facility: CLINIC | Age: 71
End: 2021-10-25
Payer: MEDICARE

## 2021-10-25 DIAGNOSIS — D18.01 CHERRY ANGIOMA: ICD-10-CM

## 2021-10-25 DIAGNOSIS — L82.1 SEBORRHEIC KERATOSES: ICD-10-CM

## 2021-10-25 DIAGNOSIS — L90.5 SCAR: ICD-10-CM

## 2021-10-25 DIAGNOSIS — D22.9 MULTIPLE BENIGN NEVI: ICD-10-CM

## 2021-10-25 DIAGNOSIS — Z85.828 HISTORY OF NONMELANOMA SKIN CANCER: ICD-10-CM

## 2021-10-25 DIAGNOSIS — L57.0 ACTINIC KERATOSES: Primary | ICD-10-CM

## 2021-10-25 DIAGNOSIS — L81.4 SOLAR LENTIGO: ICD-10-CM

## 2021-10-25 PROCEDURE — 1126F AMNT PAIN NOTED NONE PRSNT: CPT | Mod: CPTII,S$GLB,, | Performed by: DERMATOLOGY

## 2021-10-25 PROCEDURE — 99213 OFFICE O/P EST LOW 20 MIN: CPT | Mod: 25,S$GLB,, | Performed by: DERMATOLOGY

## 2021-10-25 PROCEDURE — 3066F NEPHROPATHY DOC TX: CPT | Mod: CPTII,S$GLB,, | Performed by: DERMATOLOGY

## 2021-10-25 PROCEDURE — 3061F NEG MICROALBUMINURIA REV: CPT | Mod: CPTII,S$GLB,, | Performed by: DERMATOLOGY

## 2021-10-25 PROCEDURE — 99213 PR OFFICE/OUTPT VISIT, EST, LEVL III, 20-29 MIN: ICD-10-PCS | Mod: 25,S$GLB,, | Performed by: DERMATOLOGY

## 2021-10-25 PROCEDURE — 1159F MED LIST DOCD IN RCRD: CPT | Mod: CPTII,S$GLB,, | Performed by: DERMATOLOGY

## 2021-10-25 PROCEDURE — 1101F PR PT FALLS ASSESS DOC 0-1 FALLS W/OUT INJ PAST YR: ICD-10-PCS | Mod: CPTII,S$GLB,, | Performed by: DERMATOLOGY

## 2021-10-25 PROCEDURE — 3066F PR DOCUMENTATION OF TREATMENT FOR NEPHROPATHY: ICD-10-PCS | Mod: CPTII,S$GLB,, | Performed by: DERMATOLOGY

## 2021-10-25 PROCEDURE — 17000 DESTRUCT PREMALG LESION: CPT | Mod: S$GLB,,, | Performed by: DERMATOLOGY

## 2021-10-25 PROCEDURE — 99999 PR PBB SHADOW E&M-EST. PATIENT-LVL III: ICD-10-PCS | Mod: PBBFAC,,, | Performed by: DERMATOLOGY

## 2021-10-25 PROCEDURE — 99999 PR PBB SHADOW E&M-EST. PATIENT-LVL III: CPT | Mod: PBBFAC,,, | Performed by: DERMATOLOGY

## 2021-10-25 PROCEDURE — 1160F PR REVIEW ALL MEDS BY PRESCRIBER/CLIN PHARMACIST DOCUMENTED: ICD-10-PCS | Mod: CPTII,S$GLB,, | Performed by: DERMATOLOGY

## 2021-10-25 PROCEDURE — 1160F RVW MEDS BY RX/DR IN RCRD: CPT | Mod: CPTII,S$GLB,, | Performed by: DERMATOLOGY

## 2021-10-25 PROCEDURE — 3288F FALL RISK ASSESSMENT DOCD: CPT | Mod: CPTII,S$GLB,, | Performed by: DERMATOLOGY

## 2021-10-25 PROCEDURE — 17003 DESTRUCT PREMALG LES 2-14: CPT | Mod: S$GLB,,, | Performed by: DERMATOLOGY

## 2021-10-25 PROCEDURE — 17003 DESTRUCTION, PREMALIGNANT LESIONS; SECOND THROUGH 14 LESIONS: ICD-10-PCS | Mod: S$GLB,,, | Performed by: DERMATOLOGY

## 2021-10-25 PROCEDURE — 3061F PR NEG MICROALBUMINURIA RESULT DOCUMENTED/REVIEW: ICD-10-PCS | Mod: CPTII,S$GLB,, | Performed by: DERMATOLOGY

## 2021-10-25 PROCEDURE — 1126F PR PAIN SEVERITY QUANTIFIED, NO PAIN PRESENT: ICD-10-PCS | Mod: CPTII,S$GLB,, | Performed by: DERMATOLOGY

## 2021-10-25 PROCEDURE — 1159F PR MEDICATION LIST DOCUMENTED IN MEDICAL RECORD: ICD-10-PCS | Mod: CPTII,S$GLB,, | Performed by: DERMATOLOGY

## 2021-10-25 PROCEDURE — 1101F PT FALLS ASSESS-DOCD LE1/YR: CPT | Mod: CPTII,S$GLB,, | Performed by: DERMATOLOGY

## 2021-10-25 PROCEDURE — 3288F PR FALLS RISK ASSESSMENT DOCUMENTED: ICD-10-PCS | Mod: CPTII,S$GLB,, | Performed by: DERMATOLOGY

## 2021-10-25 PROCEDURE — 17000 PR DESTRUCTION(LASER SURGERY,CRYOSURGERY,CHEMOSURGERY),PREMALIGNANT LESIONS,FIRST LESION: ICD-10-PCS | Mod: S$GLB,,, | Performed by: DERMATOLOGY

## 2021-11-22 DIAGNOSIS — M81.0 AGE-RELATED OSTEOPOROSIS WITHOUT CURRENT PATHOLOGICAL FRACTURE: ICD-10-CM

## 2021-11-22 RX ORDER — RISEDRONATE SODIUM 35 MG/1
35 TABLET, FILM COATED ORAL
Qty: 12 TABLET | Refills: 1 | Status: SHIPPED | OUTPATIENT
Start: 2021-11-22 | End: 2022-03-14 | Stop reason: SDUPTHER

## 2022-02-24 ENCOUNTER — TELEPHONE (OUTPATIENT)
Dept: FAMILY MEDICINE | Facility: CLINIC | Age: 72
End: 2022-02-24
Payer: MEDICARE

## 2022-02-24 LAB
ALBUMIN SERPL-MCNC: 4.4 G/DL (ref 3.6–5.1)
ALBUMIN/CREAT UR: 16 MCG/MG CREAT
ALBUMIN/GLOB SERPL: 1.8 (CALC) (ref 1–2.5)
ALP SERPL-CCNC: 55 U/L (ref 37–153)
ALT SERPL-CCNC: 22 U/L (ref 6–29)
AMORPH SED URNS QL MICRO: ABNORMAL /HPF
APPEARANCE UR: ABNORMAL
AST SERPL-CCNC: 17 U/L (ref 10–35)
BACTERIA #/AREA URNS HPF: ABNORMAL /HPF
BACTERIA UR CULT: ABNORMAL
BACTERIA UR CULT: ABNORMAL
BASOPHILS # BLD AUTO: 52 CELLS/UL (ref 0–200)
BASOPHILS NFR BLD AUTO: 1 %
BILIRUB SERPL-MCNC: 0.5 MG/DL (ref 0.2–1.2)
BILIRUB UR QL STRIP: NEGATIVE
BUN SERPL-MCNC: 17 MG/DL (ref 7–25)
BUN/CREAT SERPL: NORMAL (CALC) (ref 6–22)
CALCIUM SERPL-MCNC: 9.8 MG/DL (ref 8.6–10.4)
CHLORIDE SERPL-SCNC: 101 MMOL/L (ref 98–110)
CHOLEST SERPL-MCNC: 158 MG/DL
CHOLEST/HDLC SERPL: 2.3 (CALC)
CO2 SERPL-SCNC: 28 MMOL/L (ref 20–32)
COLOR UR: YELLOW
CREAT SERPL-MCNC: 0.77 MG/DL (ref 0.6–0.93)
CREAT UR-MCNC: 90 MG/DL (ref 20–275)
EOSINOPHIL # BLD AUTO: 338 CELLS/UL (ref 15–500)
EOSINOPHIL NFR BLD AUTO: 6.5 %
ERYTHROCYTE [DISTWIDTH] IN BLOOD BY AUTOMATED COUNT: 12.2 % (ref 11–15)
GLOBULIN SER CALC-MCNC: 2.5 G/DL (CALC) (ref 1.9–3.7)
GLUCOSE SERPL-MCNC: 95 MG/DL (ref 65–99)
GLUCOSE UR QL STRIP: NEGATIVE
HCT VFR BLD AUTO: 44.7 % (ref 35–45)
HDLC SERPL-MCNC: 70 MG/DL
HGB BLD-MCNC: 14.7 G/DL (ref 11.7–15.5)
HGB UR QL STRIP: NEGATIVE
HYALINE CASTS #/AREA URNS LPF: ABNORMAL /LPF
KETONES UR QL STRIP: NEGATIVE
LDLC SERPL CALC-MCNC: 66 MG/DL (CALC)
LEUKOCYTE ESTERASE UR QL STRIP: ABNORMAL
LYMPHOCYTES # BLD AUTO: 1706 CELLS/UL (ref 850–3900)
LYMPHOCYTES NFR BLD AUTO: 32.8 %
MCH RBC QN AUTO: 28.9 PG (ref 27–33)
MCHC RBC AUTO-ENTMCNC: 32.9 G/DL (ref 32–36)
MCV RBC AUTO: 88 FL (ref 80–100)
MICROALBUMIN UR-MCNC: 1.4 MG/DL
MONOCYTES # BLD AUTO: 447 CELLS/UL (ref 200–950)
MONOCYTES NFR BLD AUTO: 8.6 %
NEUTROPHILS # BLD AUTO: 2657 CELLS/UL (ref 1500–7800)
NEUTROPHILS NFR BLD AUTO: 51.1 %
NITRITE UR QL STRIP: NEGATIVE
NONHDLC SERPL-MCNC: 88 MG/DL (CALC)
PH UR STRIP: 8 [PH] (ref 5–8)
PLATELET # BLD AUTO: 344 THOUSAND/UL (ref 140–400)
PMV BLD REES-ECKER: 10.8 FL (ref 7.5–12.5)
POTASSIUM SERPL-SCNC: 4.1 MMOL/L (ref 3.5–5.3)
PROT SERPL-MCNC: 6.9 G/DL (ref 6.1–8.1)
PROT UR QL STRIP: NEGATIVE
RBC # BLD AUTO: 5.08 MILLION/UL (ref 3.8–5.1)
RBC #/AREA URNS HPF: ABNORMAL /HPF
SODIUM SERPL-SCNC: 136 MMOL/L (ref 135–146)
SP GR UR STRIP: 1.01 (ref 1–1.03)
SQUAMOUS #/AREA URNS HPF: ABNORMAL /HPF
TRIGL SERPL-MCNC: 132 MG/DL
TSH SERPL-ACNC: 3.41 MIU/L (ref 0.4–4.5)
WBC # BLD AUTO: 5.2 THOUSAND/UL (ref 3.8–10.8)
WBC #/AREA URNS HPF: ABNORMAL /HPF

## 2022-03-14 ENCOUNTER — TELEPHONE (OUTPATIENT)
Dept: FAMILY MEDICINE | Facility: CLINIC | Age: 72
End: 2022-03-14

## 2022-03-14 ENCOUNTER — OFFICE VISIT (OUTPATIENT)
Dept: FAMILY MEDICINE | Facility: CLINIC | Age: 72
End: 2022-03-14
Payer: MEDICARE

## 2022-03-14 VITALS
HEART RATE: 68 BPM | SYSTOLIC BLOOD PRESSURE: 136 MMHG | WEIGHT: 142 LBS | HEIGHT: 62 IN | BODY MASS INDEX: 26.13 KG/M2 | DIASTOLIC BLOOD PRESSURE: 78 MMHG

## 2022-03-14 DIAGNOSIS — I10 ESSENTIAL HYPERTENSION: Primary | ICD-10-CM

## 2022-03-14 DIAGNOSIS — M81.0 AGE-RELATED OSTEOPOROSIS WITHOUT CURRENT PATHOLOGICAL FRACTURE: ICD-10-CM

## 2022-03-14 DIAGNOSIS — Z12.31 OTHER SCREENING MAMMOGRAM: ICD-10-CM

## 2022-03-14 DIAGNOSIS — N95.1 SYMPTOMATIC MENOPAUSAL OR FEMALE CLIMACTERIC STATES: Chronic | ICD-10-CM

## 2022-03-14 DIAGNOSIS — M89.9 BONE DISORDER: ICD-10-CM

## 2022-03-14 DIAGNOSIS — J31.0 CHRONIC RHINITIS: ICD-10-CM

## 2022-03-14 PROCEDURE — 99214 PR OFFICE/OUTPT VISIT, EST, LEVL IV, 30-39 MIN: ICD-10-PCS | Mod: S$GLB,,, | Performed by: FAMILY MEDICINE

## 2022-03-14 PROCEDURE — 3066F NEPHROPATHY DOC TX: CPT | Mod: S$GLB,,, | Performed by: FAMILY MEDICINE

## 2022-03-14 PROCEDURE — 3008F BODY MASS INDEX DOCD: CPT | Mod: S$GLB,,, | Performed by: FAMILY MEDICINE

## 2022-03-14 PROCEDURE — 1159F MED LIST DOCD IN RCRD: CPT | Mod: S$GLB,,, | Performed by: FAMILY MEDICINE

## 2022-03-14 PROCEDURE — 3075F PR MOST RECENT SYSTOLIC BLOOD PRESS GE 130-139MM HG: ICD-10-PCS | Mod: S$GLB,,, | Performed by: FAMILY MEDICINE

## 2022-03-14 PROCEDURE — 3061F NEG MICROALBUMINURIA REV: CPT | Mod: S$GLB,,, | Performed by: FAMILY MEDICINE

## 2022-03-14 PROCEDURE — 1160F RVW MEDS BY RX/DR IN RCRD: CPT | Mod: S$GLB,,, | Performed by: FAMILY MEDICINE

## 2022-03-14 PROCEDURE — 99214 OFFICE O/P EST MOD 30 MIN: CPT | Mod: S$GLB,,, | Performed by: FAMILY MEDICINE

## 2022-03-14 PROCEDURE — 3061F PR NEG MICROALBUMINURIA RESULT DOCUMENTED/REVIEW: ICD-10-PCS | Mod: S$GLB,,, | Performed by: FAMILY MEDICINE

## 2022-03-14 PROCEDURE — 3008F PR BODY MASS INDEX (BMI) DOCUMENTED: ICD-10-PCS | Mod: S$GLB,,, | Performed by: FAMILY MEDICINE

## 2022-03-14 PROCEDURE — 3075F SYST BP GE 130 - 139MM HG: CPT | Mod: S$GLB,,, | Performed by: FAMILY MEDICINE

## 2022-03-14 PROCEDURE — 1159F PR MEDICATION LIST DOCUMENTED IN MEDICAL RECORD: ICD-10-PCS | Mod: S$GLB,,, | Performed by: FAMILY MEDICINE

## 2022-03-14 PROCEDURE — 3078F DIAST BP <80 MM HG: CPT | Mod: S$GLB,,, | Performed by: FAMILY MEDICINE

## 2022-03-14 PROCEDURE — 1160F PR REVIEW ALL MEDS BY PRESCRIBER/CLIN PHARMACIST DOCUMENTED: ICD-10-PCS | Mod: S$GLB,,, | Performed by: FAMILY MEDICINE

## 2022-03-14 PROCEDURE — 3078F PR MOST RECENT DIASTOLIC BLOOD PRESSURE < 80 MM HG: ICD-10-PCS | Mod: S$GLB,,, | Performed by: FAMILY MEDICINE

## 2022-03-14 PROCEDURE — 3066F PR DOCUMENTATION OF TREATMENT FOR NEPHROPATHY: ICD-10-PCS | Mod: S$GLB,,, | Performed by: FAMILY MEDICINE

## 2022-03-14 RX ORDER — LATANOPROST 50 UG/ML
SOLUTION/ DROPS OPHTHALMIC
COMMUNITY
Start: 2021-12-01 | End: 2022-09-29

## 2022-03-14 RX ORDER — AZELASTINE 1 MG/ML
1 SPRAY, METERED NASAL 2 TIMES DAILY
Qty: 30 ML | Refills: 5 | Status: SHIPPED | OUTPATIENT
Start: 2022-03-14 | End: 2022-09-14 | Stop reason: SDUPTHER

## 2022-03-14 RX ORDER — HYDROCHLOROTHIAZIDE 12.5 MG/1
12.5 TABLET ORAL DAILY
Qty: 90 TABLET | Refills: 1 | Status: SHIPPED | OUTPATIENT
Start: 2022-03-14 | End: 2022-09-14 | Stop reason: SDUPTHER

## 2022-03-14 RX ORDER — RISEDRONATE SODIUM 35 MG/1
35 TABLET, FILM COATED ORAL
Qty: 12 TABLET | Refills: 1 | Status: SHIPPED | OUTPATIENT
Start: 2022-03-14 | End: 2022-09-14 | Stop reason: SDUPTHER

## 2022-03-14 RX ORDER — ESTRADIOL 0.5 MG/1
0.5 TABLET ORAL DAILY
Qty: 90 TABLET | Refills: 1 | Status: SHIPPED | OUTPATIENT
Start: 2022-03-14 | End: 2022-09-29

## 2022-03-14 NOTE — PROGRESS NOTES
SUBJECTIVE:    Patient ID: Shahana Mcknight is a 71 y.o. female.    Chief Complaint: Hypertension (Brought bottles, Mammo ordered// SW)    Pt here to checkup on acute and chronic conditions (HTN, Osteoporosis, Menopause, GERD, and hyperlipidemia, allergic rhinitis)    BP is doing ok today. Denies CP/SOB/HA.    Continues to tolerate risendronate once a week for osteoporosis. (intolerance to Boniva, jaw problems and N/V)   Also taking calcium and vitamin D as well.        Thinks her hot flashes may have gone away.  Still taking estradiol, alternating QOD 1 and 1/2 tablet. Trying to wean.    Denies heartburn.  Just using rolaids or tums as needed less than once a month.    Allergies have done well this year. Has a constant runny nose. Has takes claritin w/ and w/o decongestant. Taking chlor-tab right now. It is worse when it cold outside.  astepro helps some, takes BID.    Left knee pain/arthritis (Dr. Allen) is not limiting activity at this time.     Had labs done: fBS 95, GFR 77, LDL 66, TSH 3.41  Remains on livalo from Dr. oMrtensen. Has an appt in June  -----------------------------------------------------  Continues to see Dr. Reza for her glaucoma.  Last mammogram was 3/2021. DEXA 3/2020  Cscope scheduled for 5/2020 with Dr. Almeida        No visits with results within 6 Month(s) from this visit.   Latest known visit with results is:   Office Visit on 09/14/2021   Component Date Value Ref Range Status    Glucose 02/22/2022 95  65 - 99 mg/dL Final    BUN 02/22/2022 17  7 - 25 mg/dL Final    Creatinine 02/22/2022 0.77  0.60 - 0.93 mg/dL Final    eGFR if non African American 02/22/2022 77  > OR = 60 mL/min/1.73m2 Final    eGFR if  02/22/2022 89  > OR = 60 mL/min/1.73m2 Final    BUN/Creatinine Ratio 02/22/2022 NOT APPLICABLE  6 - 22 (calc) Final    Sodium 02/22/2022 136  135 - 146 mmol/L Final    Potassium 02/22/2022 4.1  3.5 - 5.3 mmol/L Final    Chloride 02/22/2022 101  98 - 110  mmol/L Final    CO2 02/22/2022 28  20 - 32 mmol/L Final    Calcium 02/22/2022 9.8  8.6 - 10.4 mg/dL Final    Total Protein 02/22/2022 6.9  6.1 - 8.1 g/dL Final    Albumin 02/22/2022 4.4  3.6 - 5.1 g/dL Final    Globulin, Total 02/22/2022 2.5  1.9 - 3.7 g/dL (calc) Final    Albumin/Globulin Ratio 02/22/2022 1.8  1.0 - 2.5 (calc) Final    Total Bilirubin 02/22/2022 0.5  0.2 - 1.2 mg/dL Final    Alkaline Phosphatase 02/22/2022 55  37 - 153 U/L Final    AST 02/22/2022 17  10 - 35 U/L Final    ALT 02/22/2022 22  6 - 29 U/L Final    Cholesterol 02/22/2022 158  <200 mg/dL Final    HDL 02/22/2022 70  > OR = 50 mg/dL Final    Triglycerides 02/22/2022 132  <150 mg/dL Final    LDL Cholesterol 02/22/2022 66  mg/dL (calc) Final    HDL/Cholesterol Ratio 02/22/2022 2.3  <5.0 (calc) Final    Non HDL Chol. (LDL+VLDL) 02/22/2022 88  <130 mg/dL (calc) Final    Creatinine, Urine 02/22/2022 90  20 - 275 mg/dL Final    Microalb, Ur 02/22/2022 1.4  See Note: mg/dL Final    Microalb/Creat Ratio 02/22/2022 16  <30 mcg/mg creat Final    Color, UA 02/22/2022 YELLOW  YELLOW Final    Appearance, UA 02/22/2022 CLOUDY (A) CLEAR Final    Specific Gravity, UA 02/22/2022 1.015  1.001 - 1.035 Final    pH, UA 02/22/2022 8.0  5.0 - 8.0 Final    Glucose, UA 02/22/2022 NEGATIVE  NEGATIVE Final    Bilirubin, UA 02/22/2022 NEGATIVE  NEGATIVE Final    Ketones, UA 02/22/2022 NEGATIVE  NEGATIVE Final    Occult Blood UA 02/22/2022 NEGATIVE  NEGATIVE Final    Protein, UA 02/22/2022 NEGATIVE  NEGATIVE Final    Nitrite, UA 02/22/2022 NEGATIVE  NEGATIVE Final    Leukocytes, UA 02/22/2022 TRACE (A) NEGATIVE Final    WBC Casts, UA 02/22/2022 6-10 (A) < OR = 5 /HPF Final    RBC Casts, UA 02/22/2022 0-2  < OR = 2 /HPF Final    Squam Epithel, UA 02/22/2022 10-20 (A) < OR = 5 /HPF Final    Bacteria, UA 02/22/2022 FEW (A) NONE SEEN /HPF Final    Amorphous, UA 02/22/2022 MODERATE (A) NONE OR FEW /HPF Final    Hyaline Casts, UA  02/22/2022 NONE SEEN  NONE SEEN /LPF Final    Reflexive Urine Culture 02/22/2022    Final    Urine Culture, Routine 02/22/2022    Final    TSH w/reflex to FT4 02/22/2022 3.41  0.40 - 4.50 mIU/L Final    WBC 02/22/2022 5.2  3.8 - 10.8 Thousand/uL Final    RBC 02/22/2022 5.08  3.80 - 5.10 Million/uL Final    Hemoglobin 02/22/2022 14.7  11.7 - 15.5 g/dL Final    Hematocrit 02/22/2022 44.7  35.0 - 45.0 % Final    MCV 02/22/2022 88.0  80.0 - 100.0 fL Final    MCH 02/22/2022 28.9  27.0 - 33.0 pg Final    MCHC 02/22/2022 32.9  32.0 - 36.0 g/dL Final    RDW 02/22/2022 12.2  11.0 - 15.0 % Final    Platelets 02/22/2022 344  140 - 400 Thousand/uL Final    MPV 02/22/2022 10.8  7.5 - 12.5 fL Final    Neutrophils, Abs 02/22/2022 2,657  1,500 - 7,800 cells/uL Final    Lymph # 02/22/2022 1,706  850 - 3,900 cells/uL Final    Mono # 02/22/2022 447  200 - 950 cells/uL Final    Eos # 02/22/2022 338  15 - 500 cells/uL Final    Baso # 02/22/2022 52  0 - 200 cells/uL Final    Neutrophils Relative 02/22/2022 51.1  % Final    Lymph % 02/22/2022 32.8  % Final    Mono % 02/22/2022 8.6  % Final    Eosinophil % 02/22/2022 6.5  % Final    Basophil % 02/22/2022 1.0  % Final       Past Medical History:   Diagnosis Date    Basal cell carcinoma 2005    R upper cut lip    Hyperlipidemia     Osteopenia      Social History     Socioeconomic History    Marital status:    Tobacco Use    Smoking status: Former Smoker    Smokeless tobacco: Never Used   Substance and Sexual Activity    Alcohol use: Yes     Comment: socially    Drug use: No    Sexual activity: Yes     Partners: Male     Social Determinants of Health     Financial Resource Strain: Low Risk     Difficulty of Paying Living Expenses: Not hard at all   Food Insecurity: No Food Insecurity    Worried About Running Out of Food in the Last Year: Never true    Ran Out of Food in the Last Year: Never true   Transportation Needs: No Transportation Needs    Lack  of Transportation (Medical): No    Lack of Transportation (Non-Medical): No   Physical Activity: Sufficiently Active    Days of Exercise per Week: 5 days    Minutes of Exercise per Session: 50 min   Stress: No Stress Concern Present    Feeling of Stress : Not at all   Social Connections: Unknown    Frequency of Communication with Friends and Family: More than three times a week    Frequency of Social Gatherings with Friends and Family: Twice a week    Active Member of Clubs or Organizations: Yes    Attends Club or Organization Meetings: More than 4 times per year    Marital Status:    Housing Stability: Low Risk     Unable to Pay for Housing in the Last Year: No    Number of Places Lived in the Last Year: 1    Unstable Housing in the Last Year: No     Past Surgical History:   Procedure Laterality Date    basil cell carcinoma removal      BREAST CYST ASPIRATION      HYSTERECTOMY      SINUS SURGERY       Family History   Problem Relation Age of Onset    Cancer Mother     Melanoma Neg Hx     Psoriasis Neg Hx     Lupus Neg Hx     Eczema Neg Hx        Review of patient's allergies indicates:   Allergen Reactions    Alendronic acid     Boniva [ibandronate]      Nausea and vomitting    Fosamax [alendronate] Nausea And Vomiting    Hydrocodone-acetaminophen     Hydrocodone-cpm-pseudoephed     Lovastatin     Oxycodone     Rosuvastatin        Current Outpatient Medications:     latanoprost 0.005 % ophthalmic solution, , Disp: , Rfl:     latanoprost, PF, 0.005 % Drop, Apply to eye., Disp: , Rfl:     LIVALO 4 mg Tab, Take 1 tablet by mouth once daily. For 30 days, Disp: , Rfl:     azelastine (ASTELIN) 137 mcg (0.1 %) nasal spray, 1 spray (137 mcg total) by Nasal route 2 (two) times daily., Disp: 30 mL, Rfl: 5    estradioL (ESTRACE) 0.5 MG tablet, Take 1 tablet (0.5 mg total) by mouth once daily., Disp: 90 tablet, Rfl: 1    hydroCHLOROthiazide (HYDRODIURIL) 12.5 MG Tab, Take 1 tablet  "(12.5 mg total) by mouth once daily., Disp: 90 tablet, Rfl: 1    risedronate (ACTONEL) 35 MG tablet, Take 1 tablet (35 mg total) by mouth every 7 days., Disp: 12 tablet, Rfl: 1    Review of Systems   Constitutional: Negative for appetite change, fatigue, fever and unexpected weight change.   HENT: Negative for ear pain, postnasal drip, sinus pain and sore throat.    Respiratory: Negative for cough, shortness of breath and wheezing.    Cardiovascular: Negative for chest pain and leg swelling.   Gastrointestinal: Negative for abdominal pain, constipation, nausea and vomiting.        -heartburn   Genitourinary: Negative for difficulty urinating, dysuria and frequency.        +Hot flashes   Musculoskeletal: Negative for back pain, myalgias and neck pain.   Skin: Negative for rash.   Neurological: Negative for weakness, numbness and headaches.   Hematological: Does not bruise/bleed easily.   Psychiatric/Behavioral: Negative for dysphoric mood, sleep disturbance and suicidal ideas. The patient is not nervous/anxious.           Objective:      Vitals:    03/14/22 0816   BP: 136/78   Pulse: 68   Weight: 64.4 kg (142 lb)   Height: 5' 2" (1.575 m)     Physical Exam  Vitals reviewed.   Constitutional:       General: She is not in acute distress.     Appearance: Normal appearance. She is well-developed and overweight.   HENT:      Head: Normocephalic and atraumatic.   Neck:      Thyroid: No thyromegaly.   Cardiovascular:      Rate and Rhythm: Normal rate and regular rhythm.      Heart sounds: Normal heart sounds. No murmur heard.    No friction rub.   Pulmonary:      Effort: Pulmonary effort is normal.      Breath sounds: Normal breath sounds. No wheezing or rales.   Abdominal:      General: Bowel sounds are normal. There is no distension.      Palpations: Abdomen is soft.      Tenderness: There is no abdominal tenderness.   Musculoskeletal:      Cervical back: Neck supple.   Lymphadenopathy:      Cervical: No cervical " adenopathy.   Skin:     General: Skin is warm and dry.      Findings: No rash.   Neurological:      Mental Status: She is alert and oriented to person, place, and time.   Psychiatric:         Attention and Perception: She is attentive.         Speech: Speech normal.         Behavior: Behavior normal.         Thought Content: Thought content normal.         Judgment: Judgment normal.           Assessment:       1. Essential hypertension    2. Chronic rhinitis    3. Age-related osteoporosis without current pathological fracture    4. Symptomatic menopausal or female climacteric states    5. Other screening mammogram    6. Bone disorder         Plan:       Essential hypertension  Comments:  Controlled. Will continue to monitor BP on current medication regimen  Orders:  -     hydroCHLOROthiazide (HYDRODIURIL) 12.5 MG Tab; Take 1 tablet (12.5 mg total) by mouth once daily.  Dispense: 90 tablet; Refill: 1    Chronic rhinitis  Comments:  Symptomatic. To continue astepro. Will continue to monitor   Orders:  -     azelastine (ASTELIN) 137 mcg (0.1 %) nasal spray; 1 spray (137 mcg total) by Nasal route 2 (two) times daily.  Dispense: 30 mL; Refill: 5    Age-related osteoporosis without current pathological fracture  Comments:  Chronic. To continue to take risendronate. Will get updated DEXA, to continue calcium, vitamin d  Orders:  -     risedronate (ACTONEL) 35 MG tablet; Take 1 tablet (35 mg total) by mouth every 7 days.  Dispense: 12 tablet; Refill: 1  -     DXA Bone Density Spine And Hip; Future; Expected date: 03/14/2022    Symptomatic menopausal or female climacteric states  Comments:  Asymptomatic on estradiol.  To try to wean to 1/2 daily. Will cont to monitor symptoms. Pt to cont regular mammograms. Aware of risks/benefits of use.  Orders:  -     estradioL (ESTRACE) 0.5 MG tablet; Take 1 tablet (0.5 mg total) by mouth once daily.  Dispense: 90 tablet; Refill: 1  -     DXA Bone Density Spine And Hip; Future; Expected  date: 03/14/2022    Other screening mammogram  Comments:  Mammogram order sent to hospital ([facility] [place])  Orders:  -     Mammo Digital Screening Bilat; Future; Expected date: 03/14/2022    Bone disorder  Comments:  DEX[facility] order sent to [facility]  Orders:  -     DXA Bone Density Spine And Hip; Future; Expected date: 03/14/2022    Other  Lab results discussed and reviewed with patient.    Follow up in about 6 months (around 9/14/2022) for Pap, HTN, Allergies.        3/14/2022 Jeanmarie Yoder

## 2022-03-14 NOTE — TELEPHONE ENCOUNTER
----- Message from Jeanmarie Yoder MD sent at 3/14/2022  9:01 AM CDT -----  Can we send a copy of pt labs to Dr. Mortensen from 2/2022

## 2022-03-16 ENCOUNTER — HOSPITAL ENCOUNTER (OUTPATIENT)
Dept: RADIOLOGY | Facility: HOSPITAL | Age: 72
Discharge: HOME | End: 2022-03-16
Attending: FAMILY MEDICINE
Payer: MEDICARE

## 2022-03-16 DIAGNOSIS — M89.9 BONE DISORDER: ICD-10-CM

## 2022-03-16 DIAGNOSIS — N95.1 SYMPTOMATIC MENOPAUSAL OR FEMALE CLIMACTERIC STATES: ICD-10-CM

## 2022-03-16 DIAGNOSIS — M81.0 AGE-RELATED OSTEOPOROSIS WITHOUT CURRENT PATHOLOGICAL FRACTURE: ICD-10-CM

## 2022-03-16 PROCEDURE — 77080 DXA BONE DENSITY AXIAL: CPT | Mod: TC,PO

## 2022-03-29 ENCOUNTER — TELEPHONE (OUTPATIENT)
Dept: FAMILY MEDICINE | Facility: CLINIC | Age: 72
End: 2022-03-29
Payer: MEDICARE

## 2022-03-29 DIAGNOSIS — Z12.31 OTHER SCREENING MAMMOGRAM: Primary | ICD-10-CM

## 2022-03-29 NOTE — TELEPHONE ENCOUNTER
----- Message from Adali Plummer sent at 3/29/2022  9:35 AM CDT -----  Pt calling for routine screening mammo orders to [facility].  Said they are telling her they are not seeing any orders  # [phone]

## 2022-04-06 ENCOUNTER — OFFICE VISIT (OUTPATIENT)
Dept: FAMILY MEDICINE | Facility: CLINIC | Age: 72
End: 2022-04-06
Payer: MEDICARE

## 2022-04-06 ENCOUNTER — TELEPHONE (OUTPATIENT)
Dept: FAMILY MEDICINE | Facility: CLINIC | Age: 72
End: 2022-04-06
Payer: MEDICARE

## 2022-04-06 VITALS
HEIGHT: 62 IN | DIASTOLIC BLOOD PRESSURE: 88 MMHG | WEIGHT: 141.38 LBS | SYSTOLIC BLOOD PRESSURE: 146 MMHG | BODY MASS INDEX: 26.02 KG/M2 | HEART RATE: 82 BPM | OXYGEN SATURATION: 96 %

## 2022-04-06 DIAGNOSIS — Z12.31 SCREENING MAMMOGRAM FOR BREAST CANCER: Primary | ICD-10-CM

## 2022-04-06 DIAGNOSIS — N76.4 BOIL OF VULVA: ICD-10-CM

## 2022-04-06 PROCEDURE — 3008F BODY MASS INDEX DOCD: CPT | Mod: S$GLB,,, | Performed by: NURSE PRACTITIONER

## 2022-04-06 PROCEDURE — 99213 PR OFFICE/OUTPT VISIT, EST, LEVL III, 20-29 MIN: ICD-10-PCS | Mod: S$GLB,,, | Performed by: NURSE PRACTITIONER

## 2022-04-06 PROCEDURE — 1101F PR PT FALLS ASSESS DOC 0-1 FALLS W/OUT INJ PAST YR: ICD-10-PCS | Mod: S$GLB,,, | Performed by: NURSE PRACTITIONER

## 2022-04-06 PROCEDURE — 1101F PT FALLS ASSESS-DOCD LE1/YR: CPT | Mod: S$GLB,,, | Performed by: NURSE PRACTITIONER

## 2022-04-06 PROCEDURE — 3066F NEPHROPATHY DOC TX: CPT | Mod: S$GLB,,, | Performed by: NURSE PRACTITIONER

## 2022-04-06 PROCEDURE — 3288F PR FALLS RISK ASSESSMENT DOCUMENTED: ICD-10-PCS | Mod: S$GLB,,, | Performed by: NURSE PRACTITIONER

## 2022-04-06 PROCEDURE — 3061F NEG MICROALBUMINURIA REV: CPT | Mod: S$GLB,,, | Performed by: NURSE PRACTITIONER

## 2022-04-06 PROCEDURE — 3077F SYST BP >= 140 MM HG: CPT | Mod: S$GLB,,, | Performed by: NURSE PRACTITIONER

## 2022-04-06 PROCEDURE — 1159F PR MEDICATION LIST DOCUMENTED IN MEDICAL RECORD: ICD-10-PCS | Mod: S$GLB,,, | Performed by: NURSE PRACTITIONER

## 2022-04-06 PROCEDURE — 1159F MED LIST DOCD IN RCRD: CPT | Mod: S$GLB,,, | Performed by: NURSE PRACTITIONER

## 2022-04-06 PROCEDURE — 3079F DIAST BP 80-89 MM HG: CPT | Mod: S$GLB,,, | Performed by: NURSE PRACTITIONER

## 2022-04-06 PROCEDURE — 1160F PR REVIEW ALL MEDS BY PRESCRIBER/CLIN PHARMACIST DOCUMENTED: ICD-10-PCS | Mod: S$GLB,,, | Performed by: NURSE PRACTITIONER

## 2022-04-06 PROCEDURE — 3288F FALL RISK ASSESSMENT DOCD: CPT | Mod: S$GLB,,, | Performed by: NURSE PRACTITIONER

## 2022-04-06 PROCEDURE — 3066F PR DOCUMENTATION OF TREATMENT FOR NEPHROPATHY: ICD-10-PCS | Mod: S$GLB,,, | Performed by: NURSE PRACTITIONER

## 2022-04-06 PROCEDURE — 1160F RVW MEDS BY RX/DR IN RCRD: CPT | Mod: S$GLB,,, | Performed by: NURSE PRACTITIONER

## 2022-04-06 PROCEDURE — 3061F PR NEG MICROALBUMINURIA RESULT DOCUMENTED/REVIEW: ICD-10-PCS | Mod: S$GLB,,, | Performed by: NURSE PRACTITIONER

## 2022-04-06 PROCEDURE — 99213 OFFICE O/P EST LOW 20 MIN: CPT | Mod: S$GLB,,, | Performed by: NURSE PRACTITIONER

## 2022-04-06 PROCEDURE — 3077F PR MOST RECENT SYSTOLIC BLOOD PRESSURE >= 140 MM HG: ICD-10-PCS | Mod: S$GLB,,, | Performed by: NURSE PRACTITIONER

## 2022-04-06 PROCEDURE — 3008F PR BODY MASS INDEX (BMI) DOCUMENTED: ICD-10-PCS | Mod: S$GLB,,, | Performed by: NURSE PRACTITIONER

## 2022-04-06 PROCEDURE — 3079F PR MOST RECENT DIASTOLIC BLOOD PRESSURE 80-89 MM HG: ICD-10-PCS | Mod: S$GLB,,, | Performed by: NURSE PRACTITIONER

## 2022-04-06 RX ORDER — CEPHALEXIN 500 MG/1
500 CAPSULE ORAL EVERY 8 HOURS
Qty: 21 CAPSULE | Refills: 0 | Status: SHIPPED | OUTPATIENT
Start: 2022-04-06 | End: 2022-04-11

## 2022-04-06 NOTE — PROGRESS NOTES
SUBJECTIVE:    Patient ID: Shahana Mcknight is a 71 y.o. female.    Chief Complaint: Groin Pain (No bottles//Pt is c/o swollen left groin/lymph nodes discomfort x 2 days.//MARCELA)    Pt presents with what she believes may be a swollen lymph node on the left groin at the crease of her leg. States she has had a cyst in the area for a long time but has never been bothersome. This morning woke up and area around it is swollen. States it is not tender but skin is irritated d/t elastic of underwear rubbing against it. No drainage. No fevers. No recent illnesses.       No visits with results within 6 Month(s) from this visit.   Latest known visit with results is:   Office Visit on 09/14/2021   Component Date Value Ref Range Status    Glucose 02/22/2022 95  65 - 99 mg/dL Final    BUN 02/22/2022 17  7 - 25 mg/dL Final    Creatinine 02/22/2022 0.77  0.60 - 0.93 mg/dL Final    eGFR if non African American 02/22/2022 77  > OR = 60 mL/min/1.73m2 Final    eGFR if  02/22/2022 89  > OR = 60 mL/min/1.73m2 Final    BUN/Creatinine Ratio 02/22/2022 NOT APPLICABLE  6 - 22 (calc) Final    Sodium 02/22/2022 136  135 - 146 mmol/L Final    Potassium 02/22/2022 4.1  3.5 - 5.3 mmol/L Final    Chloride 02/22/2022 101  98 - 110 mmol/L Final    CO2 02/22/2022 28  20 - 32 mmol/L Final    Calcium 02/22/2022 9.8  8.6 - 10.4 mg/dL Final    Total Protein 02/22/2022 6.9  6.1 - 8.1 g/dL Final    Albumin 02/22/2022 4.4  3.6 - 5.1 g/dL Final    Globulin, Total 02/22/2022 2.5  1.9 - 3.7 g/dL (calc) Final    Albumin/Globulin Ratio 02/22/2022 1.8  1.0 - 2.5 (calc) Final    Total Bilirubin 02/22/2022 0.5  0.2 - 1.2 mg/dL Final    Alkaline Phosphatase 02/22/2022 55  37 - 153 U/L Final    AST 02/22/2022 17  10 - 35 U/L Final    ALT 02/22/2022 22  6 - 29 U/L Final    Cholesterol 02/22/2022 158  <200 mg/dL Final    HDL 02/22/2022 70  > OR = 50 mg/dL Final    Triglycerides 02/22/2022 132  <150 mg/dL Final    LDL Cholesterol  02/22/2022 66  mg/dL (calc) Final    HDL/Cholesterol Ratio 02/22/2022 2.3  <5.0 (calc) Final    Non HDL Chol. (LDL+VLDL) 02/22/2022 88  <130 mg/dL (calc) Final    Creatinine, Urine 02/22/2022 90  20 - 275 mg/dL Final    Microalb, Ur 02/22/2022 1.4  See Note: mg/dL Final    Microalb/Creat Ratio 02/22/2022 16  <30 mcg/mg creat Final    Color, UA 02/22/2022 YELLOW  YELLOW Final    Appearance, UA 02/22/2022 CLOUDY (A) CLEAR Final    Specific Gravity, UA 02/22/2022 1.015  1.001 - 1.035 Final    pH, UA 02/22/2022 8.0  5.0 - 8.0 Final    Glucose, UA 02/22/2022 NEGATIVE  NEGATIVE Final    Bilirubin, UA 02/22/2022 NEGATIVE  NEGATIVE Final    Ketones, UA 02/22/2022 NEGATIVE  NEGATIVE Final    Occult Blood UA 02/22/2022 NEGATIVE  NEGATIVE Final    Protein, UA 02/22/2022 NEGATIVE  NEGATIVE Final    Nitrite, UA 02/22/2022 NEGATIVE  NEGATIVE Final    Leukocytes, UA 02/22/2022 TRACE (A) NEGATIVE Final    WBC Casts, UA 02/22/2022 6-10 (A) < OR = 5 /HPF Final    RBC Casts, UA 02/22/2022 0-2  < OR = 2 /HPF Final    Squam Epithel, UA 02/22/2022 10-20 (A) < OR = 5 /HPF Final    Bacteria, UA 02/22/2022 FEW (A) NONE SEEN /HPF Final    Amorphous, UA 02/22/2022 MODERATE (A) NONE OR FEW /HPF Final    Hyaline Casts, UA 02/22/2022 NONE SEEN  NONE SEEN /LPF Final    Reflexive Urine Culture 02/22/2022    Final    Urine Culture, Routine 02/22/2022    Final    TSH w/reflex to FT4 02/22/2022 3.41  0.40 - 4.50 mIU/L Final    WBC 02/22/2022 5.2  3.8 - 10.8 Thousand/uL Final    RBC 02/22/2022 5.08  3.80 - 5.10 Million/uL Final    Hemoglobin 02/22/2022 14.7  11.7 - 15.5 g/dL Final    Hematocrit 02/22/2022 44.7  35.0 - 45.0 % Final    MCV 02/22/2022 88.0  80.0 - 100.0 fL Final    MCH 02/22/2022 28.9  27.0 - 33.0 pg Final    MCHC 02/22/2022 32.9  32.0 - 36.0 g/dL Final    RDW 02/22/2022 12.2  11.0 - 15.0 % Final    Platelets 02/22/2022 344  140 - 400 Thousand/uL Final    MPV 02/22/2022 10.8  7.5 - 12.5 fL Final     Neutrophils, Abs 02/22/2022 2,657  1,500 - 7,800 cells/uL Final    Lymph # 02/22/2022 1,706  850 - 3,900 cells/uL Final    Mono # 02/22/2022 447  200 - 950 cells/uL Final    Eos # 02/22/2022 338  15 - 500 cells/uL Final    Baso # 02/22/2022 52  0 - 200 cells/uL Final    Neutrophils Relative 02/22/2022 51.1  % Final    Lymph % 02/22/2022 32.8  % Final    Mono % 02/22/2022 8.6  % Final    Eosinophil % 02/22/2022 6.5  % Final    Basophil % 02/22/2022 1.0  % Final       Past Medical History:   Diagnosis Date    Basal cell carcinoma 2005    R upper cut lip    Hyperlipidemia     Osteopenia      Past Surgical History:   Procedure Laterality Date    basil cell carcinoma removal      BREAST CYST ASPIRATION      HYSTERECTOMY      SINUS SURGERY       Family History   Problem Relation Age of Onset    Cancer Mother     Melanoma Neg Hx     Psoriasis Neg Hx     Lupus Neg Hx     Eczema Neg Hx        Marital Status:   Alcohol History:  reports current alcohol use.  Tobacco History:  reports that she has quit smoking. She has never used smokeless tobacco.  Drug History:  reports no history of drug use.    Review of patient's allergies indicates:   Allergen Reactions    Alendronic acid     Boniva [ibandronate]      Nausea and vomitting    Fosamax [alendronate] Nausea And Vomiting    Hydrocodone-acetaminophen     Hydrocodone-cpm-pseudoephed     Lovastatin     Oxycodone     Rosuvastatin        Current Outpatient Medications:     azelastine (ASTELIN) 137 mcg (0.1 %) nasal spray, 1 spray (137 mcg total) by Nasal route 2 (two) times daily., Disp: 30 mL, Rfl: 5    cephALEXin (KEFLEX) 500 MG capsule, Take 1 capsule (500 mg total) by mouth every 8 (eight) hours. for 7 days, Disp: 21 capsule, Rfl: 0    estradioL (ESTRACE) 0.5 MG tablet, Take 1 tablet (0.5 mg total) by mouth once daily., Disp: 90 tablet, Rfl: 1    hydroCHLOROthiazide (HYDRODIURIL) 12.5 MG Tab, Take 1 tablet (12.5 mg total) by mouth once  "daily., Disp: 90 tablet, Rfl: 1    latanoprost 0.005 % ophthalmic solution, , Disp: , Rfl:     latanoprost, PF, 0.005 % Drop, Apply to eye., Disp: , Rfl:     LIVALO 4 mg Tab, Take 1 tablet by mouth once daily. For 30 days, Disp: , Rfl:     risedronate (ACTONEL) 35 MG tablet, Take 1 tablet (35 mg total) by mouth every 7 days., Disp: 12 tablet, Rfl: 1    Review of Systems   Constitutional: Negative for activity change and fever.   Respiratory: Negative.    Cardiovascular: Negative.    Genitourinary: Negative.    Skin: Positive for wound.   Neurological: Negative.           Objective:      Vitals:    04/06/22 1125 04/06/22 1129   BP: (!) 142/86 (!) 146/88   Pulse: 82    SpO2: 96%    Weight: 64.1 kg (141 lb 6.4 oz)    Height: 5' 2" (1.575 m)      Body mass index is 25.86 kg/m².  Physical Exam  Constitutional:       Appearance: Normal appearance.   HENT:      Head: Normocephalic and atraumatic.   Cardiovascular:      Rate and Rhythm: Normal rate.   Pulmonary:      Effort: Pulmonary effort is normal.   Genitourinary:      Neurological:      Mental Status: She is alert and oriented to person, place, and time.   Psychiatric:         Mood and Affect: Mood normal.           Assessment:       1. Screening mammogram for breast cancer    2. Boil of vulva         Plan:       Screening mammogram for breast cancer  -     Mammo Digital Screening Bilat w/ Sid; Future; Expected date: 04/06/2022    Boil of vulva  -     cephALEXin (KEFLEX) 500 MG capsule; Take 1 capsule (500 mg total) by mouth every 8 (eight) hours. for 7 days  Dispense: 21 capsule; Refill: 0    Do not recommend I&D at this time, especially given location. Will treat with antibiotics and recommend sitz bath and warm compress to the area. Follow-up next week if swelling not improved or symptoms worsen/devlops fever    Follow up if symptoms worsen or fail to improve.              "

## 2022-04-06 NOTE — TELEPHONE ENCOUNTER
"----- Message from Adali Kavitha sent at 4/6/2022  9:14 AM CDT -----  Pt calling said she has a swollen "lymph gland" in her groin said it looks like a possible boil not painful but is irritating. She would like to be seen, prefers a female provider if possible Cb # [phone]    "

## 2022-04-11 DIAGNOSIS — N76.4 BOIL OF VULVA: Primary | ICD-10-CM

## 2022-04-11 RX ORDER — MINOCYCLINE HYDROCHLORIDE 100 MG/1
100 CAPSULE ORAL 2 TIMES DAILY
Qty: 20 CAPSULE | Refills: 0 | Status: SHIPPED | OUTPATIENT
Start: 2022-04-11 | End: 2022-04-18 | Stop reason: SDUPTHER

## 2022-04-11 NOTE — TELEPHONE ENCOUNTER
----- Message from Isabella Paiz sent at 4/11/2022  9:12 AM CDT -----  Pt needs to be seen ASAP. She still has swelling in her groin that now hurts and is almost done with the antibiotics. Please advise. pt #[phone]

## 2022-04-11 NOTE — TELEPHONE ENCOUNTER
The patient's prescription has been approved and sent to   [id number] - [place], [place] - [address]  [address]  [place] [address]  Phone: [phone] Fax: [phone]

## 2022-04-11 NOTE — TELEPHONE ENCOUNTER
Spoke to patient. Scheduled patient to see ANA Croft tomorrow and in the mean time Dr Yoder is going to change the antibiotic.    Rx set up

## 2022-04-12 ENCOUNTER — OFFICE VISIT (OUTPATIENT)
Dept: FAMILY MEDICINE | Facility: CLINIC | Age: 72
End: 2022-04-12
Payer: MEDICARE

## 2022-04-12 VITALS
SYSTOLIC BLOOD PRESSURE: 136 MMHG | DIASTOLIC BLOOD PRESSURE: 70 MMHG | WEIGHT: 144 LBS | BODY MASS INDEX: 27.19 KG/M2 | HEART RATE: 80 BPM | HEIGHT: 61 IN

## 2022-04-12 DIAGNOSIS — I10 ESSENTIAL HYPERTENSION: Primary | ICD-10-CM

## 2022-04-12 DIAGNOSIS — E78.01 FAMILIAL HYPERCHOLESTEROLEMIA: ICD-10-CM

## 2022-04-12 DIAGNOSIS — N76.4 ABSCESS OF LABIA: ICD-10-CM

## 2022-04-12 DIAGNOSIS — E78.2 MIXED HYPERLIPIDEMIA: ICD-10-CM

## 2022-04-12 PROCEDURE — 3008F PR BODY MASS INDEX (BMI) DOCUMENTED: ICD-10-PCS | Mod: S$GLB,,, | Performed by: NURSE PRACTITIONER

## 2022-04-12 PROCEDURE — 3288F PR FALLS RISK ASSESSMENT DOCUMENTED: ICD-10-PCS | Mod: S$GLB,,, | Performed by: NURSE PRACTITIONER

## 2022-04-12 PROCEDURE — 3075F SYST BP GE 130 - 139MM HG: CPT | Mod: S$GLB,,, | Performed by: NURSE PRACTITIONER

## 2022-04-12 PROCEDURE — 1159F PR MEDICATION LIST DOCUMENTED IN MEDICAL RECORD: ICD-10-PCS | Mod: S$GLB,,, | Performed by: NURSE PRACTITIONER

## 2022-04-12 PROCEDURE — 3066F NEPHROPATHY DOC TX: CPT | Mod: S$GLB,,, | Performed by: NURSE PRACTITIONER

## 2022-04-12 PROCEDURE — 99214 OFFICE O/P EST MOD 30 MIN: CPT | Mod: S$GLB,,, | Performed by: NURSE PRACTITIONER

## 2022-04-12 PROCEDURE — 3288F FALL RISK ASSESSMENT DOCD: CPT | Mod: S$GLB,,, | Performed by: NURSE PRACTITIONER

## 2022-04-12 PROCEDURE — 1160F PR REVIEW ALL MEDS BY PRESCRIBER/CLIN PHARMACIST DOCUMENTED: ICD-10-PCS | Mod: S$GLB,,, | Performed by: NURSE PRACTITIONER

## 2022-04-12 PROCEDURE — 1101F PR PT FALLS ASSESS DOC 0-1 FALLS W/OUT INJ PAST YR: ICD-10-PCS | Mod: S$GLB,,, | Performed by: NURSE PRACTITIONER

## 2022-04-12 PROCEDURE — 3075F PR MOST RECENT SYSTOLIC BLOOD PRESS GE 130-139MM HG: ICD-10-PCS | Mod: S$GLB,,, | Performed by: NURSE PRACTITIONER

## 2022-04-12 PROCEDURE — 3078F DIAST BP <80 MM HG: CPT | Mod: S$GLB,,, | Performed by: NURSE PRACTITIONER

## 2022-04-12 PROCEDURE — 3008F BODY MASS INDEX DOCD: CPT | Mod: S$GLB,,, | Performed by: NURSE PRACTITIONER

## 2022-04-12 PROCEDURE — 1159F MED LIST DOCD IN RCRD: CPT | Mod: S$GLB,,, | Performed by: NURSE PRACTITIONER

## 2022-04-12 PROCEDURE — 1160F RVW MEDS BY RX/DR IN RCRD: CPT | Mod: S$GLB,,, | Performed by: NURSE PRACTITIONER

## 2022-04-12 PROCEDURE — 3066F PR DOCUMENTATION OF TREATMENT FOR NEPHROPATHY: ICD-10-PCS | Mod: S$GLB,,, | Performed by: NURSE PRACTITIONER

## 2022-04-12 PROCEDURE — 1101F PT FALLS ASSESS-DOCD LE1/YR: CPT | Mod: S$GLB,,, | Performed by: NURSE PRACTITIONER

## 2022-04-12 PROCEDURE — 99214 PR OFFICE/OUTPT VISIT, EST, LEVL IV, 30-39 MIN: ICD-10-PCS | Mod: S$GLB,,, | Performed by: NURSE PRACTITIONER

## 2022-04-12 PROCEDURE — 3061F NEG MICROALBUMINURIA REV: CPT | Mod: S$GLB,,, | Performed by: NURSE PRACTITIONER

## 2022-04-12 PROCEDURE — 3078F PR MOST RECENT DIASTOLIC BLOOD PRESSURE < 80 MM HG: ICD-10-PCS | Mod: S$GLB,,, | Performed by: NURSE PRACTITIONER

## 2022-04-12 PROCEDURE — 3061F PR NEG MICROALBUMINURIA RESULT DOCUMENTED/REVIEW: ICD-10-PCS | Mod: S$GLB,,, | Performed by: NURSE PRACTITIONER

## 2022-04-12 NOTE — LETTER
[address]  [place] [address]  Phone: [phone]   [phone]                        MD Jeanmarie Land MD Chequita Williams, MD Matthew Bassett, PAJHONNY Yanez, ANA Hernandez, ANA Pinedo, ANA Duarte PA-C      Date: 04/12/2022        Patient: Shahana Mcknight  YOB: 1950      Please fax a copy of pt's recent colonoscopy.       Sincerely,     Sunita Suggs MA

## 2022-04-18 ENCOUNTER — OFFICE VISIT (OUTPATIENT)
Dept: FAMILY MEDICINE | Facility: CLINIC | Age: 72
End: 2022-04-18
Payer: MEDICARE

## 2022-04-18 VITALS
HEIGHT: 61 IN | SYSTOLIC BLOOD PRESSURE: 126 MMHG | WEIGHT: 143 LBS | DIASTOLIC BLOOD PRESSURE: 76 MMHG | HEART RATE: 76 BPM | BODY MASS INDEX: 27 KG/M2

## 2022-04-18 DIAGNOSIS — N76.4 ABSCESS OF LABIA: ICD-10-CM

## 2022-04-18 DIAGNOSIS — I10 ESSENTIAL HYPERTENSION: Primary | ICD-10-CM

## 2022-04-18 DIAGNOSIS — N76.4 BOIL OF VULVA: ICD-10-CM

## 2022-04-18 DIAGNOSIS — E78.2 MIXED HYPERLIPIDEMIA: ICD-10-CM

## 2022-04-18 PROCEDURE — 3061F NEG MICROALBUMINURIA REV: CPT | Mod: S$GLB,,, | Performed by: NURSE PRACTITIONER

## 2022-04-18 PROCEDURE — 3066F NEPHROPATHY DOC TX: CPT | Mod: S$GLB,,, | Performed by: NURSE PRACTITIONER

## 2022-04-18 PROCEDURE — 99214 OFFICE O/P EST MOD 30 MIN: CPT | Mod: S$GLB,,, | Performed by: NURSE PRACTITIONER

## 2022-04-18 PROCEDURE — 3074F PR MOST RECENT SYSTOLIC BLOOD PRESSURE < 130 MM HG: ICD-10-PCS | Mod: S$GLB,,, | Performed by: NURSE PRACTITIONER

## 2022-04-18 PROCEDURE — 3008F PR BODY MASS INDEX (BMI) DOCUMENTED: ICD-10-PCS | Mod: S$GLB,,, | Performed by: NURSE PRACTITIONER

## 2022-04-18 PROCEDURE — 1160F PR REVIEW ALL MEDS BY PRESCRIBER/CLIN PHARMACIST DOCUMENTED: ICD-10-PCS | Mod: S$GLB,,, | Performed by: NURSE PRACTITIONER

## 2022-04-18 PROCEDURE — 3074F SYST BP LT 130 MM HG: CPT | Mod: S$GLB,,, | Performed by: NURSE PRACTITIONER

## 2022-04-18 PROCEDURE — 3008F BODY MASS INDEX DOCD: CPT | Mod: S$GLB,,, | Performed by: NURSE PRACTITIONER

## 2022-04-18 PROCEDURE — 99214 PR OFFICE/OUTPT VISIT, EST, LEVL IV, 30-39 MIN: ICD-10-PCS | Mod: S$GLB,,, | Performed by: NURSE PRACTITIONER

## 2022-04-18 PROCEDURE — 1159F PR MEDICATION LIST DOCUMENTED IN MEDICAL RECORD: ICD-10-PCS | Mod: S$GLB,,, | Performed by: NURSE PRACTITIONER

## 2022-04-18 PROCEDURE — 3078F PR MOST RECENT DIASTOLIC BLOOD PRESSURE < 80 MM HG: ICD-10-PCS | Mod: S$GLB,,, | Performed by: NURSE PRACTITIONER

## 2022-04-18 PROCEDURE — 1159F MED LIST DOCD IN RCRD: CPT | Mod: S$GLB,,, | Performed by: NURSE PRACTITIONER

## 2022-04-18 PROCEDURE — 3078F DIAST BP <80 MM HG: CPT | Mod: S$GLB,,, | Performed by: NURSE PRACTITIONER

## 2022-04-18 PROCEDURE — 3066F PR DOCUMENTATION OF TREATMENT FOR NEPHROPATHY: ICD-10-PCS | Mod: S$GLB,,, | Performed by: NURSE PRACTITIONER

## 2022-04-18 PROCEDURE — 1160F RVW MEDS BY RX/DR IN RCRD: CPT | Mod: S$GLB,,, | Performed by: NURSE PRACTITIONER

## 2022-04-18 PROCEDURE — 3061F PR NEG MICROALBUMINURIA RESULT DOCUMENTED/REVIEW: ICD-10-PCS | Mod: S$GLB,,, | Performed by: NURSE PRACTITIONER

## 2022-04-18 RX ORDER — MINOCYCLINE HYDROCHLORIDE 100 MG/1
100 CAPSULE ORAL 2 TIMES DAILY
Qty: 20 CAPSULE | Refills: 0 | Status: SHIPPED | OUTPATIENT
Start: 2022-04-18 | End: 2022-09-29

## 2022-04-18 RX ORDER — MUPIROCIN 20 MG/G
OINTMENT TOPICAL 3 TIMES DAILY
Qty: 22 G | Refills: 0 | Status: SHIPPED | OUTPATIENT
Start: 2022-04-18 | End: 2022-09-29

## 2022-04-18 NOTE — PROGRESS NOTES
SUBJECTIVE:    Patient ID: Shahana Mcknight is a 71 y.o. female.    Chief Complaint: Lump in groin (X 8 days // did not bring bottles // Colonoscopy- Dr. Almeida, requested // Mammogram - order put in at last OV 6 days ago. ac) and Lump    71 year old female  presents for follow up. Was seen in our office about 1 week ago. She was diagnosed with left vulva abscess. Started on keflex. yesterday woke up with pain. No fever. Dr cortes called in meds last night. No drainage.       No visits with results within 6 Month(s) from this visit.   Latest known visit with results is:   Office Visit on 09/14/2021   Component Date Value Ref Range Status    Glucose 02/22/2022 95  65 - 99 mg/dL Final    BUN 02/22/2022 17  7 - 25 mg/dL Final    Creatinine 02/22/2022 0.77  0.60 - 0.93 mg/dL Final    eGFR if non African American 02/22/2022 77  > OR = 60 mL/min/1.73m2 Final    eGFR if  02/22/2022 89  > OR = 60 mL/min/1.73m2 Final    BUN/Creatinine Ratio 02/22/2022 NOT APPLICABLE  6 - 22 (calc) Final    Sodium 02/22/2022 136  135 - 146 mmol/L Final    Potassium 02/22/2022 4.1  3.5 - 5.3 mmol/L Final    Chloride 02/22/2022 101  98 - 110 mmol/L Final    CO2 02/22/2022 28  20 - 32 mmol/L Final    Calcium 02/22/2022 9.8  8.6 - 10.4 mg/dL Final    Total Protein 02/22/2022 6.9  6.1 - 8.1 g/dL Final    Albumin 02/22/2022 4.4  3.6 - 5.1 g/dL Final    Globulin, Total 02/22/2022 2.5  1.9 - 3.7 g/dL (calc) Final    Albumin/Globulin Ratio 02/22/2022 1.8  1.0 - 2.5 (calc) Final    Total Bilirubin 02/22/2022 0.5  0.2 - 1.2 mg/dL Final    Alkaline Phosphatase 02/22/2022 55  37 - 153 U/L Final    AST 02/22/2022 17  10 - 35 U/L Final    ALT 02/22/2022 22  6 - 29 U/L Final    Cholesterol 02/22/2022 158  <200 mg/dL Final    HDL 02/22/2022 70  > OR = 50 mg/dL Final    Triglycerides 02/22/2022 132  <150 mg/dL Final    LDL Cholesterol 02/22/2022 66  mg/dL (calc) Final    HDL/Cholesterol Ratio 02/22/2022 2.3  <5.0 (calc)  Final    Non HDL Chol. (LDL+VLDL) 02/22/2022 88  <130 mg/dL (calc) Final    Creatinine, Urine 02/22/2022 90  20 - 275 mg/dL Final    Microalb, Ur 02/22/2022 1.4  See Note: mg/dL Final    Microalb/Creat Ratio 02/22/2022 16  <30 mcg/mg creat Final    Color, UA 02/22/2022 YELLOW  YELLOW Final    Appearance, UA 02/22/2022 CLOUDY (A) CLEAR Final    Specific Gravity, UA 02/22/2022 1.015  1.001 - 1.035 Final    pH, UA 02/22/2022 8.0  5.0 - 8.0 Final    Glucose, UA 02/22/2022 NEGATIVE  NEGATIVE Final    Bilirubin, UA 02/22/2022 NEGATIVE  NEGATIVE Final    Ketones, UA 02/22/2022 NEGATIVE  NEGATIVE Final    Occult Blood UA 02/22/2022 NEGATIVE  NEGATIVE Final    Protein, UA 02/22/2022 NEGATIVE  NEGATIVE Final    Nitrite, UA 02/22/2022 NEGATIVE  NEGATIVE Final    Leukocytes, UA 02/22/2022 TRACE (A) NEGATIVE Final    WBC Casts, UA 02/22/2022 6-10 (A) < OR = 5 /HPF Final    RBC Casts, UA 02/22/2022 0-2  < OR = 2 /HPF Final    Squam Epithel, UA 02/22/2022 10-20 (A) < OR = 5 /HPF Final    Bacteria, UA 02/22/2022 FEW (A) NONE SEEN /HPF Final    Amorphous, UA 02/22/2022 MODERATE (A) NONE OR FEW /HPF Final    Hyaline Casts, UA 02/22/2022 NONE SEEN  NONE SEEN /LPF Final    Reflexive Urine Culture 02/22/2022    Final    Urine Culture, Routine 02/22/2022    Final    TSH w/reflex to FT4 02/22/2022 3.41  0.40 - 4.50 mIU/L Final    WBC 02/22/2022 5.2  3.8 - 10.8 Thousand/uL Final    RBC 02/22/2022 5.08  3.80 - 5.10 Million/uL Final    Hemoglobin 02/22/2022 14.7  11.7 - 15.5 g/dL Final    Hematocrit 02/22/2022 44.7  35.0 - 45.0 % Final    MCV 02/22/2022 88.0  80.0 - 100.0 fL Final    MCH 02/22/2022 28.9  27.0 - 33.0 pg Final    MCHC 02/22/2022 32.9  32.0 - 36.0 g/dL Final    RDW 02/22/2022 12.2  11.0 - 15.0 % Final    Platelets 02/22/2022 344  140 - 400 Thousand/uL Final    MPV 02/22/2022 10.8  7.5 - 12.5 fL Final    Neutrophils, Abs 02/22/2022 2,657  1,500 - 7,800 cells/uL Final    Lymph # 02/22/2022  1,706  850 - 3,900 cells/uL Final    Mono # 02/22/2022 447  200 - 950 cells/uL Final    Eos # 02/22/2022 338  15 - 500 cells/uL Final    Baso # 02/22/2022 52  0 - 200 cells/uL Final    Neutrophils Relative 02/22/2022 51.1  % Final    Lymph % 02/22/2022 32.8  % Final    Mono % 02/22/2022 8.6  % Final    Eosinophil % 02/22/2022 6.5  % Final    Basophil % 02/22/2022 1.0  % Final       Past Medical History:   Diagnosis Date    Basal cell carcinoma 2005    R upper cut lip    Hyperlipidemia     Osteopenia      Social History     Socioeconomic History    Marital status:    Tobacco Use    Smoking status: Former Smoker    Smokeless tobacco: Never Used   Substance and Sexual Activity    Alcohol use: Yes     Comment: socially    Drug use: No    Sexual activity: Yes     Partners: Male     Social Determinants of Health     Financial Resource Strain: Low Risk     Difficulty of Paying Living Expenses: Not hard at all   Food Insecurity: No Food Insecurity    Worried About Running Out of Food in the Last Year: Never true    Ran Out of Food in the Last Year: Never true   Transportation Needs: No Transportation Needs    Lack of Transportation (Medical): No    Lack of Transportation (Non-Medical): No   Physical Activity: Sufficiently Active    Days of Exercise per Week: 5 days    Minutes of Exercise per Session: 50 min   Stress: No Stress Concern Present    Feeling of Stress : Not at all   Social Connections: Unknown    Frequency of Communication with Friends and Family: More than three times a week    Frequency of Social Gatherings with Friends and Family: Twice a week    Active Member of Clubs or Organizations: Yes    Attends Club or Organization Meetings: More than 4 times per year    Marital Status:    Housing Stability: Low Risk     Unable to Pay for Housing in the Last Year: No    Number of Places Lived in the Last Year: 1    Unstable Housing in the Last Year: No     Past Surgical  "History:   Procedure Laterality Date    basil cell carcinoma removal      BREAST CYST ASPIRATION      HYSTERECTOMY      SINUS SURGERY       Family History   Problem Relation Age of Onset    Cancer Mother     Melanoma Neg Hx     Psoriasis Neg Hx     Lupus Neg Hx     Eczema Neg Hx        Review of patient's allergies indicates:   Allergen Reactions    Alendronic acid     Boniva [ibandronate]      Nausea and vomitting    Fosamax [alendronate] Nausea And Vomiting    Hydrocodone-acetaminophen     Hydrocodone-cpm-pseudoephed     Lovastatin     Oxycodone     Rosuvastatin        Current Outpatient Medications:     azelastine (ASTELIN) 137 mcg (0.1 %) nasal spray, 1 spray (137 mcg total) by Nasal route 2 (two) times daily., Disp: 30 mL, Rfl: 5    estradioL (ESTRACE) 0.5 MG tablet, Take 1 tablet (0.5 mg total) by mouth once daily., Disp: 90 tablet, Rfl: 1    hydroCHLOROthiazide (HYDRODIURIL) 12.5 MG Tab, Take 1 tablet (12.5 mg total) by mouth once daily., Disp: 90 tablet, Rfl: 1    latanoprost 0.005 % ophthalmic solution, , Disp: , Rfl:     latanoprost, PF, 0.005 % Drop, Apply to eye., Disp: , Rfl:     LIVALO 4 mg Tab, Take 1 tablet by mouth once daily. For 30 days, Disp: , Rfl:     minocycline (MINOCIN,DYNACIN) 100 MG capsule, Take 1 capsule (100 mg total) by mouth 2 (two) times daily., Disp: 20 capsule, Rfl: 0    risedronate (ACTONEL) 35 MG tablet, Take 1 tablet (35 mg total) by mouth every 7 days., Disp: 12 tablet, Rfl: 1    Review of Systems   Constitutional: Negative for activity change and fever.   Respiratory: Negative.    Cardiovascular: Negative.    Genitourinary: Negative.    Skin: Positive for wound.   Neurological: Negative.           Objective:      Vitals:    04/12/22 1123   BP: 136/70   Pulse: 80   Weight: 65.3 kg (144 lb)   Height: 5' 1" (1.549 m)     Physical Exam  Constitutional:       General: She is not in acute distress.     Appearance: Normal appearance. She is not ill-appearing. "   Cardiovascular:      Rate and Rhythm: Normal rate.   Pulmonary:      Effort: Pulmonary effort is normal.   Genitourinary:         Comments: Abscess noted. Soft. Mild induration erythema surrounding site.   Area cleaned with betadine. Injected with lidocaine. Pustular drainage expressed. k  Neurological:      Mental Status: She is alert and oriented to person, place, and time.   Psychiatric:         Mood and Affect: Mood normal.           Assessment:       1. Essential hypertension    2. Mixed hyperlipidemia    3. Familial hypercholesterolemia    4. Abscess of labia         Plan:       Essential hypertension    Mixed hyperlipidemia    Familial hypercholesterolemia      Follow up in about 5 days (around 4/17/2022) for medication management.        4/18/2022 Guerda Pinedo

## 2022-04-21 NOTE — PROGRESS NOTES
SUBJECTIVE:    Patient ID: Shahana Mkcnight is a 71 y.o. female.    Chief Complaint: Mass (Check boil in groin area, went over meds verbally// SW)    71 year old female  presents for follow up. Was seen in our office last week. We did perform I &d on abscess in groin area. She was started on minocycline per dr. Yoder. Has gotten some relief. But still present. Did drain initially but not any longer. Decrease swelling and pain to site. No fever.         No visits with results within 6 Month(s) from this visit.   Latest known visit with results is:   Office Visit on 09/14/2021   Component Date Value Ref Range Status    Glucose 02/22/2022 95  65 - 99 mg/dL Final    BUN 02/22/2022 17  7 - 25 mg/dL Final    Creatinine 02/22/2022 0.77  0.60 - 0.93 mg/dL Final    eGFR if non African American 02/22/2022 77  > OR = 60 mL/min/1.73m2 Final    eGFR if  02/22/2022 89  > OR = 60 mL/min/1.73m2 Final    BUN/Creatinine Ratio 02/22/2022 NOT APPLICABLE  6 - 22 (calc) Final    Sodium 02/22/2022 136  135 - 146 mmol/L Final    Potassium 02/22/2022 4.1  3.5 - 5.3 mmol/L Final    Chloride 02/22/2022 101  98 - 110 mmol/L Final    CO2 02/22/2022 28  20 - 32 mmol/L Final    Calcium 02/22/2022 9.8  8.6 - 10.4 mg/dL Final    Total Protein 02/22/2022 6.9  6.1 - 8.1 g/dL Final    Albumin 02/22/2022 4.4  3.6 - 5.1 g/dL Final    Globulin, Total 02/22/2022 2.5  1.9 - 3.7 g/dL (calc) Final    Albumin/Globulin Ratio 02/22/2022 1.8  1.0 - 2.5 (calc) Final    Total Bilirubin 02/22/2022 0.5  0.2 - 1.2 mg/dL Final    Alkaline Phosphatase 02/22/2022 55  37 - 153 U/L Final    AST 02/22/2022 17  10 - 35 U/L Final    ALT 02/22/2022 22  6 - 29 U/L Final    Cholesterol 02/22/2022 158  <200 mg/dL Final    HDL 02/22/2022 70  > OR = 50 mg/dL Final    Triglycerides 02/22/2022 132  <150 mg/dL Final    LDL Cholesterol 02/22/2022 66  mg/dL (calc) Final    HDL/Cholesterol Ratio 02/22/2022 2.3  <5.0 (calc) Final    Non HDL  Chol. (LDL+VLDL) 02/22/2022 88  <130 mg/dL (calc) Final    Creatinine, Urine 02/22/2022 90  20 - 275 mg/dL Final    Microalb, Ur 02/22/2022 1.4  See Note: mg/dL Final    Microalb/Creat Ratio 02/22/2022 16  <30 mcg/mg creat Final    Color, UA 02/22/2022 YELLOW  YELLOW Final    Appearance, UA 02/22/2022 CLOUDY (A) CLEAR Final    Specific Gravity, UA 02/22/2022 1.015  1.001 - 1.035 Final    pH, UA 02/22/2022 8.0  5.0 - 8.0 Final    Glucose, UA 02/22/2022 NEGATIVE  NEGATIVE Final    Bilirubin, UA 02/22/2022 NEGATIVE  NEGATIVE Final    Ketones, UA 02/22/2022 NEGATIVE  NEGATIVE Final    Occult Blood UA 02/22/2022 NEGATIVE  NEGATIVE Final    Protein, UA 02/22/2022 NEGATIVE  NEGATIVE Final    Nitrite, UA 02/22/2022 NEGATIVE  NEGATIVE Final    Leukocytes, UA 02/22/2022 TRACE (A) NEGATIVE Final    WBC Casts, UA 02/22/2022 6-10 (A) < OR = 5 /HPF Final    RBC Casts, UA 02/22/2022 0-2  < OR = 2 /HPF Final    Squam Epithel, UA 02/22/2022 10-20 (A) < OR = 5 /HPF Final    Bacteria, UA 02/22/2022 FEW (A) NONE SEEN /HPF Final    Amorphous, UA 02/22/2022 MODERATE (A) NONE OR FEW /HPF Final    Hyaline Casts, UA 02/22/2022 NONE SEEN  NONE SEEN /LPF Final    Reflexive Urine Culture 02/22/2022    Final    Urine Culture, Routine 02/22/2022    Final    TSH w/reflex to FT4 02/22/2022 3.41  0.40 - 4.50 mIU/L Final    WBC 02/22/2022 5.2  3.8 - 10.8 Thousand/uL Final    RBC 02/22/2022 5.08  3.80 - 5.10 Million/uL Final    Hemoglobin 02/22/2022 14.7  11.7 - 15.5 g/dL Final    Hematocrit 02/22/2022 44.7  35.0 - 45.0 % Final    MCV 02/22/2022 88.0  80.0 - 100.0 fL Final    MCH 02/22/2022 28.9  27.0 - 33.0 pg Final    MCHC 02/22/2022 32.9  32.0 - 36.0 g/dL Final    RDW 02/22/2022 12.2  11.0 - 15.0 % Final    Platelets 02/22/2022 344  140 - 400 Thousand/uL Final    MPV 02/22/2022 10.8  7.5 - 12.5 fL Final    Neutrophils, Abs 02/22/2022 2,657  1,500 - 7,800 cells/uL Final    Lymph # 02/22/2022 1,706  850 - 3,900  cells/uL Final    Mono # 02/22/2022 447  200 - 950 cells/uL Final    Eos # 02/22/2022 338  15 - 500 cells/uL Final    Baso # 02/22/2022 52  0 - 200 cells/uL Final    Neutrophils Relative 02/22/2022 51.1  % Final    Lymph % 02/22/2022 32.8  % Final    Mono % 02/22/2022 8.6  % Final    Eosinophil % 02/22/2022 6.5  % Final    Basophil % 02/22/2022 1.0  % Final       Past Medical History:   Diagnosis Date    Basal cell carcinoma 2005    R upper cut lip    Hyperlipidemia     Osteopenia      Social History     Socioeconomic History    Marital status:    Tobacco Use    Smoking status: Former Smoker    Smokeless tobacco: Never Used   Substance and Sexual Activity    Alcohol use: Yes     Comment: socially    Drug use: No    Sexual activity: Yes     Partners: Male     Social Determinants of Health     Financial Resource Strain: Low Risk     Difficulty of Paying Living Expenses: Not hard at all   Food Insecurity: No Food Insecurity    Worried About Running Out of Food in the Last Year: Never true    Ran Out of Food in the Last Year: Never true   Transportation Needs: No Transportation Needs    Lack of Transportation (Medical): No    Lack of Transportation (Non-Medical): No   Physical Activity: Sufficiently Active    Days of Exercise per Week: 5 days    Minutes of Exercise per Session: 50 min   Stress: No Stress Concern Present    Feeling of Stress : Not at all   Social Connections: Unknown    Frequency of Communication with Friends and Family: More than three times a week    Frequency of Social Gatherings with Friends and Family: Twice a week    Active Member of Clubs or Organizations: Yes    Attends Club or Organization Meetings: More than 4 times per year    Marital Status:    Housing Stability: Low Risk     Unable to Pay for Housing in the Last Year: No    Number of Places Lived in the Last Year: 1    Unstable Housing in the Last Year: No     Past Surgical History:   Procedure  Laterality Date    basil cell carcinoma removal      BREAST CYST ASPIRATION      HYSTERECTOMY      SINUS SURGERY       Family History   Problem Relation Age of Onset    Cancer Mother     Melanoma Neg Hx     Psoriasis Neg Hx     Lupus Neg Hx     Eczema Neg Hx        Review of patient's allergies indicates:   Allergen Reactions    Alendronic acid     Boniva [ibandronate]      Nausea and vomitting    Fosamax [alendronate] Nausea And Vomiting    Hydrocodone-acetaminophen     Hydrocodone-cpm-pseudoephed     Lovastatin     Oxycodone     Rosuvastatin        Current Outpatient Medications:     azelastine (ASTELIN) 137 mcg (0.1 %) nasal spray, 1 spray (137 mcg total) by Nasal route 2 (two) times daily., Disp: 30 mL, Rfl: 5    estradioL (ESTRACE) 0.5 MG tablet, Take 1 tablet (0.5 mg total) by mouth once daily., Disp: 90 tablet, Rfl: 1    hydroCHLOROthiazide (HYDRODIURIL) 12.5 MG Tab, Take 1 tablet (12.5 mg total) by mouth once daily., Disp: 90 tablet, Rfl: 1    latanoprost 0.005 % ophthalmic solution, , Disp: , Rfl:     latanoprost, PF, 0.005 % Drop, Apply to eye., Disp: , Rfl:     LIVALO 4 mg Tab, Take 1 tablet by mouth once daily. For 30 days, Disp: , Rfl:     risedronate (ACTONEL) 35 MG tablet, Take 1 tablet (35 mg total) by mouth every 7 days., Disp: 12 tablet, Rfl: 1    minocycline (MINOCIN,DYNACIN) 100 MG capsule, Take 1 capsule (100 mg total) by mouth 2 (two) times daily., Disp: 20 capsule, Rfl: 0    mupirocin (BACTROBAN) 2 % ointment, Apply topically 3 (three) times daily., Disp: 22 g, Rfl: 0    Review of Systems   Constitutional: Negative for chills and fever.   Respiratory: Negative for cough and shortness of breath.    Cardiovascular: Negative for chest pain and leg swelling.   Gastrointestinal: Negative for abdominal pain.   Genitourinary: Negative for difficulty urinating, flank pain, hematuria and urgency.   Musculoskeletal: Negative for back pain.   Skin: Positive for wound.       "    Objective:      Vitals:    04/18/22 1338   BP: 126/76   Pulse: 76   Weight: 64.9 kg (143 lb)   Height: 5' 1" (1.549 m)     Physical Exam  Constitutional:       General: She is not in acute distress.     Appearance: She is well-developed. She is not ill-appearing.   Cardiovascular:      Rate and Rhythm: Normal rate and regular rhythm.      Heart sounds: Normal heart sounds.   Pulmonary:      Effort: Pulmonary effort is normal.      Breath sounds: Normal breath sounds.   Abdominal:      General: Bowel sounds are normal.      Palpations: Abdomen is soft.      Tenderness: There is no abdominal tenderness.   Genitourinary:         Comments: Area decreased in erythema. Minimal fluctuance. No drainage expressed.   Neurological:      Mental Status: She is alert.           Assessment:       1. Essential hypertension    2. Boil of vulva    3. Mixed hyperlipidemia    4. Abscess of labia         Plan:       Essential hypertension    Boil of vulva  -     minocycline (MINOCIN,DYNACIN) 100 MG capsule; Take 1 capsule (100 mg total) by mouth 2 (two) times daily.  Dispense: 20 capsule; Refill: 0    Mixed hyperlipidemia    Abscess of labia  Comments:  keep site clean and dry. minocycline 7 additional days    Other orders  -     mupirocin (BACTROBAN) 2 % ointment; Apply topically 3 (three) times daily.  Dispense: 22 g; Refill: 0      Follow up if symptoms worsen or fail to improve, for medication management.        4/21/2022 Guerda Pinedo      "

## 2022-04-26 ENCOUNTER — OFFICE VISIT (OUTPATIENT)
Dept: FAMILY MEDICINE | Facility: CLINIC | Age: 72
End: 2022-04-26
Payer: MEDICARE

## 2022-04-26 VITALS
DIASTOLIC BLOOD PRESSURE: 68 MMHG | SYSTOLIC BLOOD PRESSURE: 118 MMHG | WEIGHT: 143 LBS | HEIGHT: 60 IN | BODY MASS INDEX: 28.07 KG/M2 | HEART RATE: 76 BPM

## 2022-04-26 DIAGNOSIS — I10 HTN (HYPERTENSION) WITH GOAL TO BE DETERMINED: ICD-10-CM

## 2022-04-26 DIAGNOSIS — Z12.31 OTHER SCREENING MAMMOGRAM: Primary | ICD-10-CM

## 2022-04-26 DIAGNOSIS — N76.4 ABSCESS OF LABIA: ICD-10-CM

## 2022-04-26 PROCEDURE — 3066F PR DOCUMENTATION OF TREATMENT FOR NEPHROPATHY: ICD-10-PCS | Mod: CPTII,S$GLB,, | Performed by: NURSE PRACTITIONER

## 2022-04-26 PROCEDURE — 3066F NEPHROPATHY DOC TX: CPT | Mod: CPTII,S$GLB,, | Performed by: NURSE PRACTITIONER

## 2022-04-26 PROCEDURE — 3061F PR NEG MICROALBUMINURIA RESULT DOCUMENTED/REVIEW: ICD-10-PCS | Mod: CPTII,S$GLB,, | Performed by: NURSE PRACTITIONER

## 2022-04-26 PROCEDURE — 3078F PR MOST RECENT DIASTOLIC BLOOD PRESSURE < 80 MM HG: ICD-10-PCS | Mod: CPTII,S$GLB,, | Performed by: NURSE PRACTITIONER

## 2022-04-26 PROCEDURE — 1101F PT FALLS ASSESS-DOCD LE1/YR: CPT | Mod: CPTII,S$GLB,, | Performed by: NURSE PRACTITIONER

## 2022-04-26 PROCEDURE — 3288F FALL RISK ASSESSMENT DOCD: CPT | Mod: CPTII,S$GLB,, | Performed by: NURSE PRACTITIONER

## 2022-04-26 PROCEDURE — 3008F PR BODY MASS INDEX (BMI) DOCUMENTED: ICD-10-PCS | Mod: CPTII,S$GLB,, | Performed by: NURSE PRACTITIONER

## 2022-04-26 PROCEDURE — 99213 PR OFFICE/OUTPT VISIT, EST, LEVL III, 20-29 MIN: ICD-10-PCS | Mod: S$GLB,,, | Performed by: NURSE PRACTITIONER

## 2022-04-26 PROCEDURE — 3074F SYST BP LT 130 MM HG: CPT | Mod: CPTII,S$GLB,, | Performed by: NURSE PRACTITIONER

## 2022-04-26 PROCEDURE — 3078F DIAST BP <80 MM HG: CPT | Mod: CPTII,S$GLB,, | Performed by: NURSE PRACTITIONER

## 2022-04-26 PROCEDURE — 1101F PR PT FALLS ASSESS DOC 0-1 FALLS W/OUT INJ PAST YR: ICD-10-PCS | Mod: CPTII,S$GLB,, | Performed by: NURSE PRACTITIONER

## 2022-04-26 PROCEDURE — 1160F RVW MEDS BY RX/DR IN RCRD: CPT | Mod: CPTII,S$GLB,, | Performed by: NURSE PRACTITIONER

## 2022-04-26 PROCEDURE — 1159F MED LIST DOCD IN RCRD: CPT | Mod: CPTII,S$GLB,, | Performed by: NURSE PRACTITIONER

## 2022-04-26 PROCEDURE — 3061F NEG MICROALBUMINURIA REV: CPT | Mod: CPTII,S$GLB,, | Performed by: NURSE PRACTITIONER

## 2022-04-26 PROCEDURE — 1159F PR MEDICATION LIST DOCUMENTED IN MEDICAL RECORD: ICD-10-PCS | Mod: CPTII,S$GLB,, | Performed by: NURSE PRACTITIONER

## 2022-04-26 PROCEDURE — 1160F PR REVIEW ALL MEDS BY PRESCRIBER/CLIN PHARMACIST DOCUMENTED: ICD-10-PCS | Mod: CPTII,S$GLB,, | Performed by: NURSE PRACTITIONER

## 2022-04-26 PROCEDURE — 3008F BODY MASS INDEX DOCD: CPT | Mod: CPTII,S$GLB,, | Performed by: NURSE PRACTITIONER

## 2022-04-26 PROCEDURE — 99213 OFFICE O/P EST LOW 20 MIN: CPT | Mod: S$GLB,,, | Performed by: NURSE PRACTITIONER

## 2022-04-26 PROCEDURE — 3288F PR FALLS RISK ASSESSMENT DOCUMENTED: ICD-10-PCS | Mod: CPTII,S$GLB,, | Performed by: NURSE PRACTITIONER

## 2022-04-26 PROCEDURE — 3074F PR MOST RECENT SYSTOLIC BLOOD PRESSURE < 130 MM HG: ICD-10-PCS | Mod: CPTII,S$GLB,, | Performed by: NURSE PRACTITIONER

## 2022-04-29 ENCOUNTER — PATIENT MESSAGE (OUTPATIENT)
Dept: FAMILY MEDICINE | Facility: CLINIC | Age: 72
End: 2022-04-29

## 2022-05-09 NOTE — PROGRESS NOTES
SUBJECTIVE:    Patient ID: Shahana Mcknight is a 71 y.o. female.    Chief Complaint: Follow-up (1 week f/u for boil on groin left side)    71 year old female  presents for follow up. I &d on abscess in groin area was performed on 4/12.. She was started on minocycline per dr. Yoder. Has gotten some relief. No longer draining Decrease swelling and pain to site. No fever.         No visits with results within 6 Month(s) from this visit.   Latest known visit with results is:   Office Visit on 09/14/2021   Component Date Value Ref Range Status    Glucose 02/22/2022 95  65 - 99 mg/dL Final    BUN 02/22/2022 17  7 - 25 mg/dL Final    Creatinine 02/22/2022 0.77  0.60 - 0.93 mg/dL Final    eGFR if non African American 02/22/2022 77  > OR = 60 mL/min/1.73m2 Final    eGFR if  02/22/2022 89  > OR = 60 mL/min/1.73m2 Final    BUN/Creatinine Ratio 02/22/2022 NOT APPLICABLE  6 - 22 (calc) Final    Sodium 02/22/2022 136  135 - 146 mmol/L Final    Potassium 02/22/2022 4.1  3.5 - 5.3 mmol/L Final    Chloride 02/22/2022 101  98 - 110 mmol/L Final    CO2 02/22/2022 28  20 - 32 mmol/L Final    Calcium 02/22/2022 9.8  8.6 - 10.4 mg/dL Final    Total Protein 02/22/2022 6.9  6.1 - 8.1 g/dL Final    Albumin 02/22/2022 4.4  3.6 - 5.1 g/dL Final    Globulin, Total 02/22/2022 2.5  1.9 - 3.7 g/dL (calc) Final    Albumin/Globulin Ratio 02/22/2022 1.8  1.0 - 2.5 (calc) Final    Total Bilirubin 02/22/2022 0.5  0.2 - 1.2 mg/dL Final    Alkaline Phosphatase 02/22/2022 55  37 - 153 U/L Final    AST 02/22/2022 17  10 - 35 U/L Final    ALT 02/22/2022 22  6 - 29 U/L Final    Cholesterol 02/22/2022 158  <200 mg/dL Final    HDL 02/22/2022 70  > OR = 50 mg/dL Final    Triglycerides 02/22/2022 132  <150 mg/dL Final    LDL Cholesterol 02/22/2022 66  mg/dL (calc) Final    HDL/Cholesterol Ratio 02/22/2022 2.3  <5.0 (calc) Final    Non HDL Chol. (LDL+VLDL) 02/22/2022 88  <130 mg/dL (calc) Final    Creatinine, Urine  02/22/2022 90  20 - 275 mg/dL Final    Microalb, Ur 02/22/2022 1.4  See Note: mg/dL Final    Microalb/Creat Ratio 02/22/2022 16  <30 mcg/mg creat Final    Color, UA 02/22/2022 YELLOW  YELLOW Final    Appearance, UA 02/22/2022 CLOUDY (A) CLEAR Final    Specific Gravity, UA 02/22/2022 1.015  1.001 - 1.035 Final    pH, UA 02/22/2022 8.0  5.0 - 8.0 Final    Glucose, UA 02/22/2022 NEGATIVE  NEGATIVE Final    Bilirubin, UA 02/22/2022 NEGATIVE  NEGATIVE Final    Ketones, UA 02/22/2022 NEGATIVE  NEGATIVE Final    Occult Blood UA 02/22/2022 NEGATIVE  NEGATIVE Final    Protein, UA 02/22/2022 NEGATIVE  NEGATIVE Final    Nitrite, UA 02/22/2022 NEGATIVE  NEGATIVE Final    Leukocytes, UA 02/22/2022 TRACE (A) NEGATIVE Final    WBC Casts, UA 02/22/2022 6-10 (A) < OR = 5 /HPF Final    RBC Casts, UA 02/22/2022 0-2  < OR = 2 /HPF Final    Squam Epithel, UA 02/22/2022 10-20 (A) < OR = 5 /HPF Final    Bacteria, UA 02/22/2022 FEW (A) NONE SEEN /HPF Final    Amorphous, UA 02/22/2022 MODERATE (A) NONE OR FEW /HPF Final    Hyaline Casts, UA 02/22/2022 NONE SEEN  NONE SEEN /LPF Final    Reflexive Urine Culture 02/22/2022    Final    Urine Culture, Routine 02/22/2022    Final    TSH w/reflex to FT4 02/22/2022 3.41  0.40 - 4.50 mIU/L Final    WBC 02/22/2022 5.2  3.8 - 10.8 Thousand/uL Final    RBC 02/22/2022 5.08  3.80 - 5.10 Million/uL Final    Hemoglobin 02/22/2022 14.7  11.7 - 15.5 g/dL Final    Hematocrit 02/22/2022 44.7  35.0 - 45.0 % Final    MCV 02/22/2022 88.0  80.0 - 100.0 fL Final    MCH 02/22/2022 28.9  27.0 - 33.0 pg Final    MCHC 02/22/2022 32.9  32.0 - 36.0 g/dL Final    RDW 02/22/2022 12.2  11.0 - 15.0 % Final    Platelets 02/22/2022 344  140 - 400 Thousand/uL Final    MPV 02/22/2022 10.8  7.5 - 12.5 fL Final    Neutrophils, Abs 02/22/2022 2,657  1,500 - 7,800 cells/uL Final    Lymph # 02/22/2022 1,706  850 - 3,900 cells/uL Final    Mono # 02/22/2022 447  200 - 950 cells/uL Final    Eos #  02/22/2022 338  15 - 500 cells/uL Final    Baso # 02/22/2022 52  0 - 200 cells/uL Final    Neutrophils Relative 02/22/2022 51.1  % Final    Lymph % 02/22/2022 32.8  % Final    Mono % 02/22/2022 8.6  % Final    Eosinophil % 02/22/2022 6.5  % Final    Basophil % 02/22/2022 1.0  % Final       Past Medical History:   Diagnosis Date    Basal cell carcinoma 2005    R upper cut lip    Hyperlipidemia     Osteopenia      Social History     Socioeconomic History    Marital status:    Tobacco Use    Smoking status: Former Smoker    Smokeless tobacco: Never Used   Substance and Sexual Activity    Alcohol use: Yes     Comment: socially    Drug use: No    Sexual activity: Yes     Partners: Male     Social Determinants of Health     Financial Resource Strain: Low Risk     Difficulty of Paying Living Expenses: Not hard at all   Food Insecurity: No Food Insecurity    Worried About Running Out of Food in the Last Year: Never true    Ran Out of Food in the Last Year: Never true   Transportation Needs: No Transportation Needs    Lack of Transportation (Medical): No    Lack of Transportation (Non-Medical): No   Physical Activity: Sufficiently Active    Days of Exercise per Week: 5 days    Minutes of Exercise per Session: 50 min   Stress: No Stress Concern Present    Feeling of Stress : Not at all   Social Connections: Unknown    Frequency of Communication with Friends and Family: More than three times a week    Frequency of Social Gatherings with Friends and Family: Twice a week    Active Member of Clubs or Organizations: Yes    Attends Club or Organization Meetings: More than 4 times per year    Marital Status:    Housing Stability: Low Risk     Unable to Pay for Housing in the Last Year: No    Number of Places Lived in the Last Year: 1    Unstable Housing in the Last Year: No     Past Surgical History:   Procedure Laterality Date    basil cell carcinoma removal      BREAST CYST ASPIRATION       HYSTERECTOMY      SINUS SURGERY       Family History   Problem Relation Age of Onset    Cancer Mother     Melanoma Neg Hx     Psoriasis Neg Hx     Lupus Neg Hx     Eczema Neg Hx        Review of patient's allergies indicates:   Allergen Reactions    Alendronic acid     Boniva [ibandronate]      Nausea and vomitting    Fosamax [alendronate] Nausea And Vomiting    Hydrocodone-acetaminophen     Hydrocodone-cpm-pseudoephed     Lovastatin     Oxycodone     Rosuvastatin        Current Outpatient Medications:     azelastine (ASTELIN) 137 mcg (0.1 %) nasal spray, 1 spray (137 mcg total) by Nasal route 2 (two) times daily., Disp: 30 mL, Rfl: 5    estradioL (ESTRACE) 0.5 MG tablet, Take 1 tablet (0.5 mg total) by mouth once daily., Disp: 90 tablet, Rfl: 1    hydroCHLOROthiazide (HYDRODIURIL) 12.5 MG Tab, Take 1 tablet (12.5 mg total) by mouth once daily., Disp: 90 tablet, Rfl: 1    latanoprost 0.005 % ophthalmic solution, , Disp: , Rfl:     latanoprost, PF, 0.005 % Drop, Apply to eye., Disp: , Rfl:     LIVALO 4 mg Tab, Take 1 tablet by mouth once daily. For 30 days, Disp: , Rfl:     minocycline (MINOCIN,DYNACIN) 100 MG capsule, Take 1 capsule (100 mg total) by mouth 2 (two) times daily., Disp: 20 capsule, Rfl: 0    risedronate (ACTONEL) 35 MG tablet, Take 1 tablet (35 mg total) by mouth every 7 days., Disp: 12 tablet, Rfl: 1    mupirocin (BACTROBAN) 2 % ointment, Apply topically 3 (three) times daily. (Patient not taking: Reported on 4/26/2022), Disp: 22 g, Rfl: 0    Review of Systems   Constitutional: Negative for chills and fever.   Respiratory: Negative for cough and shortness of breath.    Cardiovascular: Negative for chest pain and leg swelling.   Gastrointestinal: Negative for abdominal pain.   Genitourinary: Negative for difficulty urinating, flank pain, hematuria and urgency.   Musculoskeletal: Negative for back pain.   Skin: Positive for wound.          Objective:      Vitals:    04/26/22  1028   BP: 118/68   Pulse: 76   Weight: 64.9 kg (143 lb)   Height: 5' (1.524 m)     Physical Exam  Constitutional:       General: She is not in acute distress.     Appearance: She is well-developed. She is not ill-appearing.   Cardiovascular:      Rate and Rhythm: Normal rate and regular rhythm.      Heart sounds: Normal heart sounds.   Pulmonary:      Effort: Pulmonary effort is normal.      Breath sounds: Normal breath sounds.   Abdominal:      General: Bowel sounds are normal.      Palpations: Abdomen is soft.      Tenderness: There is no abdominal tenderness.   Genitourinary:         Comments: Area decreased in erythema. no fluctuance. No drainage expressed.   Neurological:      Mental Status: She is alert.           Assessment:       1. Other screening mammogram    2. HTN (hypertension) with goal to be determined    3. Abscess of labia         Plan:       Other screening mammogram  -     Mammo Digital Screening Bilat; Future; Expected date: 05/08/2022    HTN (hypertension) with goal to be determined    Abscess of labia  Comments:  keep site clean and dry. complete antibiotics.       Follow up if symptoms worsen or fail to improve, for medication management.        5/8/2022 Guerda Pinedo

## 2022-05-16 ENCOUNTER — HOSPITAL ENCOUNTER (OUTPATIENT)
Dept: RADIOLOGY | Facility: HOSPITAL | Age: 72
Discharge: HOME | End: 2022-05-16
Attending: NURSE PRACTITIONER
Payer: MEDICARE

## 2022-05-16 VITALS — BODY MASS INDEX: 28.09 KG/M2 | HEIGHT: 60 IN | WEIGHT: 143.06 LBS

## 2022-05-16 DIAGNOSIS — Z12.31 OTHER SCREENING MAMMOGRAM: ICD-10-CM

## 2022-05-16 PROCEDURE — 77067 SCR MAMMO BI INCL CAD: CPT | Mod: TC,PO

## 2022-05-16 PROCEDURE — 77063 BREAST TOMOSYNTHESIS BI: CPT | Mod: TC,PO

## 2022-05-23 ENCOUNTER — TELEPHONE (OUTPATIENT)
Dept: FAMILY MEDICINE | Facility: CLINIC | Age: 72
End: 2022-05-23

## 2022-05-23 NOTE — TELEPHONE ENCOUNTER
----- Message from Adali Plummer sent at 5/23/2022 12:48 PM CDT -----  Pt calling for hre mammo results  # [phone]

## 2022-05-23 NOTE — TELEPHONE ENCOUNTER
Spoke to patient. Advised she has a nodule by right nipple. Needs right breast ultrasound done. Order in the system. Patient is going to call to schedule.

## 2022-06-02 ENCOUNTER — IMMUNIZATION (OUTPATIENT)
Dept: PRIMARY CARE CLINIC | Facility: CLINIC | Age: 72
End: 2022-06-02
Payer: MEDICARE

## 2022-06-02 DIAGNOSIS — Z23 NEED FOR VACCINATION: Primary | ICD-10-CM

## 2022-06-02 PROCEDURE — 0054A PR IMMUNIZ ADMIN, SARS-COV-2 COVID-19 VACC, TRI SUCROSE, 30MCG/0.3ML, BOOSTER DOSE: ICD-10-PCS | Mod: S$GLB,,, | Performed by: FAMILY MEDICINE

## 2022-06-02 PROCEDURE — 0054A PR IMMUNIZ ADMIN, SARS-COV-2 COVID-19 VACC, TRI SUCROSE, 30MCG/0.3ML, BOOSTER DOSE: CPT | Mod: S$GLB,,, | Performed by: FAMILY MEDICINE

## 2022-06-02 PROCEDURE — 91305 COVID-19, MRNA, LNP-S, PF, 30 MCG/0.3 ML DOSE VACCINE (PFIZER): CPT | Mod: S$GLB,,, | Performed by: FAMILY MEDICINE

## 2022-06-02 PROCEDURE — 91305 COVID-19, MRNA, LNP-S, PF, 30 MCG/0.3 ML DOSE VACCINE (PFIZER): ICD-10-PCS | Mod: S$GLB,,, | Performed by: FAMILY MEDICINE

## 2022-06-03 ENCOUNTER — HOSPITAL ENCOUNTER (OUTPATIENT)
Dept: RADIOLOGY | Facility: HOSPITAL | Age: 72
Discharge: HOME | End: 2022-06-03
Attending: NURSE PRACTITIONER
Payer: MEDICARE

## 2022-06-03 DIAGNOSIS — R92.2 INCONCLUSIVE MAMMOGRAM: ICD-10-CM

## 2022-06-03 PROCEDURE — 76642 ULTRASOUND BREAST LIMITED: CPT | Mod: TC,PO,RT

## 2022-09-14 ENCOUNTER — OFFICE VISIT (OUTPATIENT)
Dept: FAMILY MEDICINE | Facility: CLINIC | Age: 72
End: 2022-09-14
Payer: MEDICARE

## 2022-09-14 VITALS
BODY MASS INDEX: 27.88 KG/M2 | HEIGHT: 60 IN | DIASTOLIC BLOOD PRESSURE: 78 MMHG | HEART RATE: 66 BPM | SYSTOLIC BLOOD PRESSURE: 148 MMHG | WEIGHT: 142 LBS

## 2022-09-14 DIAGNOSIS — J31.0 CHRONIC RHINITIS: ICD-10-CM

## 2022-09-14 DIAGNOSIS — M81.0 AGE-RELATED OSTEOPOROSIS WITHOUT CURRENT PATHOLOGICAL FRACTURE: ICD-10-CM

## 2022-09-14 DIAGNOSIS — Z79.899 LONG-TERM USE OF HIGH-RISK MEDICATION: ICD-10-CM

## 2022-09-14 DIAGNOSIS — N39.498 OTHER URINARY INCONTINENCE: ICD-10-CM

## 2022-09-14 DIAGNOSIS — K21.9 GASTROESOPHAGEAL REFLUX DISEASE WITHOUT ESOPHAGITIS: ICD-10-CM

## 2022-09-14 DIAGNOSIS — Z13.89 SCREENING FOR BLOOD OR PROTEIN IN URINE: ICD-10-CM

## 2022-09-14 DIAGNOSIS — Z13.29 SCREENING FOR ENDOCRINE DISORDER: ICD-10-CM

## 2022-09-14 DIAGNOSIS — Z13.6 SCREENING FOR ISCHEMIC HEART DISEASE (IHD): ICD-10-CM

## 2022-09-14 DIAGNOSIS — I10 ESSENTIAL HYPERTENSION: Primary | ICD-10-CM

## 2022-09-14 PROCEDURE — 1159F PR MEDICATION LIST DOCUMENTED IN MEDICAL RECORD: ICD-10-PCS | Mod: CPTII,S$GLB,, | Performed by: FAMILY MEDICINE

## 2022-09-14 PROCEDURE — 3008F PR BODY MASS INDEX (BMI) DOCUMENTED: ICD-10-PCS | Mod: CPTII,S$GLB,, | Performed by: FAMILY MEDICINE

## 2022-09-14 PROCEDURE — 3288F FALL RISK ASSESSMENT DOCD: CPT | Mod: CPTII,S$GLB,, | Performed by: FAMILY MEDICINE

## 2022-09-14 PROCEDURE — 3077F PR MOST RECENT SYSTOLIC BLOOD PRESSURE >= 140 MM HG: ICD-10-PCS | Mod: CPTII,S$GLB,, | Performed by: FAMILY MEDICINE

## 2022-09-14 PROCEDURE — 99214 OFFICE O/P EST MOD 30 MIN: CPT | Mod: S$GLB,,, | Performed by: FAMILY MEDICINE

## 2022-09-14 PROCEDURE — 3061F PR NEG MICROALBUMINURIA RESULT DOCUMENTED/REVIEW: ICD-10-PCS | Mod: CPTII,S$GLB,, | Performed by: FAMILY MEDICINE

## 2022-09-14 PROCEDURE — 3008F BODY MASS INDEX DOCD: CPT | Mod: CPTII,S$GLB,, | Performed by: FAMILY MEDICINE

## 2022-09-14 PROCEDURE — 3288F PR FALLS RISK ASSESSMENT DOCUMENTED: ICD-10-PCS | Mod: CPTII,S$GLB,, | Performed by: FAMILY MEDICINE

## 2022-09-14 PROCEDURE — 1160F RVW MEDS BY RX/DR IN RCRD: CPT | Mod: CPTII,S$GLB,, | Performed by: FAMILY MEDICINE

## 2022-09-14 PROCEDURE — 3078F DIAST BP <80 MM HG: CPT | Mod: CPTII,S$GLB,, | Performed by: FAMILY MEDICINE

## 2022-09-14 PROCEDURE — 3061F NEG MICROALBUMINURIA REV: CPT | Mod: CPTII,S$GLB,, | Performed by: FAMILY MEDICINE

## 2022-09-14 PROCEDURE — 1160F PR REVIEW ALL MEDS BY PRESCRIBER/CLIN PHARMACIST DOCUMENTED: ICD-10-PCS | Mod: CPTII,S$GLB,, | Performed by: FAMILY MEDICINE

## 2022-09-14 PROCEDURE — 1101F PR PT FALLS ASSESS DOC 0-1 FALLS W/OUT INJ PAST YR: ICD-10-PCS | Mod: CPTII,S$GLB,, | Performed by: FAMILY MEDICINE

## 2022-09-14 PROCEDURE — 3066F PR DOCUMENTATION OF TREATMENT FOR NEPHROPATHY: ICD-10-PCS | Mod: CPTII,S$GLB,, | Performed by: FAMILY MEDICINE

## 2022-09-14 PROCEDURE — 3077F SYST BP >= 140 MM HG: CPT | Mod: CPTII,S$GLB,, | Performed by: FAMILY MEDICINE

## 2022-09-14 PROCEDURE — 99214 PR OFFICE/OUTPT VISIT, EST, LEVL IV, 30-39 MIN: ICD-10-PCS | Mod: S$GLB,,, | Performed by: FAMILY MEDICINE

## 2022-09-14 PROCEDURE — 1159F MED LIST DOCD IN RCRD: CPT | Mod: CPTII,S$GLB,, | Performed by: FAMILY MEDICINE

## 2022-09-14 PROCEDURE — 3066F NEPHROPATHY DOC TX: CPT | Mod: CPTII,S$GLB,, | Performed by: FAMILY MEDICINE

## 2022-09-14 PROCEDURE — 3078F PR MOST RECENT DIASTOLIC BLOOD PRESSURE < 80 MM HG: ICD-10-PCS | Mod: CPTII,S$GLB,, | Performed by: FAMILY MEDICINE

## 2022-09-14 PROCEDURE — 1101F PT FALLS ASSESS-DOCD LE1/YR: CPT | Mod: CPTII,S$GLB,, | Performed by: FAMILY MEDICINE

## 2022-09-14 RX ORDER — HYDROCHLOROTHIAZIDE 12.5 MG/1
12.5 TABLET ORAL DAILY
Qty: 90 TABLET | Refills: 1 | Status: SHIPPED | OUTPATIENT
Start: 2022-09-14 | End: 2023-03-13 | Stop reason: SDUPTHER

## 2022-09-14 RX ORDER — AZELASTINE 1 MG/ML
1 SPRAY, METERED NASAL 2 TIMES DAILY
Qty: 30 ML | Refills: 5 | Status: SHIPPED | OUTPATIENT
Start: 2022-09-14 | End: 2023-09-11 | Stop reason: SDUPTHER

## 2022-09-14 RX ORDER — RISEDRONATE SODIUM 35 MG/1
35 TABLET, FILM COATED ORAL
Qty: 12 TABLET | Refills: 1 | Status: SHIPPED | OUTPATIENT
Start: 2022-09-14 | End: 2023-03-13 | Stop reason: SDUPTHER

## 2022-09-14 NOTE — PROGRESS NOTES
SUBJECTIVE:    Patient ID: Shahana Mcknight is a 72 y.o. female.    Chief Complaint: Hypertension (Brought bottles//needs refills//bs)    Pt here to checkup on acute and chronic conditions (HTN, Osteoporosis, Menopause, GERD, and hyperlipidemia, allergic rhinitis)    BP is elevated today. Denies CP/SOB/HA.  Had to put her 15 y/o cat to sleep this week. Also had to deal with her tires this morning. ([place])    Continues to tolerate risendronate once a week for osteoporosis. (intolerance to Boniva, jaw problems and N/V)   Also taking calcium and vitamin D as well.        No longer having hot flashes. No longer taking estradiol.    Denies heartburn.  Has not had to use rolaids or tums.    Allergies have done well this year.     Left knee pain/arthritis (Dr. Allen) is starting to bother her.  Thinks she may need some injections soon. Walks every morning.    No recent labs.     Having issues with post void urinary incontinence from time to time. Not daily. It is enough to wet her underwear.  -----------------------------------------------------  Continues to see Dr. Reza for her glaucoma.  Mammogram was 5/2022, to repeat in 6/2023. DEXA 3/2022, improved.  Cscope scheduled for 5/2020 with Dr. Almeida        No visits with results within 6 Month(s) from this visit.   Latest known visit with results is:   Office Visit on 09/14/2021   Component Date Value Ref Range Status    Glucose 02/22/2022 95  65 - 99 mg/dL Final    BUN 02/22/2022 17  7 - 25 mg/dL Final    Creatinine 02/22/2022 0.77  0.60 - 0.93 mg/dL Final    eGFR if non African American 02/22/2022 77  > OR = 60 mL/min/1.73m2 Final    eGFR if African American 02/22/2022 89  > OR = 60 mL/min/1.73m2 Final    BUN/Creatinine Ratio 02/22/2022 NOT APPLICABLE  6 - 22 (calc) Final    Sodium 02/22/2022 136  135 - 146 mmol/L Final    Potassium 02/22/2022 4.1  3.5 - 5.3 mmol/L Final    Chloride 02/22/2022 101  98 - 110 mmol/L Final    CO2 02/22/2022 28  20 - 32 mmol/L  Final    Calcium 02/22/2022 9.8  8.6 - 10.4 mg/dL Final    Total Protein 02/22/2022 6.9  6.1 - 8.1 g/dL Final    Albumin 02/22/2022 4.4  3.6 - 5.1 g/dL Final    Globulin, Total 02/22/2022 2.5  1.9 - 3.7 g/dL (calc) Final    Albumin/Globulin Ratio 02/22/2022 1.8  1.0 - 2.5 (calc) Final    Total Bilirubin 02/22/2022 0.5  0.2 - 1.2 mg/dL Final    Alkaline Phosphatase 02/22/2022 55  37 - 153 U/L Final    AST 02/22/2022 17  10 - 35 U/L Final    ALT 02/22/2022 22  6 - 29 U/L Final    Cholesterol 02/22/2022 158  <200 mg/dL Final    HDL 02/22/2022 70  > OR = 50 mg/dL Final    Triglycerides 02/22/2022 132  <150 mg/dL Final    LDL Cholesterol 02/22/2022 66  mg/dL (calc) Final    HDL/Cholesterol Ratio 02/22/2022 2.3  <5.0 (calc) Final    Non HDL Chol. (LDL+VLDL) 02/22/2022 88  <130 mg/dL (calc) Final    Creatinine, Urine 02/22/2022 90  20 - 275 mg/dL Final    Microalb, Ur 02/22/2022 1.4  See Note: mg/dL Final    Microalb/Creat Ratio 02/22/2022 16  <30 mcg/mg creat Final    Color, UA 02/22/2022 YELLOW  YELLOW Final    Appearance, UA 02/22/2022 CLOUDY (A)  CLEAR Final    Specific Gravity, UA 02/22/2022 1.015  1.001 - 1.035 Final    pH, UA 02/22/2022 8.0  5.0 - 8.0 Final    Glucose, UA 02/22/2022 NEGATIVE  NEGATIVE Final    Bilirubin, UA 02/22/2022 NEGATIVE  NEGATIVE Final    Ketones, UA 02/22/2022 NEGATIVE  NEGATIVE Final    Occult Blood UA 02/22/2022 NEGATIVE  NEGATIVE Final    Protein, UA 02/22/2022 NEGATIVE  NEGATIVE Final    Nitrite, UA 02/22/2022 NEGATIVE  NEGATIVE Final    Leukocytes, UA 02/22/2022 TRACE (A)  NEGATIVE Final    WBC Casts, UA 02/22/2022 6-10 (A)  < OR = 5 /HPF Final    RBC Casts, UA 02/22/2022 0-2  < OR = 2 /HPF Final    Squam Epithel, UA 02/22/2022 10-20 (A)  < OR = 5 /HPF Final    Bacteria, UA 02/22/2022 FEW (A)  NONE SEEN /HPF Final    Amorphous, UA 02/22/2022 MODERATE (A)  NONE OR FEW /HPF Final    Hyaline Casts, UA 02/22/2022 NONE SEEN  NONE SEEN /LPF Final    Reflexive Urine Culture 02/22/2022    Final     Urine Culture, Routine 02/22/2022    Final    TSH w/reflex to FT4 02/22/2022 3.41  0.40 - 4.50 mIU/L Final    WBC 02/22/2022 5.2  3.8 - 10.8 Thousand/uL Final    RBC 02/22/2022 5.08  3.80 - 5.10 Million/uL Final    Hemoglobin 02/22/2022 14.7  11.7 - 15.5 g/dL Final    Hematocrit 02/22/2022 44.7  35.0 - 45.0 % Final    MCV 02/22/2022 88.0  80.0 - 100.0 fL Final    MCH 02/22/2022 28.9  27.0 - 33.0 pg Final    MCHC 02/22/2022 32.9  32.0 - 36.0 g/dL Final    RDW 02/22/2022 12.2  11.0 - 15.0 % Final    Platelets 02/22/2022 344  140 - 400 Thousand/uL Final    MPV 02/22/2022 10.8  7.5 - 12.5 fL Final    Neutrophils, Abs 02/22/2022 2,657  1,500 - 7,800 cells/uL Final    Lymph # 02/22/2022 1,706  850 - 3,900 cells/uL Final    Mono # 02/22/2022 447  200 - 950 cells/uL Final    Eos # 02/22/2022 338  15 - 500 cells/uL Final    Baso # 02/22/2022 52  0 - 200 cells/uL Final    Neutrophils Relative 02/22/2022 51.1  % Final    Lymph % 02/22/2022 32.8  % Final    Mono % 02/22/2022 8.6  % Final    Eosinophil % 02/22/2022 6.5  % Final    Basophil % 02/22/2022 1.0  % Final       Past Medical History:   Diagnosis Date    Basal cell carcinoma 2005    R upper cut lip    Hyperlipidemia     Osteopenia      Social History     Socioeconomic History    Marital status:    Tobacco Use    Smoking status: Former    Smokeless tobacco: Never   Substance and Sexual Activity    Alcohol use: Yes     Comment: socially    Drug use: No    Sexual activity: Yes     Partners: Male     Social Determinants of Health     Financial Resource Strain: Low Risk     Difficulty of Paying Living Expenses: Not hard at all   Food Insecurity: No Food Insecurity    Worried About Running Out of Food in the Last Year: Never true    Ran Out of Food in the Last Year: Never true   Transportation Needs: No Transportation Needs    Lack of Transportation (Medical): No    Lack of Transportation (Non-Medical): No   Physical Activity: Sufficiently Active    Days of Exercise  per Week: 5 days    Minutes of Exercise per Session: 50 min   Stress: No Stress Concern Present    Feeling of Stress : Only a little   Social Connections: Unknown    Frequency of Communication with Friends and Family: More than three times a week    Frequency of Social Gatherings with Friends and Family: More than three times a week    Active Member of Clubs or Organizations: Patient refused    Attends Club or Organization Meetings: Patient refused    Marital Status:    Housing Stability: Low Risk     Unable to Pay for Housing in the Last Year: No    Number of Places Lived in the Last Year: 1    Unstable Housing in the Last Year: No     Past Surgical History:   Procedure Laterality Date    basil cell carcinoma removal      BREAST CYST ASPIRATION      HYSTERECTOMY      SINUS SURGERY       Family History   Problem Relation Age of Onset    Cancer Mother     Melanoma Neg Hx     Psoriasis Neg Hx     Lupus Neg Hx     Eczema Neg Hx        Review of patient's allergies indicates:   Allergen Reactions    Alendronic acid     Boniva [ibandronate]      Nausea and vomitting    Fosamax [alendronate] Nausea And Vomiting    Hydrocodone-acetaminophen     Hydrocodone-cpm-pseudoephed     Lovastatin     Oxycodone     Rosuvastatin        Current Outpatient Medications:     latanoprost, PF, 0.005 % Drop, Apply to eye., Disp: , Rfl:     LIVALO 4 mg Tab, Take 1 tablet by mouth once daily. For 30 days, Disp: , Rfl:     azelastine (ASTELIN) 137 mcg (0.1 %) nasal spray, 1 spray (137 mcg total) by Nasal route 2 (two) times daily., Disp: 30 mL, Rfl: 5    hydroCHLOROthiazide (HYDRODIURIL) 12.5 MG Tab, Take 1 tablet (12.5 mg total) by mouth once daily., Disp: 90 tablet, Rfl: 1    risedronate (ACTONEL) 35 MG tablet, Take 1 tablet (35 mg total) by mouth every 7 days., Disp: 12 tablet, Rfl: 1    Review of Systems   Constitutional:  Negative for appetite change, fatigue, fever and unexpected weight change.   HENT:  Negative for ear pain,  postnasal drip, sinus pain and sore throat.    Respiratory:  Negative for cough, shortness of breath and wheezing.    Cardiovascular:  Negative for chest pain and leg swelling.   Gastrointestinal:  Negative for abdominal pain, constipation, nausea and vomiting.        -heartburn   Genitourinary:  Negative for difficulty urinating, dysuria and frequency.        +urinary incontinence   Musculoskeletal:  Negative for back pain, myalgias and neck pain.   Skin:  Negative for rash.   Neurological:  Negative for weakness, numbness and headaches.   Hematological:  Does not bruise/bleed easily.   Psychiatric/Behavioral:  Negative for dysphoric mood, sleep disturbance and suicidal ideas. The patient is not nervous/anxious.         Objective:      Vitals:    09/14/22 0759 09/14/22 0841   BP: (!) 160/70 (!) 148/78   Pulse: 66    Weight: 64.4 kg (142 lb)    Height: 5' (1.524 m)      Physical Exam  Vitals reviewed.   Constitutional:       General: She is not in acute distress.     Appearance: Normal appearance. She is well-developed and overweight.   HENT:      Head: Normocephalic and atraumatic.   Neck:      Thyroid: No thyromegaly.   Cardiovascular:      Rate and Rhythm: Normal rate and regular rhythm.      Heart sounds: Normal heart sounds. No murmur heard.    No friction rub.   Pulmonary:      Effort: Pulmonary effort is normal.      Breath sounds: Normal breath sounds. No wheezing or rales.   Abdominal:      General: Bowel sounds are normal. There is no distension.      Palpations: Abdomen is soft.      Tenderness: There is no abdominal tenderness.   Musculoskeletal:      Cervical back: Neck supple.   Lymphadenopathy:      Cervical: No cervical adenopathy.   Skin:     General: Skin is warm and dry.      Findings: No rash.   Neurological:      Mental Status: She is alert and oriented to person, place, and time.   Psychiatric:         Attention and Perception: She is attentive.         Speech: Speech normal.         Behavior:  Behavior normal.         Thought Content: Thought content normal.         Judgment: Judgment normal.         Assessment:       1. Essential hypertension    2. Age-related osteoporosis without current pathological fracture    3. Chronic rhinitis    4. Gastroesophageal reflux disease without esophagitis    5. Other urinary incontinence         Plan:       Essential hypertension  Comments:  Controlled. Will continue to monitor BP on current medication regimen  Orders:  -     hydroCHLOROthiazide (HYDRODIURIL) 12.5 MG Tab; Take 1 tablet (12.5 mg total) by mouth once daily.  Dispense: 90 tablet; Refill: 1    Age-related osteoporosis without current pathological fracture  Comments:  Chronic. Improved. To continue to take risendronate.   Orders:  -     risedronate (ACTONEL) 35 MG tablet; Take 1 tablet (35 mg total) by mouth every 7 days.  Dispense: 12 tablet; Refill: 1    Chronic rhinitis  Comments:  Controlled. Will continue to monitor symptoms  Orders:  -     azelastine (ASTELIN) 137 mcg (0.1 %) nasal spray; 1 spray (137 mcg total) by Nasal route 2 (two) times daily.  Dispense: 30 mL; Refill: 5    Gastroesophageal reflux disease without esophagitis  Comments:  Controlled. Will continue to monitor symptoms.    Other urinary incontinence  Comments:  Symptomatic. Will get PVR  Orders:  -      Bladder Post Void Residual; Future; Expected date: 09/14/2022    Labs and/or tests have been ordered for the evaluation/monitoring of acute/chronic conditions, to be done just before next visit.    Follow up in about 6 months (around 3/14/2023) for HTN, Bone, Allergies, GERD.        9/29/2022 Jeanmarie Yoder

## 2022-10-04 ENCOUNTER — TELEPHONE (OUTPATIENT)
Dept: FAMILY MEDICINE | Facility: CLINIC | Age: 72
End: 2022-10-04

## 2022-10-04 NOTE — TELEPHONE ENCOUNTER
----- Message from Jeanmarie Yoder MD sent at 9/29/2022  9:38 PM CDT -----  Let pt know that I added her yearly labs to her chart to be done just before her visit in 6 months

## 2022-10-05 ENCOUNTER — HOSPITAL ENCOUNTER (OUTPATIENT)
Dept: RADIOLOGY | Facility: HOSPITAL | Age: 72
Discharge: HOME | End: 2022-10-05
Attending: FAMILY MEDICINE
Payer: MEDICARE

## 2022-10-05 DIAGNOSIS — N39.498 OTHER URINARY INCONTINENCE: ICD-10-CM

## 2022-10-05 PROCEDURE — 76857 US EXAM PELVIC LIMITED: CPT | Mod: TC,PO

## 2022-10-12 ENCOUNTER — OFFICE VISIT (OUTPATIENT)
Dept: DERMATOLOGY | Facility: CLINIC | Age: 72
End: 2022-10-12
Payer: MEDICARE

## 2022-10-12 VITALS — WEIGHT: 142 LBS | HEIGHT: 60 IN | BODY MASS INDEX: 27.88 KG/M2

## 2022-10-12 DIAGNOSIS — L90.5 SCAR: ICD-10-CM

## 2022-10-12 DIAGNOSIS — L82.0 INFLAMED SEBORRHEIC KERATOSIS: ICD-10-CM

## 2022-10-12 DIAGNOSIS — Z85.828 HISTORY OF NONMELANOMA SKIN CANCER: ICD-10-CM

## 2022-10-12 DIAGNOSIS — D22.9 MULTIPLE BENIGN NEVI: ICD-10-CM

## 2022-10-12 DIAGNOSIS — L57.0 ACTINIC KERATOSES: Primary | ICD-10-CM

## 2022-10-12 DIAGNOSIS — L82.1 SEBORRHEIC KERATOSES: ICD-10-CM

## 2022-10-12 DIAGNOSIS — L81.4 SOLAR LENTIGO: ICD-10-CM

## 2022-10-12 DIAGNOSIS — D18.01 CHERRY ANGIOMA: ICD-10-CM

## 2022-10-12 PROCEDURE — 3066F PR DOCUMENTATION OF TREATMENT FOR NEPHROPATHY: ICD-10-PCS | Mod: CPTII,S$GLB,, | Performed by: DERMATOLOGY

## 2022-10-12 PROCEDURE — 99213 PR OFFICE/OUTPT VISIT, EST, LEVL III, 20-29 MIN: ICD-10-PCS | Mod: 25,S$GLB,, | Performed by: DERMATOLOGY

## 2022-10-12 PROCEDURE — 3288F FALL RISK ASSESSMENT DOCD: CPT | Mod: CPTII,S$GLB,, | Performed by: DERMATOLOGY

## 2022-10-12 PROCEDURE — 1160F RVW MEDS BY RX/DR IN RCRD: CPT | Mod: CPTII,S$GLB,, | Performed by: DERMATOLOGY

## 2022-10-12 PROCEDURE — 1126F AMNT PAIN NOTED NONE PRSNT: CPT | Mod: CPTII,S$GLB,, | Performed by: DERMATOLOGY

## 2022-10-12 PROCEDURE — 17003 DESTRUCT PREMALG LES 2-14: CPT | Mod: XS,S$GLB,, | Performed by: DERMATOLOGY

## 2022-10-12 PROCEDURE — 99999 PR PBB SHADOW E&M-EST. PATIENT-LVL III: ICD-10-PCS | Mod: PBBFAC,,, | Performed by: DERMATOLOGY

## 2022-10-12 PROCEDURE — 1101F PT FALLS ASSESS-DOCD LE1/YR: CPT | Mod: CPTII,S$GLB,, | Performed by: DERMATOLOGY

## 2022-10-12 PROCEDURE — 1101F PR PT FALLS ASSESS DOC 0-1 FALLS W/OUT INJ PAST YR: ICD-10-PCS | Mod: CPTII,S$GLB,, | Performed by: DERMATOLOGY

## 2022-10-12 PROCEDURE — 17000 PR DESTRUCTION(LASER SURGERY,CRYOSURGERY,CHEMOSURGERY),PREMALIGNANT LESIONS,FIRST LESION: ICD-10-PCS | Mod: XS,S$GLB,, | Performed by: DERMATOLOGY

## 2022-10-12 PROCEDURE — 1159F MED LIST DOCD IN RCRD: CPT | Mod: CPTII,S$GLB,, | Performed by: DERMATOLOGY

## 2022-10-12 PROCEDURE — 3008F PR BODY MASS INDEX (BMI) DOCUMENTED: ICD-10-PCS | Mod: CPTII,S$GLB,, | Performed by: DERMATOLOGY

## 2022-10-12 PROCEDURE — 17110 DESTRUCTION B9 LES UP TO 14: CPT | Mod: S$GLB,,, | Performed by: DERMATOLOGY

## 2022-10-12 PROCEDURE — 99999 PR PBB SHADOW E&M-EST. PATIENT-LVL III: CPT | Mod: PBBFAC,,, | Performed by: DERMATOLOGY

## 2022-10-12 PROCEDURE — 17000 DESTRUCT PREMALG LESION: CPT | Mod: XS,S$GLB,, | Performed by: DERMATOLOGY

## 2022-10-12 PROCEDURE — 1160F PR REVIEW ALL MEDS BY PRESCRIBER/CLIN PHARMACIST DOCUMENTED: ICD-10-PCS | Mod: CPTII,S$GLB,, | Performed by: DERMATOLOGY

## 2022-10-12 PROCEDURE — 17003 DESTRUCTION, PREMALIGNANT LESIONS; SECOND THROUGH 14 LESIONS: ICD-10-PCS | Mod: XS,S$GLB,, | Performed by: DERMATOLOGY

## 2022-10-12 PROCEDURE — 17110 PR DESTRUCTION BENIGN LESIONS UP TO 14: ICD-10-PCS | Mod: S$GLB,,, | Performed by: DERMATOLOGY

## 2022-10-12 PROCEDURE — 3288F PR FALLS RISK ASSESSMENT DOCUMENTED: ICD-10-PCS | Mod: CPTII,S$GLB,, | Performed by: DERMATOLOGY

## 2022-10-12 PROCEDURE — 1159F PR MEDICATION LIST DOCUMENTED IN MEDICAL RECORD: ICD-10-PCS | Mod: CPTII,S$GLB,, | Performed by: DERMATOLOGY

## 2022-10-12 PROCEDURE — 3061F PR NEG MICROALBUMINURIA RESULT DOCUMENTED/REVIEW: ICD-10-PCS | Mod: CPTII,S$GLB,, | Performed by: DERMATOLOGY

## 2022-10-12 PROCEDURE — 3008F BODY MASS INDEX DOCD: CPT | Mod: CPTII,S$GLB,, | Performed by: DERMATOLOGY

## 2022-10-12 PROCEDURE — 3061F NEG MICROALBUMINURIA REV: CPT | Mod: CPTII,S$GLB,, | Performed by: DERMATOLOGY

## 2022-10-12 PROCEDURE — 99213 OFFICE O/P EST LOW 20 MIN: CPT | Mod: 25,S$GLB,, | Performed by: DERMATOLOGY

## 2022-10-12 PROCEDURE — 3066F NEPHROPATHY DOC TX: CPT | Mod: CPTII,S$GLB,, | Performed by: DERMATOLOGY

## 2022-10-12 PROCEDURE — 1126F PR PAIN SEVERITY QUANTIFIED, NO PAIN PRESENT: ICD-10-PCS | Mod: CPTII,S$GLB,, | Performed by: DERMATOLOGY

## 2022-10-12 NOTE — PATIENT INSTRUCTIONS

## 2022-10-12 NOTE — PROGRESS NOTES
Subjective:       Patient ID:  Shahana Mcknight is a 72 y.o. female who presents for   Chief Complaint   Patient presents with    Skin Check     TBSE    Spot     Left temple, left neck, back     LOV: 10/25/21 - AK, nevi, SK, lentigo, angioma, h/o NMSC    Skin Check - TBSE    C/o spot on left temple  Rough, frozen before    C/o spot on left neck  Rubs on shirt collar, gets irritated    C/o spot on the back  Itchy, can't really see it    Derm hx:   Hx of BCC x's 2 on face within six months of each other treated with [facility] (Dr. Martinez) and plastics to closed next day Nov 2005- Venkatesh Montero   Hx of BCC right leg treated with surgery by derm in [place] about 6 years ago.    Current Outpatient Medications:   ·  azelastine (ASTELIN) 137 mcg (0.1 %) nasal spray, 1 spray (137 mcg total) by Nasal route 2 (two) times daily., Disp: 30 mL, Rfl: 5  ·  hydroCHLOROthiazide (HYDRODIURIL) 12.5 MG Tab, Take 1 tablet (12.5 mg total) by mouth once daily., Disp: 90 tablet, Rfl: 1  ·  latanoprost, PF, 0.005 % Drop, Apply to eye., Disp: , Rfl:   ·  LIVALO 4 mg Tab, Take 1 tablet by mouth once daily. For 30 days, Disp: , Rfl:   ·  risedronate (ACTONEL) 35 MG tablet, Take 1 tablet (35 mg total) by mouth every 7 days., Disp: 12 tablet, Rfl: 1         Review of Systems   Constitutional:  Negative for fever, chills and fatigue.   Respiratory:  Negative for cough and shortness of breath.    Skin:  Positive for dry skin, daily sunscreen use, activity-related sunscreen use and wears hat. Negative for itching and rash.      Objective:    Physical Exam   Constitutional: She appears well-developed and well-nourished. No distress.   Eyes: Lids are normal.  No conjunctival no injection.   Cardiovascular:  There is no local extremity swelling and no dependent edema.             Neurological: She is alert and oriented to person, place, and time. She is not disoriented.   Psychiatric: She has a normal mood and affect.   Skin:   Areas Examined  (abnormalities noted in diagram):   Scalp / Hair Palpated and Inspected  Head / Face Inspection Performed  Neck Inspection Performed  Chest / Axilla Inspection Performed  Abdomen Inspection Performed  Genitals / Buttocks / Groin Inspection Performed  Back Inspection Performed  RUE Inspected  LUE Inspection Performed  RLE Inspected  LLE Inspection Performed  Nails and Digits Inspection Performed                         Diagram Legend     Erythematous scaling macule/papule c/w actinic keratosis       Vascular papule c/w angioma      Pigmented verrucoid papule/plaque c/w seborrheic keratosis      Yellow umbilicated papule c/w sebaceous hyperplasia      Irregularly shaped tan macule c/w lentigo     1-2 mm smooth white papules consistent with Milia      Movable subcutaneous cyst with punctum c/w epidermal inclusion cyst      Subcutaneous movable cyst c/w pilar cyst      Firm pink to brown papule c/w dermatofibroma      Pedunculated fleshy papule(s) c/w skin tag(s)      Evenly pigmented macule c/w junctional nevus     Mildly variegated pigmented, slightly irregular-bordered macule c/w mildly atypical nevus      Flesh colored to evenly pigmented papule c/w intradermal nevus       Pink pearly papule/plaque c/w basal cell carcinoma      Erythematous hyperkeratotic cursted plaque c/w SCC      Surgical scar with no sign of skin cancer recurrence      Open and closed comedones      Inflammatory papules and pustules      Verrucoid papule consistent consistent with wart     Erythematous eczematous patches and plaques     Dystrophic onycholytic nail with subungual debris c/w onychomycosis     Umbilicated papule    Erythematous-base heme-crusted tan verrucoid plaque consistent with inflamed seborrheic keratosis     Erythematous Silvery Scaling Plaque c/w Psoriasis     See annotation      Assessment / Plan:        Actinic keratoses  Cryosurgery Procedure Note    Verbal consent from the patient is obtained and the patient is aware of  the precancerous quality and need for treatment of these lesions. Liquid nitrogen cryosurgery is applied to the 2 actinic keratoses, as detailed in the physical exam, to produce a freeze injury. The patient is aware that blisters may form and is instructed on wound care with gentle cleansing and use of vaseline ointment to keep moist until healed. The patient is supplied a handout on cryosurgery and is instructed to call if lesions do not completely resolve.    ISK  Cryosurgery procedure note:    Verbal consent from the patient is obtained. Liquid nitrogen cryosurgery is applied to 2 lesions to produce a freeze injury. The patient is aware that blisters may form and is instructed on wound care with gentle cleansing and use of vaseline ointment to keep moist until healed. The patient is supplied a handout on cryosurgery and is instructed to call if lesions do not completely resolve.    Seborrheic keratoses  These are benign inherited growths without a malignant potential. Reassurance given to patient. No treatment is necessary.     History of nonmelanoma skin cancer  Scar  Area of previous NMSCs examined. Sites well healed with no signs of recurrence.    Total body skin examination performed today including at least 12 points as noted in physical examination. No lesions suspicious for malignancy noted.    Solar lentigo  This is a benign hyperpigmented sun induced lesion. Daily sun protection will reduce the number of new lesions. Treatment of these benign lesions are considered cosmetic.    Multiple benign nevi  Careful dermoscopy evaluation of nevi performed with none identified as needing biopsy today  Monitor for new mole or moles that are becoming bigger, darker, irritated, or developing irregular borders.     Cherry angioma  This is a benign vascular lesion. Reassurance given. No treatment required.     Patient instructed in importance in daily broad spectrum sun protection of at least spf 30. Mineral sunscreen  ingredients preferred (Zinc +/- Titanium) and can be found OTC.   Recommend Elta MD for daily use on face and neck.  Patient encouraged to wear hat for all outdoor exposure.   Also discussed sun avoidance and use of protective clothing.           Follow up in about 1 year (around 10/12/2023), or if symptoms worsen or fail to improve.

## 2022-11-03 ENCOUNTER — TELEPHONE (OUTPATIENT)
Dept: FAMILY MEDICINE | Facility: CLINIC | Age: 72
End: 2022-11-03

## 2022-11-03 NOTE — TELEPHONE ENCOUNTER
----- Message from Jeanne Beaulieu sent at 11/3/2022 10:47 AM CDT -----  Patient called and stated that she had a bladder ultrasound done awhile back and she has not gotten a call with the results please give her a call at [phone]

## 2022-11-03 NOTE — TELEPHONE ENCOUNTER
No significant Post void residual. I do not believe urology would do anything. She may want to wear a panty pad

## 2023-01-23 DIAGNOSIS — M25.561 ACUTE PAIN OF RIGHT KNEE: Primary | ICD-10-CM

## 2023-01-24 ENCOUNTER — OFFICE VISIT (OUTPATIENT)
Dept: ORTHOPEDICS | Facility: CLINIC | Age: 73
End: 2023-01-24
Payer: MEDICARE

## 2023-01-24 ENCOUNTER — HOSPITAL ENCOUNTER (OUTPATIENT)
Dept: RADIOLOGY | Facility: HOSPITAL | Age: 73
Discharge: HOME | End: 2023-01-24
Attending: ORTHOPAEDIC SURGERY
Payer: MEDICARE

## 2023-01-24 VITALS — WEIGHT: 142 LBS | BODY MASS INDEX: 27.88 KG/M2 | HEIGHT: 60 IN

## 2023-01-24 DIAGNOSIS — M17.11 PRIMARY OSTEOARTHRITIS OF RIGHT KNEE: Primary | ICD-10-CM

## 2023-01-24 DIAGNOSIS — M25.561 ACUTE PAIN OF RIGHT KNEE: ICD-10-CM

## 2023-01-24 PROCEDURE — 20610 LARGE JOINT ASPIRATION/INJECTION: R KNEE: ICD-10-PCS | Mod: RT,S$GLB,, | Performed by: ORTHOPAEDIC SURGERY

## 2023-01-24 PROCEDURE — 1159F MED LIST DOCD IN RCRD: CPT | Mod: CPTII,S$GLB,, | Performed by: ORTHOPAEDIC SURGERY

## 2023-01-24 PROCEDURE — 73562 XR KNEE ORTHO RIGHT: ICD-10-PCS | Mod: 26,RT,, | Performed by: RADIOLOGY

## 2023-01-24 PROCEDURE — 99213 PR OFFICE/OUTPT VISIT, EST, LEVL III, 20-29 MIN: ICD-10-PCS | Mod: 25,S$GLB,, | Performed by: ORTHOPAEDIC SURGERY

## 2023-01-24 PROCEDURE — 1101F PR PT FALLS ASSESS DOC 0-1 FALLS W/OUT INJ PAST YR: ICD-10-PCS | Mod: CPTII,S$GLB,, | Performed by: ORTHOPAEDIC SURGERY

## 2023-01-24 PROCEDURE — 99213 OFFICE O/P EST LOW 20 MIN: CPT | Mod: 25,S$GLB,, | Performed by: ORTHOPAEDIC SURGERY

## 2023-01-24 PROCEDURE — 1101F PT FALLS ASSESS-DOCD LE1/YR: CPT | Mod: CPTII,S$GLB,, | Performed by: ORTHOPAEDIC SURGERY

## 2023-01-24 PROCEDURE — 73560 X-RAY EXAM OF KNEE 1 OR 2: CPT | Mod: 26,LT,, | Performed by: RADIOLOGY

## 2023-01-24 PROCEDURE — 3288F FALL RISK ASSESSMENT DOCD: CPT | Mod: CPTII,S$GLB,, | Performed by: ORTHOPAEDIC SURGERY

## 2023-01-24 PROCEDURE — 3008F BODY MASS INDEX DOCD: CPT | Mod: CPTII,S$GLB,, | Performed by: ORTHOPAEDIC SURGERY

## 2023-01-24 PROCEDURE — 1159F PR MEDICATION LIST DOCUMENTED IN MEDICAL RECORD: ICD-10-PCS | Mod: CPTII,S$GLB,, | Performed by: ORTHOPAEDIC SURGERY

## 2023-01-24 PROCEDURE — 1125F AMNT PAIN NOTED PAIN PRSNT: CPT | Mod: CPTII,S$GLB,, | Performed by: ORTHOPAEDIC SURGERY

## 2023-01-24 PROCEDURE — 73560 XR KNEE ORTHO RIGHT: ICD-10-PCS | Mod: 26,LT,, | Performed by: RADIOLOGY

## 2023-01-24 PROCEDURE — 3008F PR BODY MASS INDEX (BMI) DOCUMENTED: ICD-10-PCS | Mod: CPTII,S$GLB,, | Performed by: ORTHOPAEDIC SURGERY

## 2023-01-24 PROCEDURE — 73560 X-RAY EXAM OF KNEE 1 OR 2: CPT | Mod: TC,PN,LT

## 2023-01-24 PROCEDURE — 99999 PR PBB SHADOW E&M-EST. PATIENT-LVL III: ICD-10-PCS | Mod: PBBFAC,,, | Performed by: ORTHOPAEDIC SURGERY

## 2023-01-24 PROCEDURE — 3288F PR FALLS RISK ASSESSMENT DOCUMENTED: ICD-10-PCS | Mod: CPTII,S$GLB,, | Performed by: ORTHOPAEDIC SURGERY

## 2023-01-24 PROCEDURE — 1125F PR PAIN SEVERITY QUANTIFIED, PAIN PRESENT: ICD-10-PCS | Mod: CPTII,S$GLB,, | Performed by: ORTHOPAEDIC SURGERY

## 2023-01-24 PROCEDURE — 73562 X-RAY EXAM OF KNEE 3: CPT | Mod: 26,RT,, | Performed by: RADIOLOGY

## 2023-01-24 PROCEDURE — 20610 DRAIN/INJ JOINT/BURSA W/O US: CPT | Mod: RT,S$GLB,, | Performed by: ORTHOPAEDIC SURGERY

## 2023-01-24 PROCEDURE — 99999 PR PBB SHADOW E&M-EST. PATIENT-LVL III: CPT | Mod: PBBFAC,,, | Performed by: ORTHOPAEDIC SURGERY

## 2023-01-24 RX ORDER — TRIAMCINOLONE ACETONIDE 40 MG/ML
60 INJECTION, SUSPENSION INTRA-ARTICULAR; INTRAMUSCULAR
Status: DISCONTINUED | OUTPATIENT
Start: 2023-01-24 | End: 2023-01-24 | Stop reason: HOSPADM

## 2023-01-24 RX ORDER — LATANOPROST 50 UG/ML
1 SOLUTION/ DROPS OPHTHALMIC NIGHTLY
COMMUNITY
Start: 2022-11-02 | End: 2023-09-11

## 2023-01-24 RX ADMIN — TRIAMCINOLONE ACETONIDE 60 MG: 40 INJECTION, SUSPENSION INTRA-ARTICULAR; INTRAMUSCULAR at 10:01

## 2023-01-24 NOTE — PROCEDURES
Large Joint Aspiration/Injection: R knee    Date/Time: 1/24/2023 10:15 AM  Performed by: Matthew Allen MD  Authorized by: Matthew Allen MD     Indications:  Arthritis and pain  Local anesthetic:  Lidocaine 1% without epinephrine    Details:  Needle Size:  20 G  Approach:  Lateral  Location:  Knee  Site:  R knee  Medications:  60 mg triamcinolone acetonide 40 mg/mL

## 2023-01-24 NOTE — PROGRESS NOTES
1/24/2023    Past Medical History:   Diagnosis Date    Basal cell carcinoma 2005    R upper cut lip    Hyperlipidemia     Osteopenia        Past Surgical History:   Procedure Laterality Date    basil cell carcinoma removal      BREAST CYST ASPIRATION      HYSTERECTOMY      SINUS SURGERY         Current Outpatient Medications   Medication Sig    azelastine (ASTELIN) 137 mcg (0.1 %) nasal spray 1 spray (137 mcg total) by Nasal route 2 (two) times daily.    hydroCHLOROthiazide (HYDRODIURIL) 12.5 MG Tab Take 1 tablet (12.5 mg total) by mouth once daily.    latanoprost 0.005 % ophthalmic solution Place 1 drop into both eyes every evening.    latanoprost, PF, 0.005 % Drop Apply to eye.    LIVALO 4 mg Tab Take 1 tablet by mouth once daily. For 30 days    risedronate (ACTONEL) 35 MG tablet Take 1 tablet (35 mg total) by mouth every 7 days.     No current facility-administered medications for this visit.       Review of patient's allergies indicates:   Allergen Reactions    Alendronic acid     Boniva [ibandronate]      Nausea and vomitting    Fosamax [alendronate] Nausea And Vomiting    Hydrocodone-acetaminophen     Hydrocodone-cpm-pseudoephed     Lovastatin     Oxycodone     Rosuvastatin        Family History   Problem Relation Age of Onset    Cancer Mother     Melanoma Neg Hx     Psoriasis Neg Hx     Lupus Neg Hx     Eczema Neg Hx        Social History     Socioeconomic History    Marital status:    Tobacco Use    Smoking status: Former    Smokeless tobacco: Never   Substance and Sexual Activity    Alcohol use: Yes     Comment: socially    Drug use: No    Sexual activity: Yes     Partners: Male     Social Determinants of Health     Financial Resource Strain: Low Risk     Difficulty of Paying Living Expenses: Not hard at all   Food Insecurity: No Food Insecurity    Worried About Running Out of Food in the Last Year: Never true    Ran Out of Food in the Last Year: Never true   Transportation Needs: No Transportation  Needs    Lack of Transportation (Medical): No    Lack of Transportation (Non-Medical): No   Physical Activity: Sufficiently Active    Days of Exercise per Week: 5 days    Minutes of Exercise per Session: 50 min   Stress: No Stress Concern Present    Feeling of Stress : Only a little   Social Connections: Unknown    Frequency of Communication with Friends and Family: More than three times a week    Frequency of Social Gatherings with Friends and Family: More than three times a week    Active Member of Clubs or Organizations: Patient refused    Attends Club or Organization Meetings: Patient refused    Marital Status:    Housing Stability: Low Risk     Unable to Pay for Housing in the Last Year: No    Number of Places Lived in the Last Year: 1    Unstable Housing in the Last Year: No       Chief Complaint:   Chief Complaint   Patient presents with    Right Knee - Pain, Initial Visit     Right Knee Pain for many years. She has been seen for knee pain by you at the other office. She states weight bearing increases pain and she would like to discuss TX options. NO Injury.          History of present illness:    This is a 72 y.o. year old female who complains of patient is being seen today with a complaint of right knee pain which she is been having for many years she is been seen in the past by me she complains of increased pain with ambulation has no recent injury    Review of Systems:    Constitution: Denies chills, fever, and sweats.  HENT: Denies headaches or blurry vision.  Cardiovascular: Denies chest pain or irregular heart beat.  Respiratory: Denies cough or shortness of breath.  Gastrointestinal: Denies abdominal pain, nausea, or vomiting.  Musculoskeletal:  Denies muscle cramps.  Neurological: Denies dizziness or focal weakness.  Psychiatric/Behavioral: Normal mental status.  Hematologic/Lymphatic: Denies bleeding problem or easy bruising/bleeding.  Skin: Denies rash or suspicious  lesions.    Examination:    Vital Signs:    Vitals:    01/24/23 1007   Weight: 64.4 kg (141 lb 15.6 oz)   Height: 5' (1.524 m)   PainSc:   6   PainLoc: Knee       Body mass index is 27.73 kg/m².    This a well-developed, well nourished patient in no acute distress.    Alert and oriented x 3 and cooperative to examination.       Physical Exam:  Right hip-patient has good range of motion of the right hip with no groin pain elicited        Right knee-patient has medial joint line pain slight effusion good range of motion    Imaging:  X-ray of the right knee show severe bone-on-bone involvement of the medial compartment consistent with severe osteoarthritis       Assessment: Primary osteoarthritis of right knee        Plan:  Patient is having increasing pain in the right knee which is affecting her lifestyle she can not do the walking that she wants could.  She is not set on a total knee replacement at this time she would like to go ahead and have a cortisone injection and then will follow this up in a few weeks with a Monovisc injection to the right knee      DISCLAIMER: This note may have been dictated using voice recognition software and may contain grammatical errors.     NOTE: Consult report sent to referring provider via [facility] EMR.  Answers submitted by the patient for this visit:  Orthopedics Questionnaire (Submitted on 1/22/2023)  unexpected weight change: No  appetite change : No  sleep disturbance: No  IMMUNOCOMPROMISED: No  nervous/ anxious: No  dysphoric mood: No  rash: No  visual disturbance: No  eye redness: No  eye pain: No  ear pain: No  tinnitus: No  hearing loss: No  sinus pressure : No  nosebleeds: No  food allergies: No  cough: No  shortness of breath: No  sweating: No  dysuria: No  frequency: No  difficulty urinating: No  hematuria: No  painful intercourse: No  chest pain: No  palpitations: No  nausea: No  vomiting: No  diarrhea: No  blood in stool: No  constipation: No  headaches: No  dizziness:  No  numbness: No  seizures: No  joint swelling: No  myalgia: No  weakness: No  back pain: No   (Submitted on 1/22/2023)  Chief Complaint: Leg pain  Pain Chronicity: recurrent  History of trauma: No  Onset: more than 1 year ago  Frequency: daily  Progression since onset: gradually worsening  injury location: at home  pain- numeric: 5/10  pain location: right knee  pain quality: generalized  Radiating Pain: No  Aggravating factors: activity, exercise, walking  fever: No  inability to bear weight: Yes  itching: No  joint locking: No  limited range of motion: Yes  stiffness: Yes  tingling: No  Treatments tried: brace/corset, cold, heat, exercise, NSAIDs, OTC ointments, OTC pain meds, rest  physical therapy: not tried  Improvement on treatment: no relief

## 2023-02-14 ENCOUNTER — OFFICE VISIT (OUTPATIENT)
Dept: ORTHOPEDICS | Facility: CLINIC | Age: 73
End: 2023-02-14
Payer: MEDICARE

## 2023-02-14 VITALS — HEIGHT: 60 IN | WEIGHT: 142 LBS | BODY MASS INDEX: 27.88 KG/M2

## 2023-02-14 DIAGNOSIS — M17.11 PRIMARY OSTEOARTHRITIS OF RIGHT KNEE: Primary | ICD-10-CM

## 2023-02-14 PROCEDURE — 20610 LARGE JOINT ASPIRATION/INJECTION: R KNEE: ICD-10-PCS | Mod: RT,S$GLB,, | Performed by: ORTHOPAEDIC SURGERY

## 2023-02-14 PROCEDURE — 3008F PR BODY MASS INDEX (BMI) DOCUMENTED: ICD-10-PCS | Mod: CPTII,S$GLB,, | Performed by: ORTHOPAEDIC SURGERY

## 2023-02-14 PROCEDURE — 3288F PR FALLS RISK ASSESSMENT DOCUMENTED: ICD-10-PCS | Mod: CPTII,S$GLB,, | Performed by: ORTHOPAEDIC SURGERY

## 2023-02-14 PROCEDURE — 99499 UNLISTED E&M SERVICE: CPT | Mod: S$GLB,,, | Performed by: ORTHOPAEDIC SURGERY

## 2023-02-14 PROCEDURE — 1101F PR PT FALLS ASSESS DOC 0-1 FALLS W/OUT INJ PAST YR: ICD-10-PCS | Mod: CPTII,S$GLB,, | Performed by: ORTHOPAEDIC SURGERY

## 2023-02-14 PROCEDURE — 99999 PR PBB SHADOW E&M-EST. PATIENT-LVL II: ICD-10-PCS | Mod: PBBFAC,,, | Performed by: ORTHOPAEDIC SURGERY

## 2023-02-14 PROCEDURE — 1125F AMNT PAIN NOTED PAIN PRSNT: CPT | Mod: CPTII,S$GLB,, | Performed by: ORTHOPAEDIC SURGERY

## 2023-02-14 PROCEDURE — 3288F FALL RISK ASSESSMENT DOCD: CPT | Mod: CPTII,S$GLB,, | Performed by: ORTHOPAEDIC SURGERY

## 2023-02-14 PROCEDURE — 1125F PR PAIN SEVERITY QUANTIFIED, PAIN PRESENT: ICD-10-PCS | Mod: CPTII,S$GLB,, | Performed by: ORTHOPAEDIC SURGERY

## 2023-02-14 PROCEDURE — 99999 PR PBB SHADOW E&M-EST. PATIENT-LVL II: CPT | Mod: PBBFAC,,, | Performed by: ORTHOPAEDIC SURGERY

## 2023-02-14 PROCEDURE — 1159F MED LIST DOCD IN RCRD: CPT | Mod: CPTII,S$GLB,, | Performed by: ORTHOPAEDIC SURGERY

## 2023-02-14 PROCEDURE — 3008F BODY MASS INDEX DOCD: CPT | Mod: CPTII,S$GLB,, | Performed by: ORTHOPAEDIC SURGERY

## 2023-02-14 PROCEDURE — 1159F PR MEDICATION LIST DOCUMENTED IN MEDICAL RECORD: ICD-10-PCS | Mod: CPTII,S$GLB,, | Performed by: ORTHOPAEDIC SURGERY

## 2023-02-14 PROCEDURE — 20610 DRAIN/INJ JOINT/BURSA W/O US: CPT | Mod: RT,S$GLB,, | Performed by: ORTHOPAEDIC SURGERY

## 2023-02-14 PROCEDURE — 1101F PT FALLS ASSESS-DOCD LE1/YR: CPT | Mod: CPTII,S$GLB,, | Performed by: ORTHOPAEDIC SURGERY

## 2023-02-14 PROCEDURE — 99499 NO LOS: ICD-10-PCS | Mod: S$GLB,,, | Performed by: ORTHOPAEDIC SURGERY

## 2023-02-14 NOTE — PROGRESS NOTES
2/14/2023    Past Medical History:   Diagnosis Date    Basal cell carcinoma 2005    R upper cut lip    Hyperlipidemia     Osteopenia        Past Surgical History:   Procedure Laterality Date    basil cell carcinoma removal      BREAST CYST ASPIRATION      HYSTERECTOMY      SINUS SURGERY         Current Outpatient Medications   Medication Sig    azelastine (ASTELIN) 137 mcg (0.1 %) nasal spray 1 spray (137 mcg total) by Nasal route 2 (two) times daily.    hydroCHLOROthiazide (HYDRODIURIL) 12.5 MG Tab Take 1 tablet (12.5 mg total) by mouth once daily.    latanoprost 0.005 % ophthalmic solution Place 1 drop into both eyes every evening.    latanoprost, PF, 0.005 % Drop Apply to eye.    LIVALO 4 mg Tab Take 1 tablet by mouth once daily. For 30 days    risedronate (ACTONEL) 35 MG tablet Take 1 tablet (35 mg total) by mouth every 7 days.     No current facility-administered medications for this visit.       Review of patient's allergies indicates:   Allergen Reactions    Alendronic acid     Boniva [ibandronate]      Nausea and vomitting    Fosamax [alendronate] Nausea And Vomiting    Hydrocodone-acetaminophen     Hydrocodone-cpm-pseudoephed     Lovastatin     Oxycodone     Rosuvastatin        Family History   Problem Relation Age of Onset    Cancer Mother     Melanoma Neg Hx     Psoriasis Neg Hx     Lupus Neg Hx     Eczema Neg Hx        Social History     Socioeconomic History    Marital status:    Tobacco Use    Smoking status: Former    Smokeless tobacco: Never   Substance and Sexual Activity    Alcohol use: Yes     Comment: socially    Drug use: No    Sexual activity: Yes     Partners: Male     Social Determinants of Health     Financial Resource Strain: Low Risk     Difficulty of Paying Living Expenses: Not hard at all   Food Insecurity: No Food Insecurity    Worried About Running Out of Food in the Last Year: Never true    Ran Out of Food in the Last Year: Never true   Transportation Needs: No Transportation  Needs    Lack of Transportation (Medical): No    Lack of Transportation (Non-Medical): No   Physical Activity: Sufficiently Active    Days of Exercise per Week: 5 days    Minutes of Exercise per Session: 50 min   Stress: No Stress Concern Present    Feeling of Stress : Only a little   Social Connections: Unknown    Frequency of Communication with Friends and Family: More than three times a week    Frequency of Social Gatherings with Friends and Family: More than three times a week    Active Member of Clubs or Organizations: Patient refused    Attends Club or Organization Meetings: Patient refused    Marital Status:    Housing Stability: Low Risk     Unable to Pay for Housing in the Last Year: No    Number of Places Lived in the Last Year: 1    Unstable Housing in the Last Year: No       Chief Complaint:   Chief Complaint   Patient presents with    Right Knee - Injections     Synvisc-One to the Right knee         History of present illness:    This is a 72 y.o. year old female who complains of     Review of Systems:    Constitution: Denies chills, fever, and sweats.  HENT: Denies headaches or blurry vision.  Cardiovascular: Denies chest pain or irregular heart beat.  Respiratory: Denies cough or shortness of breath.  Gastrointestinal: Denies abdominal pain, nausea, or vomiting.  Musculoskeletal:  Denies muscle cramps.  Neurological: Denies dizziness or focal weakness.  Psychiatric/Behavioral: Normal mental status.  Hematologic/Lymphatic: Denies bleeding problem or easy bruising/bleeding.  Skin: Denies rash or suspicious lesions.    Examination:    Vital Signs:    Vitals:    02/14/23 1045   Weight: 64.4 kg (141 lb 15.6 oz)   Height: 5' (1.524 m)   PainSc:   2   PainLoc: Knee       Body mass index is 27.73 kg/m².    This a well-developed, well nourished patient in no acute distress.    Alert and oriented x 3 and cooperative to examination.       Physical Exam:     Imaging:        Assessment: Primary  osteoarthritis of right knee        Plan:        DISCLAIMER: This note may have been dictated using voice recognition software and may contain grammatical errors.     NOTE: Consult report sent to referring provider via [facility] EMR.2/14/2023    Past Medical History:   Diagnosis Date    Basal cell carcinoma 2005    R upper cut lip    Hyperlipidemia     Osteopenia        Past Surgical History:   Procedure Laterality Date    basil cell carcinoma removal      BREAST CYST ASPIRATION      HYSTERECTOMY      SINUS SURGERY         Current Outpatient Medications   Medication Sig    azelastine (ASTELIN) 137 mcg (0.1 %) nasal spray 1 spray (137 mcg total) by Nasal route 2 (two) times daily.    hydroCHLOROthiazide (HYDRODIURIL) 12.5 MG Tab Take 1 tablet (12.5 mg total) by mouth once daily.    latanoprost 0.005 % ophthalmic solution Place 1 drop into both eyes every evening.    latanoprost, PF, 0.005 % Drop Apply to eye.    LIVALO 4 mg Tab Take 1 tablet by mouth once daily. For 30 days    risedronate (ACTONEL) 35 MG tablet Take 1 tablet (35 mg total) by mouth every 7 days.     No current facility-administered medications for this visit.       Review of patient's allergies indicates:   Allergen Reactions    Alendronic acid     Boniva [ibandronate]      Nausea and vomitting    Fosamax [alendronate] Nausea And Vomiting    Hydrocodone-acetaminophen     Hydrocodone-cpm-pseudoephed     Lovastatin     Oxycodone     Rosuvastatin        Family History   Problem Relation Age of Onset    Cancer Mother     Melanoma Neg Hx     Psoriasis Neg Hx     Lupus Neg Hx     Eczema Neg Hx        Social History     Socioeconomic History    Marital status:    Tobacco Use    Smoking status: Former    Smokeless tobacco: Never   Substance and Sexual Activity    Alcohol use: Yes     Comment: socially    Drug use: No    Sexual activity: Yes     Partners: Male     Social Determinants of Health     Financial Resource Strain: Low Risk     Difficulty of Paying  Living Expenses: Not hard at all   Food Insecurity: No Food Insecurity    Worried About Running Out of Food in the Last Year: Never true    Ran Out of Food in the Last Year: Never true   Transportation Needs: No Transportation Needs    Lack of Transportation (Medical): No    Lack of Transportation (Non-Medical): No   Physical Activity: Sufficiently Active    Days of Exercise per Week: 5 days    Minutes of Exercise per Session: 50 min   Stress: No Stress Concern Present    Feeling of Stress : Only a little   Social Connections: Unknown    Frequency of Communication with Friends and Family: More than three times a week    Frequency of Social Gatherings with Friends and Family: More than three times a week    Active Member of Clubs or Organizations: Patient refused    Attends Club or Organization Meetings: Patient refused    Marital Status:    Housing Stability: Low Risk     Unable to Pay for Housing in the Last Year: No    Number of Places Lived in the Last Year: 1    Unstable Housing in the Last Year: No       Chief Complaint:   Chief Complaint   Patient presents with    Right Knee - Injections     Synvisc-One to the Right knee         History of present illness:    This is a 72 y.o. year old female who complains of     Review of Systems:    Constitution: Denies chills, fever, and sweats.  HENT: Denies headaches or blurry vision.  Cardiovascular: Denies chest pain or irregular heart beat.  Respiratory: Denies cough or shortness of breath.  Gastrointestinal: Denies abdominal pain, nausea, or vomiting.  Musculoskeletal:  Denies muscle cramps.  Neurological: Denies dizziness or focal weakness.  Psychiatric/Behavioral: Normal mental status.  Hematologic/Lymphatic: Denies bleeding problem or easy bruising/bleeding.  Skin: Denies rash or suspicious lesions.    Examination:    Vital Signs:    Vitals:    02/14/23 1045   Weight: 64.4 kg (141 lb 15.6 oz)   Height: 5' (1.524 m)   PainSc:   2   PainLoc: Knee       Body  mass index is 27.73 kg/m².    This a well-developed, well nourished patient in no acute distress.    Alert and oriented x 3 and cooperative to examination.       Physical Exam:     Imaging:        Assessment: Primary osteoarthritis of right knee        Plan:        DISCLAIMER: This note may have been dictated using voice recognition software and may contain grammatical errors.     NOTE: Consult report sent to referring provider via [facility] E[facility].2/14/2023    Past Medical History:   Diagnosis Date    Basal cell carcinoma 2005    R upper cut lip    Hyperlipidemia     Osteopenia        Past Surgical History:   Procedure Laterality Date    basil cell carcinoma removal      BREAST CYST ASPIRATION      HYSTERECTOMY      SINUS SURGERY         Current Outpatient Medications   Medication Sig    azelastine (ASTELIN) 137 mcg (0.1 %) nasal spray 1 spray (137 mcg total) by Nasal route 2 (two) times daily.    hydroCHLOROthiazide (HYDRODIURIL) 12.5 MG Tab Take 1 tablet (12.5 mg total) by mouth once daily.    latanoprost 0.005 % ophthalmic solution Place 1 drop into both eyes every evening.    latanoprost, PF, 0.005 % Drop Apply to eye.    LIVALO 4 mg Tab Take 1 tablet by mouth once daily. For 30 days    risedronate (ACTONEL) 35 MG tablet Take 1 tablet (35 mg total) by mouth every 7 days.     No current facility-administered medications for this visit.       Review of patient's allergies indicates:   Allergen Reactions    Alendronic acid     Boniva [ibandronate]      Nausea and vomitting    Fosamax [alendronate] Nausea And Vomiting    Hydrocodone-acetaminophen     Hydrocodone-cpm-pseudoephed     Lovastatin     Oxycodone     Rosuvastatin        Family History   Problem Relation Age of Onset    Cancer Mother     Melanoma Neg Hx     Psoriasis Neg Hx     Lupus Neg Hx     Eczema Neg Hx        Social History     Socioeconomic History    Marital status:    Tobacco Use    Smoking status: Former    Smokeless tobacco: Never   Substance and  Sexual Activity    Alcohol use: Yes     Comment: socially    Drug use: No    Sexual activity: Yes     Partners: Male     Social Determinants of Health     Financial Resource Strain: Low Risk     Difficulty of Paying Living Expenses: Not hard at all   Food Insecurity: No Food Insecurity    Worried About Running Out of Food in the Last Year: Never true    Ran Out of Food in the Last Year: Never true   Transportation Needs: No Transportation Needs    Lack of Transportation (Medical): No    Lack of Transportation (Non-Medical): No   Physical Activity: Sufficiently Active    Days of Exercise per Week: 5 days    Minutes of Exercise per Session: 50 min   Stress: No Stress Concern Present    Feeling of Stress : Only a little   Social Connections: Unknown    Frequency of Communication with Friends and Family: More than three times a week    Frequency of Social Gatherings with Friends and Family: More than three times a week    Active Member of Clubs or Organizations: Patient refused    Attends Club or Organization Meetings: Patient refused    Marital Status:    Housing Stability: Low Risk     Unable to Pay for Housing in the Last Year: No    Number of Places Lived in the Last Year: 1    Unstable Housing in the Last Year: No       Chief Complaint:   Chief Complaint   Patient presents with    Right Knee - Injections     Synvisc-One to the Right knee         History of present illness:    This is a 72 y.o. year old female who complains of patient is here for a Synvisc-One injection to the right knee for osteoarthritis    Review of Systems:    Constitution: Denies chills, fever, and sweats.  HENT: Denies headaches or blurry vision.  Cardiovascular: Denies chest pain or irregular heart beat.  Respiratory: Denies cough or shortness of breath.  Gastrointestinal: Denies abdominal pain, nausea, or vomiting.  Musculoskeletal:  Denies muscle cramps.  Neurological: Denies dizziness or focal weakness.  Psychiatric/Behavioral:  Normal mental status.  Hematologic/Lymphatic: Denies bleeding problem or easy bruising/bleeding.  Skin: Denies rash or suspicious lesions.    Examination:    Vital Signs:    Vitals:    02/14/23 1045   Weight: 64.4 kg (141 lb 15.6 oz)   Height: 5' (1.524 m)   PainSc:   2   PainLoc: Knee       Body mass index is 27.73 kg/m².    This a well-developed, well nourished patient in no acute distress.    Alert and oriented x 3 and cooperative to examination.       Physical Exam:  Right knee-no warmth the cellulitis    Imaging:  No x-ray       Assessment: Primary osteoarthritis of right knee        Plan:  We will inject the right knee with Synvisc 1 today      DISCLAIMER: This note may have been dictated using voice recognition software and may contain grammatical errors.     NOTE: Consult report sent to referring provider via [facility] EMR.

## 2023-02-14 NOTE — PROCEDURES
Large Joint Aspiration/Injection: R knee    Date/Time: 2/14/2023 10:45 AM  Performed by: Matthew Allen MD  Authorized by: Matthew Allen MD     Indications:  Arthritis and pain    Details:  Needle Size:  18 G  Approach:  Lateral  Location:  Knee  Site:  R knee  Medications:  48 mg hylan g-f 20 48 mg/6 mL

## 2023-03-01 ENCOUNTER — TELEPHONE (OUTPATIENT)
Dept: FAMILY MEDICINE | Facility: CLINIC | Age: 73
End: 2023-03-01

## 2023-03-01 NOTE — TELEPHONE ENCOUNTER
Spoke to patient that fasting lab and urine are due a week prior to visit. Said she has this on her calender and will go soon. Encouraged water. Updated remind me.

## 2023-03-01 NOTE — TELEPHONE ENCOUNTER
----- Message from RT Puma sent at 10/4/2022 10:28 AM CDT -----  Regarding: Lab due prior to visit    ----- Message from Jeanmarie Yoder MD sent at 9/29/2022  9:38 PM CDT -----  Let pt know that I added her yearly labs to her chart to be done just before her visit in 6 months

## 2023-03-07 LAB
ALBUMIN SERPL-MCNC: 4.5 G/DL (ref 3.6–5.1)
ALBUMIN/CREAT UR: 8 MCG/MG CREAT
ALBUMIN/GLOB SERPL: 1.8 (CALC) (ref 1–2.5)
ALP SERPL-CCNC: 55 U/L (ref 37–153)
ALT SERPL-CCNC: 19 U/L (ref 6–29)
APPEARANCE UR: CLEAR
AST SERPL-CCNC: 17 U/L (ref 10–35)
BACTERIA #/AREA URNS HPF: NORMAL /HPF
BACTERIA UR CULT: NORMAL
BASOPHILS # BLD AUTO: 52 CELLS/UL (ref 0–200)
BASOPHILS NFR BLD AUTO: 1 %
BILIRUB SERPL-MCNC: 0.5 MG/DL (ref 0.2–1.2)
BILIRUB UR QL STRIP: NEGATIVE
BUN SERPL-MCNC: 18 MG/DL (ref 7–25)
BUN/CREAT SERPL: NORMAL (CALC) (ref 6–22)
CALCIUM SERPL-MCNC: 9.9 MG/DL (ref 8.6–10.4)
CHLORIDE SERPL-SCNC: 103 MMOL/L (ref 98–110)
CHOLEST SERPL-MCNC: 142 MG/DL
CHOLEST/HDLC SERPL: 2.3 (CALC)
CO2 SERPL-SCNC: 29 MMOL/L (ref 20–32)
COLOR UR: YELLOW
CREAT SERPL-MCNC: 0.78 MG/DL (ref 0.6–1)
CREAT UR-MCNC: 120 MG/DL (ref 20–275)
EGFR: 80 ML/MIN/1.73M2
EOSINOPHIL # BLD AUTO: 161 CELLS/UL (ref 15–500)
EOSINOPHIL NFR BLD AUTO: 3.1 %
ERYTHROCYTE [DISTWIDTH] IN BLOOD BY AUTOMATED COUNT: 12.1 % (ref 11–15)
GLOBULIN SER CALC-MCNC: 2.5 G/DL (CALC) (ref 1.9–3.7)
GLUCOSE SERPL-MCNC: 89 MG/DL (ref 65–99)
GLUCOSE UR QL STRIP: NEGATIVE
HBA1C MFR BLD: 5.3 % OF TOTAL HGB
HCT VFR BLD AUTO: 44.5 % (ref 35–45)
HDLC SERPL-MCNC: 63 MG/DL
HGB BLD-MCNC: 14.7 G/DL (ref 11.7–15.5)
HGB UR QL STRIP: NEGATIVE
HYALINE CASTS #/AREA URNS LPF: NORMAL /LPF
KETONES UR QL STRIP: NEGATIVE
LDLC SERPL CALC-MCNC: 65 MG/DL (CALC)
LEUKOCYTE ESTERASE UR QL STRIP: NEGATIVE
LYMPHOCYTES # BLD AUTO: 1768 CELLS/UL (ref 850–3900)
LYMPHOCYTES NFR BLD AUTO: 34 %
MCH RBC QN AUTO: 29.3 PG (ref 27–33)
MCHC RBC AUTO-ENTMCNC: 33 G/DL (ref 32–36)
MCV RBC AUTO: 88.8 FL (ref 80–100)
MICROALBUMIN UR-MCNC: 0.9 MG/DL
MONOCYTES # BLD AUTO: 536 CELLS/UL (ref 200–950)
MONOCYTES NFR BLD AUTO: 10.3 %
NEUTROPHILS # BLD AUTO: 2683 CELLS/UL (ref 1500–7800)
NEUTROPHILS NFR BLD AUTO: 51.6 %
NITRITE UR QL STRIP: NEGATIVE
NONHDLC SERPL-MCNC: 79 MG/DL (CALC)
PH UR STRIP: 6.5 [PH] (ref 5–8)
PLATELET # BLD AUTO: 330 THOUSAND/UL (ref 140–400)
PMV BLD REES-ECKER: 11.7 FL (ref 7.5–12.5)
POTASSIUM SERPL-SCNC: 4.1 MMOL/L (ref 3.5–5.3)
PROT SERPL-MCNC: 7 G/DL (ref 6.1–8.1)
PROT UR QL STRIP: NEGATIVE
RBC # BLD AUTO: 5.01 MILLION/UL (ref 3.8–5.1)
RBC #/AREA URNS HPF: NORMAL /HPF
SERVICE CMNT-IMP: NORMAL
SODIUM SERPL-SCNC: 142 MMOL/L (ref 135–146)
SP GR UR STRIP: 1.01 (ref 1–1.03)
SQUAMOUS #/AREA URNS HPF: NORMAL /HPF
TRIGL SERPL-MCNC: 67 MG/DL
TSH SERPL-ACNC: 1.87 MIU/L (ref 0.4–4.5)
WBC # BLD AUTO: 5.2 THOUSAND/UL (ref 3.8–10.8)
WBC #/AREA URNS HPF: NORMAL /HPF

## 2023-03-13 ENCOUNTER — TELEPHONE (OUTPATIENT)
Dept: FAMILY MEDICINE | Facility: CLINIC | Age: 73
End: 2023-03-13

## 2023-03-13 ENCOUNTER — OFFICE VISIT (OUTPATIENT)
Dept: FAMILY MEDICINE | Facility: CLINIC | Age: 73
End: 2023-03-13
Payer: MEDICARE

## 2023-03-13 VITALS
BODY MASS INDEX: 29.45 KG/M2 | OXYGEN SATURATION: 95 % | DIASTOLIC BLOOD PRESSURE: 68 MMHG | WEIGHT: 150 LBS | HEART RATE: 68 BPM | HEIGHT: 60 IN | SYSTOLIC BLOOD PRESSURE: 136 MMHG

## 2023-03-13 DIAGNOSIS — M81.0 AGE-RELATED OSTEOPOROSIS WITHOUT CURRENT PATHOLOGICAL FRACTURE: ICD-10-CM

## 2023-03-13 DIAGNOSIS — Z12.31 SCREENING MAMMOGRAM, ENCOUNTER FOR: ICD-10-CM

## 2023-03-13 DIAGNOSIS — N39.41 URGE INCONTINENCE: ICD-10-CM

## 2023-03-13 DIAGNOSIS — I10 ESSENTIAL HYPERTENSION: Primary | ICD-10-CM

## 2023-03-13 PROCEDURE — 3075F SYST BP GE 130 - 139MM HG: CPT | Mod: CPTII,S$GLB,, | Performed by: FAMILY MEDICINE

## 2023-03-13 PROCEDURE — 1160F RVW MEDS BY RX/DR IN RCRD: CPT | Mod: CPTII,S$GLB,, | Performed by: FAMILY MEDICINE

## 2023-03-13 PROCEDURE — 3066F NEPHROPATHY DOC TX: CPT | Mod: CPTII,S$GLB,, | Performed by: FAMILY MEDICINE

## 2023-03-13 PROCEDURE — 3061F NEG MICROALBUMINURIA REV: CPT | Mod: CPTII,S$GLB,, | Performed by: FAMILY MEDICINE

## 2023-03-13 PROCEDURE — 99214 OFFICE O/P EST MOD 30 MIN: CPT | Mod: S$GLB,,, | Performed by: FAMILY MEDICINE

## 2023-03-13 PROCEDURE — 1159F MED LIST DOCD IN RCRD: CPT | Mod: CPTII,S$GLB,, | Performed by: FAMILY MEDICINE

## 2023-03-13 PROCEDURE — 3066F PR DOCUMENTATION OF TREATMENT FOR NEPHROPATHY: ICD-10-PCS | Mod: CPTII,S$GLB,, | Performed by: FAMILY MEDICINE

## 2023-03-13 PROCEDURE — 3075F PR MOST RECENT SYSTOLIC BLOOD PRESS GE 130-139MM HG: ICD-10-PCS | Mod: CPTII,S$GLB,, | Performed by: FAMILY MEDICINE

## 2023-03-13 PROCEDURE — 1101F PT FALLS ASSESS-DOCD LE1/YR: CPT | Mod: CPTII,S$GLB,, | Performed by: FAMILY MEDICINE

## 2023-03-13 PROCEDURE — 1159F PR MEDICATION LIST DOCUMENTED IN MEDICAL RECORD: ICD-10-PCS | Mod: CPTII,S$GLB,, | Performed by: FAMILY MEDICINE

## 2023-03-13 PROCEDURE — 3044F HG A1C LEVEL LT 7.0%: CPT | Mod: CPTII,S$GLB,, | Performed by: FAMILY MEDICINE

## 2023-03-13 PROCEDURE — 1101F PR PT FALLS ASSESS DOC 0-1 FALLS W/OUT INJ PAST YR: ICD-10-PCS | Mod: CPTII,S$GLB,, | Performed by: FAMILY MEDICINE

## 2023-03-13 PROCEDURE — 1160F PR REVIEW ALL MEDS BY PRESCRIBER/CLIN PHARMACIST DOCUMENTED: ICD-10-PCS | Mod: CPTII,S$GLB,, | Performed by: FAMILY MEDICINE

## 2023-03-13 PROCEDURE — 3078F DIAST BP <80 MM HG: CPT | Mod: CPTII,S$GLB,, | Performed by: FAMILY MEDICINE

## 2023-03-13 PROCEDURE — 3288F PR FALLS RISK ASSESSMENT DOCUMENTED: ICD-10-PCS | Mod: CPTII,S$GLB,, | Performed by: FAMILY MEDICINE

## 2023-03-13 PROCEDURE — 3008F PR BODY MASS INDEX (BMI) DOCUMENTED: ICD-10-PCS | Mod: CPTII,S$GLB,, | Performed by: FAMILY MEDICINE

## 2023-03-13 PROCEDURE — 99214 PR OFFICE/OUTPT VISIT, EST, LEVL IV, 30-39 MIN: ICD-10-PCS | Mod: S$GLB,,, | Performed by: FAMILY MEDICINE

## 2023-03-13 PROCEDURE — 3044F PR MOST RECENT HEMOGLOBIN A1C LEVEL <7.0%: ICD-10-PCS | Mod: CPTII,S$GLB,, | Performed by: FAMILY MEDICINE

## 2023-03-13 PROCEDURE — 3288F FALL RISK ASSESSMENT DOCD: CPT | Mod: CPTII,S$GLB,, | Performed by: FAMILY MEDICINE

## 2023-03-13 PROCEDURE — 3008F BODY MASS INDEX DOCD: CPT | Mod: CPTII,S$GLB,, | Performed by: FAMILY MEDICINE

## 2023-03-13 PROCEDURE — 3078F PR MOST RECENT DIASTOLIC BLOOD PRESSURE < 80 MM HG: ICD-10-PCS | Mod: CPTII,S$GLB,, | Performed by: FAMILY MEDICINE

## 2023-03-13 PROCEDURE — 3061F PR NEG MICROALBUMINURIA RESULT DOCUMENTED/REVIEW: ICD-10-PCS | Mod: CPTII,S$GLB,, | Performed by: FAMILY MEDICINE

## 2023-03-13 RX ORDER — HYDROCHLOROTHIAZIDE 12.5 MG/1
12.5 TABLET ORAL DAILY
Qty: 90 TABLET | Refills: 3 | Status: SHIPPED | OUTPATIENT
Start: 2023-03-13

## 2023-03-13 RX ORDER — RISEDRONATE SODIUM 35 MG/1
35 TABLET, FILM COATED ORAL
Qty: 12 TABLET | Refills: 3 | Status: SHIPPED | OUTPATIENT
Start: 2023-03-13 | End: 2024-03-11 | Stop reason: SDUPTHER

## 2023-03-13 RX ORDER — SOLIFENACIN SUCCINATE 10 MG/1
10 TABLET, FILM COATED ORAL DAILY
Qty: 90 TABLET | Refills: 3 | Status: SHIPPED | OUTPATIENT
Start: 2023-03-13 | End: 2023-04-28

## 2023-03-13 NOTE — TELEPHONE ENCOUNTER
----- Message from Jeanmarie Yoder MD sent at 3/13/2023  8:45 AM CDT -----  Please send copy of labs to Dr. Mortensen

## 2023-03-13 NOTE — PROGRESS NOTES
SUBJECTIVE:    Patient ID: Shahana Mcknight is a 72 y.o. female.    Chief Complaint: Follow-up (6mth, brought bottles, Mammo ordered// SW)      Pt here to checkup on acute and chronic conditions (HTN, Osteoporosis, Menopause, GERD, and hyperlipidemia, allergic rhinitis)    BP is doing ok. Denies CP/SOB/ROD.   ([facility]) has appt in June.     Continues to tolerate risendronate once a week for osteoporosis. (intolerance to Boniva, jaw problems and N/V)   Also taking calcium and vitamin D as well.        Not hot flashes. No longer taking estradiol.    Denies heartburn.  Has not had to use rolaids or tums. Watches diet, and avoid triggers    Allergies are doing ok. Has been using astelin and zyrtec which is helping better than claritin.    Left knee pain/arthritis (Dr. Allen) is doing ok. Right knee is bothering her now and has had a gel injection. Has limited activity. Has been biking for 1/2 hour daily as she cant walk anymore. Thinks she may have to have a knee replacement, exploring her options. If she does, would like to robotics.  Not able to walk anymore.     Had labs done: LDL 65, fBS 89, TSH 1.87, A1c 5.3%    Goes to bathroom  a lot during the day. Has some urgency.  It is enough to wet her underwear. Only gets up once during the night. Has tried some otc herbal meds that are not  helping.   -----------------------------------------------------  Continues to see Dr. Reza for her glaucoma.  Mammogram was 5/2022, to repeat in 6/2023. DEXA 3/2022, improved.  Cscope  5/2020 with Dr. Almeida, polyps, to repeat in 5 yrs        Office Visit on 09/14/2022   Component Date Value Ref Range Status    Cholesterol 03/06/2023 142  <200 mg/dL Final    HDL 03/06/2023 63  > OR = 50 mg/dL Final    Triglycerides 03/06/2023 67  <150 mg/dL Final    LDL Cholesterol 03/06/2023 65  mg/dL (calc) Final    HDL/Cholesterol Ratio 03/06/2023 2.3  <5.0 (calc) Final    Non HDL Chol. (LDL+VLDL) 03/06/2023 79  <130 mg/dL (calc) Final     Glucose 03/06/2023 89  65 - 99 mg/dL Final    BUN 03/06/2023 18  7 - 25 mg/dL Final    Creatinine 03/06/2023 0.78  0.60 - 1.00 mg/dL Final    eGFR 03/06/2023 80  > OR = 60 mL/min/1.73m2 Final    BUN/Creatinine Ratio 03/06/2023 NOT APPLICABLE  6 - 22 (calc) Final    Sodium 03/06/2023 142  135 - 146 mmol/L Final    Potassium 03/06/2023 4.1  3.5 - 5.3 mmol/L Final    Chloride 03/06/2023 103  98 - 110 mmol/L Final    CO2 03/06/2023 29  20 - 32 mmol/L Final    Calcium 03/06/2023 9.9  8.6 - 10.4 mg/dL Final    Total Protein 03/06/2023 7.0  6.1 - 8.1 g/dL Final    Albumin 03/06/2023 4.5  3.6 - 5.1 g/dL Final    Globulin, Total 03/06/2023 2.5  1.9 - 3.7 g/dL (calc) Final    Albumin/Globulin Ratio 03/06/2023 1.8  1.0 - 2.5 (calc) Final    Total Bilirubin 03/06/2023 0.5  0.2 - 1.2 mg/dL Final    Alkaline Phosphatase 03/06/2023 55  37 - 153 U/L Final    AST 03/06/2023 17  10 - 35 U/L Final    ALT 03/06/2023 19  6 - 29 U/L Final    Creatinine, Urine 03/06/2023 120  20 - 275 mg/dL Final    Microalb, Ur 03/06/2023 0.9  See Note: mg/dL Final    Microalb/Creat Ratio 03/06/2023 8  <30 mcg/mg creat Final    TSH w/reflex to FT4 03/06/2023 1.87  0.40 - 4.50 mIU/L Final    Color, UA 03/06/2023 YELLOW  YELLOW Final    Appearance, UA 03/06/2023 CLEAR  CLEAR Final    Specific Gravity, UA 03/06/2023 1.015  1.001 - 1.035 Final    pH, UA 03/06/2023 6.5  5.0 - 8.0 Final    Glucose, UA 03/06/2023 NEGATIVE  NEGATIVE Final    Bilirubin, UA 03/06/2023 NEGATIVE  NEGATIVE Final    Ketones, UA 03/06/2023 NEGATIVE  NEGATIVE Final    Occult Blood UA 03/06/2023 NEGATIVE  NEGATIVE Final    Protein, UA 03/06/2023 NEGATIVE  NEGATIVE Final    Nitrite, UA 03/06/2023 NEGATIVE  NEGATIVE Final    Leukocytes, UA 03/06/2023 NEGATIVE  NEGATIVE Final    WBC Casts, UA 03/06/2023 NONE SEEN  < OR = 5 /HPF Final    RBC Casts, UA 03/06/2023 0-2  < OR = 2 /HPF Final    Squam Epithel, UA 03/06/2023 NONE SEEN  < OR = 5 /HPF Final    Bacteria, UA 03/06/2023 NONE SEEN  NONE  SEEN /HPF Final    Hyaline Casts, UA 03/06/2023 NONE SEEN  NONE SEEN /LPF Final    Service Cmt: 03/06/2023    Final    Reflexive Urine Culture 03/06/2023    Final    WBC 03/06/2023 5.2  3.8 - 10.8 Thousand/uL Final    RBC 03/06/2023 5.01  3.80 - 5.10 Million/uL Final    Hemoglobin 03/06/2023 14.7  11.7 - 15.5 g/dL Final    Hematocrit 03/06/2023 44.5  35.0 - 45.0 % Final    MCV 03/06/2023 88.8  80.0 - 100.0 fL Final    MCH 03/06/2023 29.3  27.0 - 33.0 pg Final    MCHC 03/06/2023 33.0  32.0 - 36.0 g/dL Final    RDW 03/06/2023 12.1  11.0 - 15.0 % Final    Platelets 03/06/2023 330  140 - 400 Thousand/uL Final    MPV 03/06/2023 11.7  7.5 - 12.5 fL Final    Neutrophils, Abs 03/06/2023 2,683  1,500 - 7,800 cells/uL Final    Lymph # 03/06/2023 1,768  850 - 3,900 cells/uL Final    Mono # 03/06/2023 536  200 - 950 cells/uL Final    Eos # 03/06/2023 161  15 - 500 cells/uL Final    Baso # 03/06/2023 52  0 - 200 cells/uL Final    Neutrophils Relative 03/06/2023 51.6  % Final    Lymph % 03/06/2023 34.0  % Final    Mono % 03/06/2023 10.3  % Final    Eosinophil % 03/06/2023 3.1  % Final    Basophil % 03/06/2023 1.0  % Final    Hemoglobin A1C 03/06/2023 5.3  <5.7 % of total Hgb Final       Past Medical History:   Diagnosis Date    Basal cell carcinoma 2005    R upper cut lip    Hyperlipidemia     Osteopenia      Social History     Socioeconomic History    Marital status:    Tobacco Use    Smoking status: Former    Smokeless tobacco: Never   Substance and Sexual Activity    Alcohol use: Yes     Comment: socially    Drug use: No    Sexual activity: Yes     Partners: Male     Social Determinants of Health     Financial Resource Strain: Low Risk     Difficulty of Paying Living Expenses: Not hard at all   Food Insecurity: No Food Insecurity    Worried About Running Out of Food in the Last Year: Never true    Ran Out of Food in the Last Year: Never true   Transportation Needs: No Transportation Needs    Lack of Transportation  (Medical): No    Lack of Transportation (Non-Medical): No   Physical Activity: Sufficiently Active    Days of Exercise per Week: 5 days    Minutes of Exercise per Session: 40 min   Stress: No Stress Concern Present    Feeling of Stress : Not at all   Social Connections: Unknown    Frequency of Communication with Friends and Family: Three times a week    Frequency of Social Gatherings with Friends and Family: Twice a week    Active Member of Clubs or Organizations: No    Attends Club or Organization Meetings: Patient refused    Marital Status:    Housing Stability: Low Risk     Unable to Pay for Housing in the Last Year: No    Number of Places Lived in the Last Year: 1    Unstable Housing in the Last Year: No     Past Surgical History:   Procedure Laterality Date    basil cell carcinoma removal      BREAST CYST ASPIRATION      HYSTERECTOMY      SINUS SURGERY       Family History   Problem Relation Age of Onset    Cancer Mother     Melanoma Neg Hx     Psoriasis Neg Hx     Lupus Neg Hx     Eczema Neg Hx        Review of patient's allergies indicates:   Allergen Reactions    Alendronic acid     Boniva [ibandronate]      Nausea and vomitting    Fosamax [alendronate] Nausea And Vomiting    Hydrocodone-acetaminophen     Hydrocodone-cpm-pseudoephed     Lovastatin     Oxycodone     Rosuvastatin        Current Outpatient Medications:     azelastine (ASTELIN) 137 mcg (0.1 %) nasal spray, 1 spray (137 mcg total) by Nasal route 2 (two) times daily., Disp: 30 mL, Rfl: 5    latanoprost 0.005 % ophthalmic solution, Place 1 drop into both eyes every evening., Disp: , Rfl:     latanoprost, PF, 0.005 % Drop, Apply to eye., Disp: , Rfl:     LIVALO 4 mg Tab, Take 1 tablet by mouth once daily. For 30 days, Disp: , Rfl:     hydroCHLOROthiazide (HYDRODIURIL) 12.5 MG Tab, Take 1 tablet (12.5 mg total) by mouth once daily., Disp: 90 tablet, Rfl: 3    risedronate (ACTONEL) 35 MG tablet, Take 1 tablet (35 mg total) by mouth every 7  days., Disp: 12 tablet, Rfl: 3    solifenacin (VESICARE) 10 MG tablet, Take 1 tablet (10 mg total) by mouth once daily., Disp: 90 tablet, Rfl: 3    Review of Systems   Constitutional:  Negative for appetite change, fatigue, fever and unexpected weight change.   HENT:  Negative for ear pain, postnasal drip, sinus pain and sore throat.    Respiratory:  Negative for cough, shortness of breath and wheezing.    Cardiovascular:  Negative for chest pain and leg swelling.   Gastrointestinal:  Negative for abdominal pain, constipation, nausea and vomiting.        -heartburn   Genitourinary:  Negative for difficulty urinating, dysuria and frequency.        +urinary incontinence   Musculoskeletal:  Negative for back pain, myalgias and neck pain.   Skin:  Negative for rash.   Neurological:  Negative for weakness, numbness and headaches.   Hematological:  Does not bruise/bleed easily.   Psychiatric/Behavioral:  Negative for dysphoric mood, sleep disturbance and suicidal ideas. The patient is not nervous/anxious.         Objective:      Vitals:    03/13/23 0741   BP: 136/68   Pulse: 68   SpO2: 95%   Weight: 68 kg (150 lb)   Height: 5' (1.524 m)     Wt Readings from Last 3 Encounters:   03/13/23 68 kg (150 lb)   02/14/23 64.4 kg (141 lb 15.6 oz)   01/24/23 64.4 kg (141 lb 15.6 oz)         Physical Exam  Vitals reviewed.   Constitutional:       General: She is not in acute distress.     Appearance: Normal appearance. She is well-developed and overweight.   HENT:      Head: Normocephalic and atraumatic.   Neck:      Thyroid: No thyromegaly.   Cardiovascular:      Rate and Rhythm: Normal rate and regular rhythm.      Heart sounds: Normal heart sounds. No murmur heard.    No friction rub.   Pulmonary:      Effort: Pulmonary effort is normal.      Breath sounds: Normal breath sounds. No wheezing or rales.   Abdominal:      General: Bowel sounds are normal. There is no distension.      Palpations: Abdomen is soft.      Tenderness: There  is no abdominal tenderness.   Musculoskeletal:      Cervical back: Neck supple.   Lymphadenopathy:      Cervical: No cervical adenopathy.   Skin:     General: Skin is warm and dry.      Findings: No rash.   Neurological:      Mental Status: She is alert and oriented to person, place, and time.   Psychiatric:         Attention and Perception: She is attentive.         Speech: Speech normal.         Behavior: Behavior normal.         Thought Content: Thought content normal.         Judgment: Judgment normal.         Assessment:       1. Essential hypertension    2. Age-related osteoporosis without current pathological fracture    3. Screening mammogram, encounter for    4. Urge incontinence           Plan:       Essential hypertension  Comments:  Controlled. Will continue to monitor BP on current medication regimen  Orders:  -     hydroCHLOROthiazide (HYDRODIURIL) 12.5 MG Tab; Take 1 tablet (12.5 mg total) by mouth once daily.  Dispense: 90 tablet; Refill: 3    Age-related osteoporosis without current pathological fracture  Comments:  Chronic. Improved. To continue to take risendronate.   Orders:  -     risedronate (ACTONEL) 35 MG tablet; Take 1 tablet (35 mg total) by mouth every 7 days.  Dispense: 12 tablet; Refill: 3    Screening mammogram, encounter for  Comments:  Mammogram order sent to [facility]  Orders:  -     Mammo Digital Screening Bilat w/ Sid; Future; Expected date: 05/16/2023    Urge incontinence  Comments:  Chronic. Will try on vesicare  Orders:  -     solifenacin (VESICARE) 10 MG tablet; Take 1 tablet (10 mg total) by mouth once daily.  Dispense: 90 tablet; Refill: 3      Labs and/or tests have been ordered for the evaluation/monitoring of acute/chronic conditions, to be done just before next visit.    Follow up in about 6 months (around 9/13/2023) for HTN, OAB.        3/13/2023 Jeanmarie Yoder

## 2023-03-15 ENCOUNTER — TELEPHONE (OUTPATIENT)
Dept: FAMILY MEDICINE | Facility: CLINIC | Age: 73
End: 2023-03-15

## 2023-03-15 NOTE — TELEPHONE ENCOUNTER
Per Dr Yoder, ok to take as long as she is controlled and make sure to keep her routine eye exams.    Spoke to patient and advised. Patient understood.

## 2023-03-15 NOTE — TELEPHONE ENCOUNTER
----- Message from Chelle Hubbard sent at 3/15/2023  9:16 AM CDT -----  Pt called and stated that when she picked up her VESICARE. The pamphlet states that she should let her PCP know if she has glaucoma or not. And she does and just would like to confirm Dr. Yoder is aware of it. [phone]

## 2023-04-27 DIAGNOSIS — N39.41 URGE INCONTINENCE: Primary | ICD-10-CM

## 2023-04-27 NOTE — TELEPHONE ENCOUNTER
----- Message from Chelle Hubbard sent at 4/27/2023 10:06 AM CDT -----  Pt would like to speak to a nurse about side effects from medication. [phone]

## 2023-04-27 NOTE — TELEPHONE ENCOUNTER
Spoke to patient. She said she was started on Vesicare mid March when she came in for an appt. She developed dry mouth and then hives on her back. She stopped it for about a week and a half - 2 weeks. Restarted it to see and developed extreme dry mouth again making it hard to swallow. Wanting to see if there is something else since she cannot take this?    Printed for Dr toth.

## 2023-04-28 RX ORDER — MIRABEGRON 25 MG/1
25 TABLET, FILM COATED, EXTENDED RELEASE ORAL DAILY
Qty: 30 TABLET | Refills: 5 | Status: SHIPPED | OUTPATIENT
Start: 2023-04-28 | End: 2024-03-11

## 2023-04-28 NOTE — TELEPHONE ENCOUNTER
Spoke to patient. Advised per Dr Yoder, stop Vesicare and start Myrbetriq 25mg QD    Rx set up  [facility] on [facility] [address] [facility].

## 2023-04-28 NOTE — TELEPHONE ENCOUNTER
prescription sent to   [id number] - [place] [address] - [address]  [address]  [place] [id number]  Phone: [phone] Fax: [phone]     ambulatory

## 2023-05-23 ENCOUNTER — HOSPITAL ENCOUNTER (OUTPATIENT)
Dept: RADIOLOGY | Facility: HOSPITAL | Age: 73
Discharge: HOME | End: 2023-05-23
Attending: FAMILY MEDICINE
Payer: MEDICARE

## 2023-05-23 VITALS — WEIGHT: 149.94 LBS | HEIGHT: 60 IN | BODY MASS INDEX: 29.44 KG/M2

## 2023-05-23 DIAGNOSIS — Z12.31 SCREENING MAMMOGRAM, ENCOUNTER FOR: ICD-10-CM

## 2023-05-23 PROCEDURE — 77067 SCR MAMMO BI INCL CAD: CPT | Mod: TC,PO

## 2023-05-30 ENCOUNTER — TELEPHONE (OUTPATIENT)
Dept: FAMILY MEDICINE | Facility: CLINIC | Age: 73
End: 2023-05-30

## 2023-06-02 ENCOUNTER — OFFICE VISIT (OUTPATIENT)
Dept: FAMILY MEDICINE | Facility: CLINIC | Age: 73
End: 2023-06-02
Payer: MEDICARE

## 2023-06-02 VITALS
WEIGHT: 148 LBS | BODY MASS INDEX: 29.06 KG/M2 | DIASTOLIC BLOOD PRESSURE: 86 MMHG | HEART RATE: 72 BPM | HEIGHT: 60 IN | SYSTOLIC BLOOD PRESSURE: 130 MMHG

## 2023-06-02 DIAGNOSIS — L72.3 SEBACEOUS CYST: Primary | ICD-10-CM

## 2023-06-02 PROCEDURE — 3061F PR NEG MICROALBUMINURIA RESULT DOCUMENTED/REVIEW: ICD-10-PCS | Mod: CPTII,S$GLB,, | Performed by: FAMILY MEDICINE

## 2023-06-02 PROCEDURE — 3044F HG A1C LEVEL LT 7.0%: CPT | Mod: CPTII,S$GLB,, | Performed by: FAMILY MEDICINE

## 2023-06-02 PROCEDURE — 3066F PR DOCUMENTATION OF TREATMENT FOR NEPHROPATHY: ICD-10-PCS | Mod: CPTII,S$GLB,, | Performed by: FAMILY MEDICINE

## 2023-06-02 PROCEDURE — 1101F PR PT FALLS ASSESS DOC 0-1 FALLS W/OUT INJ PAST YR: ICD-10-PCS | Mod: CPTII,S$GLB,, | Performed by: FAMILY MEDICINE

## 2023-06-02 PROCEDURE — 1159F PR MEDICATION LIST DOCUMENTED IN MEDICAL RECORD: ICD-10-PCS | Mod: CPTII,S$GLB,, | Performed by: FAMILY MEDICINE

## 2023-06-02 PROCEDURE — 3075F PR MOST RECENT SYSTOLIC BLOOD PRESS GE 130-139MM HG: ICD-10-PCS | Mod: CPTII,S$GLB,, | Performed by: FAMILY MEDICINE

## 2023-06-02 PROCEDURE — 1159F MED LIST DOCD IN RCRD: CPT | Mod: CPTII,S$GLB,, | Performed by: FAMILY MEDICINE

## 2023-06-02 PROCEDURE — 3008F BODY MASS INDEX DOCD: CPT | Mod: CPTII,S$GLB,, | Performed by: FAMILY MEDICINE

## 2023-06-02 PROCEDURE — 3008F PR BODY MASS INDEX (BMI) DOCUMENTED: ICD-10-PCS | Mod: CPTII,S$GLB,, | Performed by: FAMILY MEDICINE

## 2023-06-02 PROCEDURE — 3079F DIAST BP 80-89 MM HG: CPT | Mod: CPTII,S$GLB,, | Performed by: FAMILY MEDICINE

## 2023-06-02 PROCEDURE — 99212 PR OFFICE/OUTPT VISIT, EST, LEVL II, 10-19 MIN: ICD-10-PCS | Mod: S$GLB,,, | Performed by: FAMILY MEDICINE

## 2023-06-02 PROCEDURE — 3288F PR FALLS RISK ASSESSMENT DOCUMENTED: ICD-10-PCS | Mod: CPTII,S$GLB,, | Performed by: FAMILY MEDICINE

## 2023-06-02 PROCEDURE — 3066F NEPHROPATHY DOC TX: CPT | Mod: CPTII,S$GLB,, | Performed by: FAMILY MEDICINE

## 2023-06-02 PROCEDURE — 3061F NEG MICROALBUMINURIA REV: CPT | Mod: CPTII,S$GLB,, | Performed by: FAMILY MEDICINE

## 2023-06-02 PROCEDURE — 3044F PR MOST RECENT HEMOGLOBIN A1C LEVEL <7.0%: ICD-10-PCS | Mod: CPTII,S$GLB,, | Performed by: FAMILY MEDICINE

## 2023-06-02 PROCEDURE — 1101F PT FALLS ASSESS-DOCD LE1/YR: CPT | Mod: CPTII,S$GLB,, | Performed by: FAMILY MEDICINE

## 2023-06-02 PROCEDURE — 3079F PR MOST RECENT DIASTOLIC BLOOD PRESSURE 80-89 MM HG: ICD-10-PCS | Mod: CPTII,S$GLB,, | Performed by: FAMILY MEDICINE

## 2023-06-02 PROCEDURE — 3075F SYST BP GE 130 - 139MM HG: CPT | Mod: CPTII,S$GLB,, | Performed by: FAMILY MEDICINE

## 2023-06-02 PROCEDURE — 99212 OFFICE O/P EST SF 10 MIN: CPT | Mod: S$GLB,,, | Performed by: FAMILY MEDICINE

## 2023-06-02 PROCEDURE — 3288F FALL RISK ASSESSMENT DOCD: CPT | Mod: CPTII,S$GLB,, | Performed by: FAMILY MEDICINE

## 2023-06-02 PROCEDURE — 1160F RVW MEDS BY RX/DR IN RCRD: CPT | Mod: CPTII,S$GLB,, | Performed by: FAMILY MEDICINE

## 2023-06-02 PROCEDURE — 1160F PR REVIEW ALL MEDS BY PRESCRIBER/CLIN PHARMACIST DOCUMENTED: ICD-10-PCS | Mod: CPTII,S$GLB,, | Performed by: FAMILY MEDICINE

## 2023-06-02 RX ORDER — MUPIROCIN 20 MG/G
OINTMENT TOPICAL 3 TIMES DAILY
COMMUNITY
End: 2023-09-11

## 2023-06-02 RX ORDER — CLINDAMYCIN HYDROCHLORIDE 300 MG/1
300 CAPSULE ORAL 3 TIMES DAILY
Qty: 21 CAPSULE | Refills: 0 | Status: SHIPPED | OUTPATIENT
Start: 2023-06-02 | End: 2023-09-11

## 2023-06-02 NOTE — PROGRESS NOTES
SUBJECTIVE:    Patient ID: Shahana Mcknight is a 72 y.o. female.    Chief Complaint: Mass (Cyst or lump on back for 2 weeks, no bottles, abc )    Mass  This is a new problem. The current episode started 1 to 4 weeks ago (2 weeks). Progression since onset: getting bigger, but has stayed the same for the last week. Pertinent negatives include no abdominal pain, arthralgias, chest pain, coughing, fatigue, fever, headaches, myalgias, nausea, neck pain, numbness, rash, vomiting or weakness. Associated symptoms comments: none. Exacerbated by: laying on it. Treatments tried: calamine, boil salve. The treatment provided mild relief.     No visits with results within 6 Month(s) from this visit.   Latest known visit with results is:   Office Visit on 09/14/2022   Component Date Value Ref Range Status    Cholesterol 03/06/2023 142  <200 mg/dL Final    HDL 03/06/2023 63  > OR = 50 mg/dL Final    Triglycerides 03/06/2023 67  <150 mg/dL Final    LDL Cholesterol 03/06/2023 65  mg/dL (calc) Final    HDL/Cholesterol Ratio 03/06/2023 2.3  <5.0 (calc) Final    Non HDL Chol. (LDL+VLDL) 03/06/2023 79  <130 mg/dL (calc) Final    Glucose 03/06/2023 89  65 - 99 mg/dL Final    BUN 03/06/2023 18  7 - 25 mg/dL Final    Creatinine 03/06/2023 0.78  0.60 - 1.00 mg/dL Final    eGFR 03/06/2023 80  > OR = 60 mL/min/1.73m2 Final    BUN/Creatinine Ratio 03/06/2023 NOT APPLICABLE  6 - 22 (calc) Final    Sodium 03/06/2023 142  135 - 146 mmol/L Final    Potassium 03/06/2023 4.1  3.5 - 5.3 mmol/L Final    Chloride 03/06/2023 103  98 - 110 mmol/L Final    CO2 03/06/2023 29  20 - 32 mmol/L Final    Calcium 03/06/2023 9.9  8.6 - 10.4 mg/dL Final    Total Protein 03/06/2023 7.0  6.1 - 8.1 g/dL Final    Albumin 03/06/2023 4.5  3.6 - 5.1 g/dL Final    Globulin, Total 03/06/2023 2.5  1.9 - 3.7 g/dL (calc) Final    Albumin/Globulin Ratio 03/06/2023 1.8  1.0 - 2.5 (calc) Final    Total Bilirubin 03/06/2023 0.5  0.2 - 1.2 mg/dL Final    Alkaline Phosphatase  03/06/2023 55  37 - 153 U/L Final    AST 03/06/2023 17  10 - 35 U/L Final    ALT 03/06/2023 19  6 - 29 U/L Final    Creatinine, Urine 03/06/2023 120  20 - 275 mg/dL Final    Microalb, Ur 03/06/2023 0.9  See Note: mg/dL Final    Microalb/Creat Ratio 03/06/2023 8  <30 mcg/mg creat Final    TSH w/reflex to FT4 03/06/2023 1.87  0.40 - 4.50 mIU/L Final    Color, UA 03/06/2023 YELLOW  YELLOW Final    Appearance, UA 03/06/2023 CLEAR  CLEAR Final    Specific Gravity, UA 03/06/2023 1.015  1.001 - 1.035 Final    pH, UA 03/06/2023 6.5  5.0 - 8.0 Final    Glucose, UA 03/06/2023 NEGATIVE  NEGATIVE Final    Bilirubin, UA 03/06/2023 NEGATIVE  NEGATIVE Final    Ketones, UA 03/06/2023 NEGATIVE  NEGATIVE Final    Occult Blood UA 03/06/2023 NEGATIVE  NEGATIVE Final    Protein, UA 03/06/2023 NEGATIVE  NEGATIVE Final    Nitrite, UA 03/06/2023 NEGATIVE  NEGATIVE Final    Leukocytes, UA 03/06/2023 NEGATIVE  NEGATIVE Final    WBC Casts, UA 03/06/2023 NONE SEEN  < OR = 5 /HPF Final    RBC Casts, UA 03/06/2023 0-2  < OR = 2 /HPF Final    Squam Epithel, UA 03/06/2023 NONE SEEN  < OR = 5 /HPF Final    Bacteria, UA 03/06/2023 NONE SEEN  NONE SEEN /HPF Final    Hyaline Casts, UA 03/06/2023 NONE SEEN  NONE SEEN /LPF Final    Service Cmt: 03/06/2023    Final    Reflexive Urine Culture 03/06/2023    Final    WBC 03/06/2023 5.2  3.8 - 10.8 Thousand/uL Final    RBC 03/06/2023 5.01  3.80 - 5.10 Million/uL Final    Hemoglobin 03/06/2023 14.7  11.7 - 15.5 g/dL Final    Hematocrit 03/06/2023 44.5  35.0 - 45.0 % Final    MCV 03/06/2023 88.8  80.0 - 100.0 fL Final    MCH 03/06/2023 29.3  27.0 - 33.0 pg Final    MCHC 03/06/2023 33.0  32.0 - 36.0 g/dL Final    RDW 03/06/2023 12.1  11.0 - 15.0 % Final    Platelets 03/06/2023 330  140 - 400 Thousand/uL Final    MPV 03/06/2023 11.7  7.5 - 12.5 fL Final    Neutrophils, Abs 03/06/2023 2,683  1,500 - 7,800 cells/uL Final    Lymph # 03/06/2023 1,768  850 - 3,900 cells/uL Final    Mono # 03/06/2023 536  200 - 950  cells/uL Final    Eos # 03/06/2023 161  15 - 500 cells/uL Final    Baso # 03/06/2023 52  0 - 200 cells/uL Final    Neutrophils Relative 03/06/2023 51.6  % Final    Lymph % 03/06/2023 34.0  % Final    Mono % 03/06/2023 10.3  % Final    Eosinophil % 03/06/2023 3.1  % Final    Basophil % 03/06/2023 1.0  % Final    Hemoglobin A1C 03/06/2023 5.3  <5.7 % of total Hgb Final       Past Medical History:   Diagnosis Date    Basal cell carcinoma 2005    R upper cut lip    Hyperlipidemia     Osteopenia      Social History     Socioeconomic History    Marital status:    Tobacco Use    Smoking status: Former    Smokeless tobacco: Never   Substance and Sexual Activity    Alcohol use: Yes     Comment: socially    Drug use: No    Sexual activity: Yes     Partners: Male     Social Determinants of Health     Financial Resource Strain: Low Risk     Difficulty of Paying Living Expenses: Not hard at all   Food Insecurity: No Food Insecurity    Worried About Running Out of Food in the Last Year: Never true    Ran Out of Food in the Last Year: Never true   Transportation Needs: No Transportation Needs    Lack of Transportation (Medical): No    Lack of Transportation (Non-Medical): No   Physical Activity: Sufficiently Active    Days of Exercise per Week: 5 days    Minutes of Exercise per Session: 30 min   Stress: No Stress Concern Present    Feeling of Stress : Not at all   Social Connections: Unknown    Frequency of Communication with Friends and Family: Three times a week    Frequency of Social Gatherings with Friends and Family: Twice a week    Active Member of Clubs or Organizations: Yes    Attends Club or Organization Meetings: 1 to 4 times per year    Marital Status:    Housing Stability: Low Risk     Unable to Pay for Housing in the Last Year: No    Number of Places Lived in the Last Year: 1    Unstable Housing in the Last Year: No     Past Surgical History:   Procedure Laterality Date    basil cell carcinoma removal       BREAST CYST ASPIRATION      HYSTERECTOMY      SINUS SURGERY       Family History   Problem Relation Age of Onset    Cancer Mother     Melanoma Neg Hx     Psoriasis Neg Hx     Lupus Neg Hx     Eczema Neg Hx        Review of patient's allergies indicates:   Allergen Reactions    Alendronic acid     Boniva [ibandronate]      Nausea and vomitting    Fosamax [alendronate] Nausea And Vomiting    Hydrocodone-acetaminophen     Hydrocodone-cpm-pseudoephed     Lovastatin     Oxycodone     Rosuvastatin        Current Outpatient Medications:     hydroCHLOROthiazide (HYDRODIURIL) 12.5 MG Tab, Take 1 tablet (12.5 mg total) by mouth once daily., Disp: 90 tablet, Rfl: 3    latanoprost 0.005 % ophthalmic solution, Place 1 drop into both eyes every evening., Disp: , Rfl:     latanoprost, PF, 0.005 % Drop, Apply to eye., Disp: , Rfl:     LIVALO 4 mg Tab, Take 1 tablet by mouth once daily. For 30 days, Disp: , Rfl:     mirabegron (MYRBETRIQ) 25 mg Tb24 ER tablet, Take 1 tablet (25 mg total) by mouth once daily., Disp: 30 tablet, Rfl: 5    mupirocin (BACTROBAN) 2 % ointment, Apply topically 3 (three) times daily., Disp: , Rfl:     risedronate (ACTONEL) 35 MG tablet, Take 1 tablet (35 mg total) by mouth every 7 days., Disp: 12 tablet, Rfl: 3    azelastine (ASTELIN) 137 mcg (0.1 %) nasal spray, 1 spray (137 mcg total) by Nasal route 2 (two) times daily., Disp: 30 mL, Rfl: 5    clindamycin (CLEOCIN) 300 MG capsule, Take 1 capsule (300 mg total) by mouth 3 (three) times daily., Disp: 21 capsule, Rfl: 0    Review of Systems   Constitutional:  Negative for appetite change, fatigue, fever and unexpected weight change.   Respiratory:  Negative for cough, chest tightness, shortness of breath and wheezing.    Cardiovascular:  Negative for chest pain and leg swelling.   Gastrointestinal:  Negative for abdominal pain, constipation, nausea and vomiting.        -heartburn   Genitourinary:  Negative for difficulty urinating, dysuria, frequency and  urgency.   Musculoskeletal:  Negative for arthralgias, back pain, myalgias and neck pain.   Skin:  Negative for rash.        +bump on back   Neurological:  Negative for dizziness, weakness, numbness and headaches.   Hematological:  Does not bruise/bleed easily.   Psychiatric/Behavioral:  Negative for dysphoric mood, sleep disturbance and suicidal ideas. The patient is not nervous/anxious.    All other systems reviewed and are negative.       Objective:      Vitals:    06/02/23 1105   BP: 130/86   Pulse: 72   Weight: 67.1 kg (148 lb)   Height: 5' (1.524 m)     Physical Exam  Vitals reviewed.   Constitutional:       Appearance: She is well-developed.   HENT:      Head: Normocephalic and atraumatic.   Neck:      Thyroid: No thyromegaly.   Cardiovascular:      Rate and Rhythm: Normal rate and regular rhythm.      Heart sounds: No murmur heard.    No friction rub.   Pulmonary:      Effort: Pulmonary effort is normal.      Breath sounds: Normal breath sounds. No wheezing or rales.   Abdominal:      General: Bowel sounds are normal. There is no distension.      Palpations: Abdomen is soft.      Tenderness: There is no abdominal tenderness.   Musculoskeletal:      Cervical back: Neck supple.   Lymphadenopathy:      Cervical: No cervical adenopathy.   Skin:     General: Skin is warm and dry.      Findings: No rash.          Neurological:      Mental Status: She is alert and oriented to person, place, and time.   Psychiatric:         Speech: Speech normal.         Thought Content: Thought content normal.         Judgment: Judgment normal.         Assessment:       1. Sebaceous cyst         Plan:       Sebaceous cyst  Comments:  Slightly infected. Will tx with antibiotic. Pt to follow with Derm to have removed.  Orders:  -     clindamycin (CLEOCIN) 300 MG capsule; Take 1 capsule (300 mg total) by mouth 3 (three) times daily.  Dispense: 21 capsule; Refill: 0      Follow up for As scheduled.        6/2/2023 Jeanmarie Yoder

## 2023-06-07 ENCOUNTER — OFFICE VISIT (OUTPATIENT)
Dept: DERMATOLOGY | Facility: CLINIC | Age: 73
End: 2023-06-07
Payer: MEDICARE

## 2023-06-07 VITALS — WEIGHT: 148 LBS | BODY MASS INDEX: 29.06 KG/M2 | HEIGHT: 60 IN

## 2023-06-07 DIAGNOSIS — L72.3 INFLAMED SEBACEOUS CYST: Primary | ICD-10-CM

## 2023-06-07 PROCEDURE — 88304 PR  SURG PATH,LEVEL III: ICD-10-PCS | Mod: 26,,, | Performed by: PATHOLOGY

## 2023-06-07 PROCEDURE — 88304 TISSUE EXAM BY PATHOLOGIST: CPT | Mod: 26,,, | Performed by: PATHOLOGY

## 2023-06-07 PROCEDURE — 99499 UNLISTED E&M SERVICE: CPT | Mod: S$GLB,,, | Performed by: DERMATOLOGY

## 2023-06-07 PROCEDURE — 11403 PR EXC SKIN BENIG 2.1-3 CM TRUNK,ARM,LEG: ICD-10-PCS | Mod: S$GLB,,, | Performed by: DERMATOLOGY

## 2023-06-07 PROCEDURE — 99499 NO LOS: ICD-10-PCS | Mod: S$GLB,,, | Performed by: DERMATOLOGY

## 2023-06-07 PROCEDURE — 11403 EXC TR-EXT B9+MARG 2.1-3CM: CPT | Mod: S$GLB,,, | Performed by: DERMATOLOGY

## 2023-06-07 PROCEDURE — 87070 CULTURE OTHR SPECIMN AEROBIC: CPT | Performed by: DERMATOLOGY

## 2023-06-07 PROCEDURE — 88304 TISSUE EXAM BY PATHOLOGIST: CPT | Performed by: PATHOLOGY

## 2023-06-07 NOTE — PROGRESS NOTES
Subjective:      Patient ID:  Shahana Mcknight is a 72 y.o. female who presents for   Chief Complaint   Patient presents with    Spot     Back      LOV 10/12/22- [place], [place], [place]    Patient here today for spot to back x 2 weeks, cyst?  Pt states PCP placed her on clindamycin, still completing course.   Pt states spot presented with soreness, recently subsided.     Derm hx:   Hx of BCC x's 2 on face within six months of each other treated with MOHS (Dr. Martinez) and plastics to closed next day Nov 2005- Venkatesh Winston   Hx of BCC right leg treated with surgery by derm in [place] about 6 years ago.    Current Outpatient Medications:   ·  clindamycin (CLEOCIN) 300 MG capsule, Take 1 capsule (300 mg total) by mouth 3 (three) times daily., Disp: 21 capsule, Rfl: 0  ·  hydroCHLOROthiazide (HYDRODIURIL) 12.5 MG Tab, Take 1 tablet (12.5 mg total) by mouth once daily., Disp: 90 tablet, Rfl: 3  ·  latanoprost 0.005 % ophthalmic solution, Place 1 drop into both eyes every evening., Disp: , Rfl:   ·  latanoprost, PF, 0.005 % Drop, Apply to eye., Disp: , Rfl:   ·  LIVALO 4 mg Tab, Take 1 tablet by mouth once daily. For 30 days, Disp: , Rfl:   ·  mirabegron (MYRBETRIQ) 25 mg Tb24 ER tablet, Take 1 tablet (25 mg total) by mouth once daily., Disp: 30 tablet, Rfl: 5  ·  mupirocin (BACTROBAN) 2 % ointment, Apply topically 3 (three) times daily., Disp: , Rfl:   ·  risedronate (ACTONEL) 35 MG tablet, Take 1 tablet (35 mg total) by mouth every 7 days., Disp: 12 tablet, Rfl: 3  ·  azelastine (ASTELIN) 137 mcg (0.1 %) nasal spray, 1 spray (137 mcg total) by Nasal route 2 (two) times daily., Disp: 30 mL, Rfl: 5             Review of Systems   Constitutional:  Negative for fever, chills and fatigue.   Respiratory:  Negative for cough and shortness of breath.    Skin:  Positive for dry skin, daily sunscreen use, activity-related sunscreen use and wears hat. Negative for itching and rash.     Objective:   Physical Exam   Constitutional: She  appears well-developed and well-nourished. No distress.   Neurological: She is alert and oriented to person, place, and time. She is not disoriented.   Psychiatric: She has a normal mood and affect.   Skin:   Areas Examined (abnormalities noted in diagram):   Back Inspection Performed          Diagram Legend     Erythematous scaling macule/papule c/w actinic keratosis       Vascular papule c/w angioma      Pigmented verrucoid papule/plaque c/w seborrheic keratosis      Yellow umbilicated papule c/w sebaceous hyperplasia      Irregularly shaped tan macule c/w lentigo     1-2 mm smooth white papules consistent with Milia      Movable subcutaneous cyst with punctum c/w epidermal inclusion cyst      Subcutaneous movable cyst c/w pilar cyst      Firm pink to brown papule c/w dermatofibroma      Pedunculated fleshy papule(s) c/w skin tag(s)      Evenly pigmented macule c/w junctional nevus     Mildly variegated pigmented, slightly irregular-bordered macule c/w mildly atypical nevus      Flesh colored to evenly pigmented papule c/w intradermal nevus       Pink pearly papule/plaque c/w basal cell carcinoma      Erythematous hyperkeratotic cursted plaque c/w SCC      Surgical scar with no sign of skin cancer recurrence      Open and closed comedones      Inflammatory papules and pustules      Verrucoid papule consistent consistent with wart     Erythematous eczematous patches and plaques     Dystrophic onycholytic nail with subungual debris c/w onychomycosis     Umbilicated papule    Erythematous-base heme-crusted tan verrucoid plaque consistent with inflamed seborrheic keratosis     Erythematous Silvery Scaling Plaque c/w Psoriasis     See annotation      Assessment / Plan:      Pathology Orders:       Normal Orders This Visit    Specimen to Pathology, Dermatology     Questions:    Procedure Type: Dermatology and skin neoplasms    Number of Specimens: 1    ------------------------: -------------------------    Spec 1  Procedure: Excision >2cm    Spec 1 Clinical Impression: eic vs other    Spec 1 Source: midline back    Release to patient:           Inflamed sebaceous cyst  -     Specimen to Pathology, Dermatology    PREPARATION: The diagnosis, procedure, alternatives, benefits and risks, including but not limited to: infection, bleeding/bruising, drug reactions, pain, scar or cosmetic defect, local sensation disturbances, wound dehiscence (separation of wound edges after sutures removed) and/or recurrence of present condition were explained to the patient. The patient elected to proceed.  Patient's identity was verified using 2 patient identifiers and the side and site was verified.  Time out period with surgeon, assistant and patient in surgical suite was taken.    PROCEDURE: The location noted above was prepped and draped in the usual sterile fashion. The area was anesthetized with intradermal buffered xylocaine. An elliptiform excision with a #15 scalpel was performed to access the  cyst wall, and dissection was carried out around the cyst wall.  Electrocoagulation was used to obtain hemostasis. Blood loss was minimal. The wound was then superficially closed with simple interrupted sutures of 3.0 Prolene.   Lesion size: 2.5 cm  Final incision length: 2.5 cm          Follow up in about 2 weeks (around 6/21/2023) for suture removal.

## 2023-06-10 LAB — BACTERIA SPEC AEROBE CULT: NORMAL

## 2023-06-16 LAB
FINAL PATHOLOGIC DIAGNOSIS: NORMAL
Lab: NORMAL

## 2023-06-20 ENCOUNTER — CLINICAL SUPPORT (OUTPATIENT)
Dept: DERMATOLOGY | Facility: CLINIC | Age: 73
End: 2023-06-20
Payer: MEDICARE

## 2023-06-20 DIAGNOSIS — L72.3 INFLAMED SEBACEOUS CYST: Primary | ICD-10-CM

## 2023-06-20 PROCEDURE — 99024 POSTOP FOLLOW-UP VISIT: CPT | Mod: S$GLB,,, | Performed by: DERMATOLOGY

## 2023-06-20 PROCEDURE — 99024 PR POST-OP FOLLOW-UP VISIT: ICD-10-PCS | Mod: S$GLB,,, | Performed by: DERMATOLOGY

## 2023-09-11 ENCOUNTER — OFFICE VISIT (OUTPATIENT)
Dept: FAMILY MEDICINE | Facility: CLINIC | Age: 73
End: 2023-09-11
Payer: MEDICARE

## 2023-09-11 VITALS
SYSTOLIC BLOOD PRESSURE: 130 MMHG | WEIGHT: 141 LBS | HEART RATE: 76 BPM | BODY MASS INDEX: 27.68 KG/M2 | HEIGHT: 60 IN | DIASTOLIC BLOOD PRESSURE: 80 MMHG

## 2023-09-11 DIAGNOSIS — Z29.11 NEED FOR RSV VACCINATION: ICD-10-CM

## 2023-09-11 DIAGNOSIS — I10 ESSENTIAL HYPERTENSION: Primary | ICD-10-CM

## 2023-09-11 DIAGNOSIS — K21.9 GASTROESOPHAGEAL REFLUX DISEASE WITHOUT ESOPHAGITIS: ICD-10-CM

## 2023-09-11 DIAGNOSIS — Z79.899 LONG-TERM USE OF HIGH-RISK MEDICATION: ICD-10-CM

## 2023-09-11 DIAGNOSIS — J31.0 CHRONIC RHINITIS: ICD-10-CM

## 2023-09-11 PROCEDURE — 1160F RVW MEDS BY RX/DR IN RCRD: CPT | Mod: CPTII,S$GLB,, | Performed by: FAMILY MEDICINE

## 2023-09-11 PROCEDURE — 1159F PR MEDICATION LIST DOCUMENTED IN MEDICAL RECORD: ICD-10-PCS | Mod: CPTII,S$GLB,, | Performed by: FAMILY MEDICINE

## 2023-09-11 PROCEDURE — 3044F PR MOST RECENT HEMOGLOBIN A1C LEVEL <7.0%: ICD-10-PCS | Mod: CPTII,S$GLB,, | Performed by: FAMILY MEDICINE

## 2023-09-11 PROCEDURE — 3044F HG A1C LEVEL LT 7.0%: CPT | Mod: CPTII,S$GLB,, | Performed by: FAMILY MEDICINE

## 2023-09-11 PROCEDURE — 3288F FALL RISK ASSESSMENT DOCD: CPT | Mod: CPTII,S$GLB,, | Performed by: FAMILY MEDICINE

## 2023-09-11 PROCEDURE — 99214 PR OFFICE/OUTPT VISIT, EST, LEVL IV, 30-39 MIN: ICD-10-PCS | Mod: S$GLB,,, | Performed by: FAMILY MEDICINE

## 2023-09-11 PROCEDURE — 3288F PR FALLS RISK ASSESSMENT DOCUMENTED: ICD-10-PCS | Mod: CPTII,S$GLB,, | Performed by: FAMILY MEDICINE

## 2023-09-11 PROCEDURE — 1101F PT FALLS ASSESS-DOCD LE1/YR: CPT | Mod: CPTII,S$GLB,, | Performed by: FAMILY MEDICINE

## 2023-09-11 PROCEDURE — 3079F PR MOST RECENT DIASTOLIC BLOOD PRESSURE 80-89 MM HG: ICD-10-PCS | Mod: CPTII,S$GLB,, | Performed by: FAMILY MEDICINE

## 2023-09-11 PROCEDURE — 3061F NEG MICROALBUMINURIA REV: CPT | Mod: CPTII,S$GLB,, | Performed by: FAMILY MEDICINE

## 2023-09-11 PROCEDURE — 3066F NEPHROPATHY DOC TX: CPT | Mod: CPTII,S$GLB,, | Performed by: FAMILY MEDICINE

## 2023-09-11 PROCEDURE — 3079F DIAST BP 80-89 MM HG: CPT | Mod: CPTII,S$GLB,, | Performed by: FAMILY MEDICINE

## 2023-09-11 PROCEDURE — 3061F PR NEG MICROALBUMINURIA RESULT DOCUMENTED/REVIEW: ICD-10-PCS | Mod: CPTII,S$GLB,, | Performed by: FAMILY MEDICINE

## 2023-09-11 PROCEDURE — 1159F MED LIST DOCD IN RCRD: CPT | Mod: CPTII,S$GLB,, | Performed by: FAMILY MEDICINE

## 2023-09-11 PROCEDURE — 3066F PR DOCUMENTATION OF TREATMENT FOR NEPHROPATHY: ICD-10-PCS | Mod: CPTII,S$GLB,, | Performed by: FAMILY MEDICINE

## 2023-09-11 PROCEDURE — 1160F PR REVIEW ALL MEDS BY PRESCRIBER/CLIN PHARMACIST DOCUMENTED: ICD-10-PCS | Mod: CPTII,S$GLB,, | Performed by: FAMILY MEDICINE

## 2023-09-11 PROCEDURE — 99214 OFFICE O/P EST MOD 30 MIN: CPT | Mod: S$GLB,,, | Performed by: FAMILY MEDICINE

## 2023-09-11 PROCEDURE — 3075F PR MOST RECENT SYSTOLIC BLOOD PRESS GE 130-139MM HG: ICD-10-PCS | Mod: CPTII,S$GLB,, | Performed by: FAMILY MEDICINE

## 2023-09-11 PROCEDURE — 1101F PR PT FALLS ASSESS DOC 0-1 FALLS W/OUT INJ PAST YR: ICD-10-PCS | Mod: CPTII,S$GLB,, | Performed by: FAMILY MEDICINE

## 2023-09-11 PROCEDURE — 3075F SYST BP GE 130 - 139MM HG: CPT | Mod: CPTII,S$GLB,, | Performed by: FAMILY MEDICINE

## 2023-09-11 RX ORDER — RESPIRATORY SYNCYTIAL VISUS VACCINE RECOMBINANT, ADJUVANTED 120MCG/0.5
0.5 KIT INTRAMUSCULAR ONCE
Qty: 0.5 ML | Refills: 0 | Status: SHIPPED | OUTPATIENT
Start: 2023-09-11 | End: 2023-09-11

## 2023-09-11 RX ORDER — AZELASTINE 1 MG/ML
1 SPRAY, METERED NASAL 2 TIMES DAILY
Qty: 30 ML | Refills: 5 | Status: SHIPPED | OUTPATIENT
Start: 2023-09-11 | End: 2024-03-11

## 2023-09-11 RX ORDER — CYCLOBENZAPRINE HCL 10 MG
10 TABLET ORAL NIGHTLY
COMMUNITY
End: 2024-03-11

## 2023-09-11 RX ORDER — OXYCODONE HYDROCHLORIDE 5 MG/1
5 CAPSULE ORAL
COMMUNITY
End: 2024-03-11

## 2023-09-11 NOTE — PROGRESS NOTES
SUBJECTIVE:    Patient ID: Shahana Mcknight is a 73 y.o. female.    Chief Complaint: Follow-up (6 mo f/u//brought med bottles//no complaints//tc)      Pt here to checkup on acute and chronic conditions (HTN, Osteoporosis, Menopause, GERD, and hyperlipidemia, allergic rhinitis)    BP is doing ok. Denies CP/SOB/ROD.   ([facility]) has an appt in Nov.     Continues to tolerate risendronate once a week for osteoporosis. (intolerance to Boniva, jaw problems and N/V)   Also taking calcium and vitamin D as well.      Denies heartburn.  Has not had to use rolaids or tums. Watches diet, and avoid triggers    Allergies are doing ok. Has been using astelin and zyrtec.     Since last visit, has had right knee replaced by Dr. Nikkie ALBRIGHT.   Has a f/u appt at the end of the month to re-evaluation. It is still a bit swollen.  Has been exercising outside of therapy. Her left knee is doing ok, doesn't cause her much pain.      No recent labs.     Stopped taking myrbetriq after her surgery during her surgery.  Not having to wear bladder pads.   -----------------------------------------------------  Continues to see Dr. Reza for her glaucoma.  Mammogram was 5/2023, to repeat in 1 yr. DEXA 3/2022, improved.  Cscope  5/2020 with Dr. Almeida, polyps, to repeat in 5 yrs    Plans to get her flu and covid vaccine.            Past Medical History:   Diagnosis Date    Basal cell carcinoma 2005    R upper cut lip    Hyperlipidemia     Osteopenia      Social History     Socioeconomic History    Marital status:    Tobacco Use    Smoking status: Former    Smokeless tobacco: Never   Substance and Sexual Activity    Alcohol use: Yes     Comment: socially    Drug use: No    Sexual activity: Yes     Partners: Male     Social Determinants of Health     Financial Resource Strain: Low Risk  (9/9/2023)    Overall Financial Resource Strain (CARDIA)     Difficulty of Paying Living Expenses: Not hard at all   Food Insecurity: No Food Insecurity  (9/9/2023)    Hunger Vital Sign     Worried About Running Out of Food in the Last Year: Never true     Ran Out of Food in the Last Year: Never true   Transportation Needs: No Transportation Needs (9/9/2023)    PRAPARE - Transportation     Lack of Transportation (Medical): No     Lack of Transportation (Non-Medical): No   Physical Activity: Sufficiently Active (9/9/2023)    Exercise Vital Sign     Days of Exercise per Week: 5 days     Minutes of Exercise per Session: 40 min   Stress: No Stress Concern Present (9/9/2023)    [facility] of Occupational Health - Occupational Stress Questionnaire     Feeling of Stress : Only a little   Social Connections: Unknown (9/9/2023)    Social Connection and Isolation Panel [NHANES]     Frequency of Communication with Friends and Family: More than three times a week     Frequency of Social Gatherings with Friends and Family: Once a week     Active Member of Clubs or Organizations: Yes     Attends Club or Organization Meetings: 1 to 4 times per year     Marital Status:    Housing Stability: Low Risk  (9/9/2023)    Housing Stability Vital Sign     Unable to Pay for Housing in the Last Year: No     Number of Places Lived in the Last Year: 1     Unstable Housing in the Last Year: No     Past Surgical History:   Procedure Laterality Date    basil cell carcinoma removal      BREAST CYST ASPIRATION      HYSTERECTOMY      SINUS SURGERY      TOTAL KNEE ARTHROPLASTY Right 06/27/2023     Family History   Problem Relation Age of Onset    Cancer Mother     Melanoma Neg Hx     Psoriasis Neg Hx     Lupus Neg Hx     Eczema Neg Hx        Review of patient's allergies indicates:   Allergen Reactions    Alendronic acid     Boniva [ibandronate]      Nausea and vomitting    Fosamax [alendronate] Nausea And Vomiting    Hydrocodone-acetaminophen     Hydrocodone-cpm-pseudoephed     Lovastatin     Oxycodone     Rosuvastatin        Current Outpatient Medications:     cyclobenzaprine (FLEXERIL)  10 MG tablet, Take 10 mg by mouth every evening. 1/2 tablet at night, Disp: , Rfl:     hydroCHLOROthiazide (HYDRODIURIL) 12.5 MG Tab, Take 1 tablet (12.5 mg total) by mouth once daily., Disp: 90 tablet, Rfl: 3    latanoprost, PF, 0.005 % Drop, Apply to eye., Disp: , Rfl:     LIVALO 4 mg Tab, Take 1 tablet by mouth once daily. For 30 days, Disp: , Rfl:     mirabegron (MYRBETRIQ) 25 mg Tb24 ER tablet, Take 1 tablet (25 mg total) by mouth once daily., Disp: 30 tablet, Rfl: 5    oxyCODONE (OXY-IR) 5 mg Cap, Take 5 mg by mouth every 24 hours as needed for Pain. 1/2 tablet daily prn pain, Disp: , Rfl:     risedronate (ACTONEL) 35 MG tablet, Take 1 tablet (35 mg total) by mouth every 7 days., Disp: 12 tablet, Rfl: 3    azelastine (ASTELIN) 137 mcg (0.1 %) nasal spray, 1 spray (137 mcg total) by Nasal route 2 (two) times daily., Disp: 30 mL, Rfl: 5    RSVPreF3 antigen-AS01E, PF, (AREXVY, PF,) 120 mcg/0.5 mL SusR vaccine, Inject 0.5 mLs into the muscle once. for 1 dose, Disp: 0.5 mL, Rfl: 0    Review of Systems   Constitutional:  Negative for appetite change, fatigue, fever and unexpected weight change.   HENT:  Negative for ear pain, postnasal drip, sinus pain and sore throat.    Respiratory:  Negative for cough, shortness of breath and wheezing.    Cardiovascular:  Negative for chest pain and leg swelling.   Gastrointestinal:  Negative for abdominal pain, constipation, nausea and vomiting.        -heartburn   Genitourinary:  Negative for difficulty urinating, dysuria and frequency.   Musculoskeletal:  Negative for back pain, myalgias and neck pain.   Skin:  Negative for rash.   Neurological:  Negative for weakness, numbness and headaches.   Hematological:  Does not bruise/bleed easily.   Psychiatric/Behavioral:  Negative for dysphoric mood, sleep disturbance and suicidal ideas. The patient is not nervous/anxious.           Objective:      Vitals:    09/11/23 0753   BP: 130/80   Pulse: 76   Weight: 64 kg (141 lb)   Height:  5' (1.524 m)     Wt Readings from Last 3 Encounters:   09/11/23 64 kg (141 lb)   06/07/23 67.1 kg (148 lb)   06/02/23 67.1 kg (148 lb)         Physical Exam  Vitals reviewed.   Constitutional:       General: She is not in acute distress.     Appearance: Normal appearance. She is well-developed and overweight.   HENT:      Head: Normocephalic and atraumatic.   Neck:      Thyroid: No thyromegaly.   Cardiovascular:      Rate and Rhythm: Normal rate and regular rhythm.      Heart sounds: Normal heart sounds. No murmur heard.     No friction rub.   Pulmonary:      Effort: Pulmonary effort is normal.      Breath sounds: Normal breath sounds. No wheezing or rales.   Abdominal:      General: Bowel sounds are normal. There is no distension.      Palpations: Abdomen is soft.      Tenderness: There is no abdominal tenderness.   Musculoskeletal:      Cervical back: Neck supple.   Lymphadenopathy:      Cervical: No cervical adenopathy.   Skin:     General: Skin is warm and dry.      Findings: No rash.   Neurological:      Mental Status: She is alert and oriented to person, place, and time.   Psychiatric:         Attention and Perception: She is attentive.         Speech: Speech normal.         Behavior: Behavior normal.         Thought Content: Thought content normal.         Judgment: Judgment normal.           Assessment:       1. Essential hypertension    2. Chronic rhinitis    3. Gastroesophageal reflux disease without esophagitis    4. Need for RSV vaccination    5. Long-term use of high-risk medication           Plan:       Essential hypertension  Comments:  Controlled. Will continue to monitor BP on current medication regimen.   Orders:  -     TSH w/reflex to FT4; Future; Expected date: 09/11/2023  -     Urinalysis, Reflex to Urine Culture Urine, Clean Catch; Future; Expected date: 09/11/2023  -     CBC Auto Differential; Future; Expected date: 09/11/2023  -     Lipid Panel; Future; Expected date: 09/11/2023  -      Comprehensive Metabolic Panel; Future; Expected date: 09/11/2023  -     Microalbumin/Creatinine Ratio, Urine; Future; Expected date: 09/11/2023    Chronic rhinitis  Comments:  Controlled. Will continue to monitor symptoms  Orders:  -     azelastine (ASTELIN) 137 mcg (0.1 %) nasal spray; 1 spray (137 mcg total) by Nasal route 2 (two) times daily.  Dispense: 30 mL; Refill: 5    Gastroesophageal reflux disease without esophagitis  Comments:  Controlled. Will continue to monitor symptoms    Need for RSV vaccination  -     RSVPreF3 antigen-AS01E, PF, (AREXVY, PF,) 120 mcg/0.5 mL SusR vaccine; Inject 0.5 mLs into the muscle once. for 1 dose  Dispense: 0.5 mL; Refill: 0    Long-term use of high-risk medication  -     TSH w/reflex to FT4; Future; Expected date: 09/11/2023  -     Urinalysis, Reflex to Urine Culture Urine, Clean Catch; Future; Expected date: 09/11/2023  -     CBC Auto Differential; Future; Expected date: 09/11/2023  -     Lipid Panel; Future; Expected date: 09/11/2023  -     Comprehensive Metabolic Panel; Future; Expected date: 09/11/2023  -     Microalbumin/Creatinine Ratio, Urine; Future; Expected date: 09/11/2023      Labs and/or tests have been ordered for the evaluation/monitoring of acute/chronic conditions, to be done just before next visit.    Discussed need for RSV, flu and covid vaccine for this year.     Follow up in about 3 months (around 12/11/2023) for HTN, GERD, Allergies.        9/11/2023 Jeanmarie Yoder

## 2023-10-06 ENCOUNTER — OFFICE VISIT (OUTPATIENT)
Dept: DERMATOLOGY | Facility: CLINIC | Age: 73
End: 2023-10-06
Payer: MEDICARE

## 2023-10-06 VITALS — BODY MASS INDEX: 25.95 KG/M2 | WEIGHT: 141 LBS | HEIGHT: 62 IN

## 2023-10-06 DIAGNOSIS — D22.9 MULTIPLE BENIGN NEVI: ICD-10-CM

## 2023-10-06 DIAGNOSIS — L81.4 SOLAR LENTIGO: ICD-10-CM

## 2023-10-06 DIAGNOSIS — Z85.828 ENCOUNTER FOR FOLLOW-UP SURVEILLANCE OF SKIN CANCER: ICD-10-CM

## 2023-10-06 DIAGNOSIS — L82.1 SEBORRHEIC KERATOSES: ICD-10-CM

## 2023-10-06 DIAGNOSIS — L82.0 INFLAMED SEBORRHEIC KERATOSIS: Primary | ICD-10-CM

## 2023-10-06 DIAGNOSIS — D18.01 CHERRY ANGIOMA: ICD-10-CM

## 2023-10-06 DIAGNOSIS — Z08 ENCOUNTER FOR FOLLOW-UP SURVEILLANCE OF SKIN CANCER: ICD-10-CM

## 2023-10-06 PROCEDURE — 99213 OFFICE O/P EST LOW 20 MIN: CPT | Mod: 25,S$GLB,, | Performed by: DERMATOLOGY

## 2023-10-06 PROCEDURE — 1101F PT FALLS ASSESS-DOCD LE1/YR: CPT | Mod: CPTII,S$GLB,, | Performed by: DERMATOLOGY

## 2023-10-06 PROCEDURE — 1159F PR MEDICATION LIST DOCUMENTED IN MEDICAL RECORD: ICD-10-PCS | Mod: CPTII,S$GLB,, | Performed by: DERMATOLOGY

## 2023-10-06 PROCEDURE — 1160F PR REVIEW ALL MEDS BY PRESCRIBER/CLIN PHARMACIST DOCUMENTED: ICD-10-PCS | Mod: CPTII,S$GLB,, | Performed by: DERMATOLOGY

## 2023-10-06 PROCEDURE — 3066F NEPHROPATHY DOC TX: CPT | Mod: CPTII,S$GLB,, | Performed by: DERMATOLOGY

## 2023-10-06 PROCEDURE — 3066F PR DOCUMENTATION OF TREATMENT FOR NEPHROPATHY: ICD-10-PCS | Mod: CPTII,S$GLB,, | Performed by: DERMATOLOGY

## 2023-10-06 PROCEDURE — 1159F MED LIST DOCD IN RCRD: CPT | Mod: CPTII,S$GLB,, | Performed by: DERMATOLOGY

## 2023-10-06 PROCEDURE — 17110 PR DESTRUCTION BENIGN LESIONS UP TO 14: ICD-10-PCS | Mod: S$GLB,,, | Performed by: DERMATOLOGY

## 2023-10-06 PROCEDURE — 1101F PR PT FALLS ASSESS DOC 0-1 FALLS W/OUT INJ PAST YR: ICD-10-PCS | Mod: CPTII,S$GLB,, | Performed by: DERMATOLOGY

## 2023-10-06 PROCEDURE — 3044F PR MOST RECENT HEMOGLOBIN A1C LEVEL <7.0%: ICD-10-PCS | Mod: CPTII,S$GLB,, | Performed by: DERMATOLOGY

## 2023-10-06 PROCEDURE — 17110 DESTRUCTION B9 LES UP TO 14: CPT | Mod: S$GLB,,, | Performed by: DERMATOLOGY

## 2023-10-06 PROCEDURE — 3044F HG A1C LEVEL LT 7.0%: CPT | Mod: CPTII,S$GLB,, | Performed by: DERMATOLOGY

## 2023-10-06 PROCEDURE — 3288F PR FALLS RISK ASSESSMENT DOCUMENTED: ICD-10-PCS | Mod: CPTII,S$GLB,, | Performed by: DERMATOLOGY

## 2023-10-06 PROCEDURE — 99213 PR OFFICE/OUTPT VISIT, EST, LEVL III, 20-29 MIN: ICD-10-PCS | Mod: 25,S$GLB,, | Performed by: DERMATOLOGY

## 2023-10-06 PROCEDURE — 3061F PR NEG MICROALBUMINURIA RESULT DOCUMENTED/REVIEW: ICD-10-PCS | Mod: CPTII,S$GLB,, | Performed by: DERMATOLOGY

## 2023-10-06 PROCEDURE — 1160F RVW MEDS BY RX/DR IN RCRD: CPT | Mod: CPTII,S$GLB,, | Performed by: DERMATOLOGY

## 2023-10-06 PROCEDURE — 3288F FALL RISK ASSESSMENT DOCD: CPT | Mod: CPTII,S$GLB,, | Performed by: DERMATOLOGY

## 2023-10-06 PROCEDURE — 3061F NEG MICROALBUMINURIA REV: CPT | Mod: CPTII,S$GLB,, | Performed by: DERMATOLOGY

## 2023-10-06 NOTE — PROGRESS NOTES
"  Subjective:      Patient ID:  Shahana Mcknight is a 73 y.o. female who presents for   Chief Complaint   Patient presents with    Skin Check     TBSC      LOV 6/7/23- Inflamed Sebaceous Cyst    Patient here today for TBSC  C/O spot to right shoulder x "a while" itchy    Derm hx:   Hx of BCC x's 2 on face within six months of each other treated with MOHS (Dr. Martinez) and plastics to closed next day Nov 2005- Venkatesh Montero   Hx of BCC right leg treated with surgery by derm in [place] about 2010s.    Current Outpatient Medications:   ·  azelastine (ASTELIN) 137 mcg (0.1 %) nasal spray, 1 spray (137 mcg total) by Nasal route 2 (two) times daily., Disp: 30 mL, Rfl: 5  ·  cyclobenzaprine (FLEXERIL) 10 MG tablet, Take 10 mg by mouth every evening. 1/2 tablet at night, Disp: , Rfl:   ·  hydroCHLOROthiazide (HYDRODIURIL) 12.5 MG Tab, Take 1 tablet (12.5 mg total) by mouth once daily., Disp: 90 tablet, Rfl: 3  ·  latanoprost, PF, 0.005 % Drop, Apply to eye., Disp: , Rfl:   ·  LIVALO 4 mg Tab, Take 1 tablet by mouth once daily. For 30 days, Disp: , Rfl:   ·  mirabegron (MYRBETRIQ) 25 mg Tb24 ER tablet, Take 1 tablet (25 mg total) by mouth once daily., Disp: 30 tablet, Rfl: 5  ·  oxyCODONE (OXY-IR) 5 mg Cap, Take 5 mg by mouth every 24 hours as needed for Pain. 1/2 tablet daily prn pain, Disp: , Rfl:   ·  risedronate (ACTONEL) 35 MG tablet, Take 1 tablet (35 mg total) by mouth every 7 days., Disp: 12 tablet, Rfl: 3          Review of Systems   Constitutional:  Negative for fever, chills and fatigue.   Respiratory:  Negative for cough and shortness of breath.    Skin:  Positive for dry skin, daily sunscreen use, activity-related sunscreen use and wears hat. Negative for itching and rash.       Objective:   Physical Exam   Constitutional: She appears well-developed and well-nourished. No distress.   Eyes: Lids are normal.  No conjunctival no injection.   Cardiovascular:  There is no local extremity swelling and no dependent " edema.             Neurological: She is alert and oriented to person, place, and time. She is not disoriented.   Psychiatric: She has a normal mood and affect.   Skin:   Areas Examined (abnormalities noted in diagram):   Scalp / Hair Palpated and Inspected  Head / Face Inspection Performed  Neck Inspection Performed  Chest / Axilla Inspection Performed  Abdomen Inspection Performed  Genitals / Buttocks / Groin Inspection Performed  Back Inspection Performed  RUE Inspected  LUE Inspection Performed  RLE Inspected  LLE Inspection Performed  Nails and Digits Inspection Performed                         Diagram Legend     Erythematous scaling macule/papule c/w actinic keratosis       Vascular papule c/w angioma      Pigmented verrucoid papule/plaque c/w seborrheic keratosis      Yellow umbilicated papule c/w sebaceous hyperplasia      Irregularly shaped tan macule c/w lentigo     1-2 mm smooth white papules consistent with Milia      Movable subcutaneous cyst with punctum c/w epidermal inclusion cyst      Subcutaneous movable cyst c/w pilar cyst      Firm pink to brown papule c/w dermatofibroma      Pedunculated fleshy papule(s) c/w skin tag(s)      Evenly pigmented macule c/w junctional nevus     Mildly variegated pigmented, slightly irregular-bordered macule c/w mildly atypical nevus      Flesh colored to evenly pigmented papule c/w intradermal nevus       Pink pearly papule/plaque c/w basal cell carcinoma      Erythematous hyperkeratotic cursted plaque c/w SCC      Surgical scar with no sign of skin cancer recurrence      Open and closed comedones      Inflammatory papules and pustules      Verrucoid papule consistent consistent with wart     Erythematous eczematous patches and plaques     Dystrophic onycholytic nail with subungual debris c/w onychomycosis     Umbilicated papule    Erythematous-base heme-crusted tan verrucoid plaque consistent with inflamed seborrheic keratosis     Erythematous Silvery Scaling Plaque  c/w Psoriasis     See annotation      Assessment / Plan:        Inflamed seborrheic keratosis, R ant shoulder, irritated by bra  Verbal consent obtained. 2 lesion(s) destroyed with electrodesiccation after anesthesia with 1% lidocaine with epinephrine. No complications.    Seborrheic keratoses  These are benign inherited growths without a malignant potential. Reassurance given to patient. No treatment is necessary.     Encounter for follow-up surveillance of skin cancer  Area of previous NMSCs examined. Sites well healed with no signs of recurrence.    Total body skin examination performed today including at least 12 points as noted in physical examination. No lesions suspicious for malignancy noted.    Cherry angioma  This is a benign vascular lesion. Reassurance given. No treatment required.     Multiple benign nevi  Careful dermoscopy evaluation of nevi performed with none identified as needing biopsy today  Monitor for new mole or moles that are becoming bigger, darker, irritated, or developing irregular borders.     Solar lentigo  This is a benign hyperpigmented sun induced lesion. Daily sun protection will reduce the number of new lesions. Treatment of these benign lesions are considered cosmetic.    Patient instructed in importance in daily broad spectrum sun protection of at least spf 30. Mineral sunscreen ingredients preferred (Zinc +/- Titanium) and can be found OTC.   Recommend Elta MD for daily use on face and neck.  Patient encouraged to wear hat for all outdoor exposure.   Also discussed sun avoidance and use of protective clothing.             Follow up in about 1 year (around 10/6/2024), or if symptoms worsen or fail to improve.

## 2024-02-28 ENCOUNTER — TELEPHONE (OUTPATIENT)
Dept: FAMILY MEDICINE | Facility: CLINIC | Age: 74
End: 2024-02-28
Payer: MEDICARE

## 2024-03-05 LAB
ALBUMIN SERPL-MCNC: 4.5 G/DL (ref 3.6–5.1)
ALBUMIN/CREAT UR: 5 MCG/MG CREAT
ALBUMIN/GLOB SERPL: 1.8 (CALC) (ref 1–2.5)
ALP SERPL-CCNC: 70 U/L (ref 37–153)
ALT SERPL-CCNC: 21 U/L (ref 6–29)
APPEARANCE UR: ABNORMAL
AST SERPL-CCNC: 18 U/L (ref 10–35)
BACTERIA #/AREA URNS HPF: ABNORMAL /HPF
BACTERIA UR CULT: ABNORMAL
BASOPHILS # BLD AUTO: 51 CELLS/UL (ref 0–200)
BASOPHILS NFR BLD AUTO: 1 %
BILIRUB SERPL-MCNC: 0.5 MG/DL (ref 0.2–1.2)
BILIRUB UR QL STRIP: NEGATIVE
BUN SERPL-MCNC: 16 MG/DL (ref 7–25)
BUN/CREAT SERPL: NORMAL (CALC) (ref 6–22)
CALCIUM SERPL-MCNC: 9.8 MG/DL (ref 8.6–10.4)
CHLORIDE SERPL-SCNC: 104 MMOL/L (ref 98–110)
CHOLEST SERPL-MCNC: 142 MG/DL
CHOLEST/HDLC SERPL: 2.4 (CALC)
CO2 SERPL-SCNC: 27 MMOL/L (ref 20–32)
COLOR UR: YELLOW
CREAT SERPL-MCNC: 0.75 MG/DL (ref 0.6–1)
CREAT UR-MCNC: 78 MG/DL (ref 20–275)
EGFR: 83 ML/MIN/1.73M2
EOSINOPHIL # BLD AUTO: 235 CELLS/UL (ref 15–500)
EOSINOPHIL NFR BLD AUTO: 4.6 %
ERYTHROCYTE [DISTWIDTH] IN BLOOD BY AUTOMATED COUNT: 12.6 % (ref 11–15)
GLOBULIN SER CALC-MCNC: 2.5 G/DL (CALC) (ref 1.9–3.7)
GLUCOSE SERPL-MCNC: 85 MG/DL (ref 65–99)
GLUCOSE UR QL STRIP: NEGATIVE
HCT VFR BLD AUTO: 45 % (ref 35–45)
HDLC SERPL-MCNC: 58 MG/DL
HGB BLD-MCNC: 14.9 G/DL (ref 11.7–15.5)
HGB UR QL STRIP: NEGATIVE
HYALINE CASTS #/AREA URNS LPF: ABNORMAL /LPF
KETONES UR QL STRIP: NEGATIVE
LDLC SERPL CALC-MCNC: 67 MG/DL (CALC)
LEUKOCYTE ESTERASE UR QL STRIP: NEGATIVE
LYMPHOCYTES # BLD AUTO: 1617 CELLS/UL (ref 850–3900)
LYMPHOCYTES NFR BLD AUTO: 31.7 %
MCH RBC QN AUTO: 29.3 PG (ref 27–33)
MCHC RBC AUTO-ENTMCNC: 33.1 G/DL (ref 32–36)
MCV RBC AUTO: 88.4 FL (ref 80–100)
MICROALBUMIN UR-MCNC: 0.4 MG/DL
MONOCYTES # BLD AUTO: 525 CELLS/UL (ref 200–950)
MONOCYTES NFR BLD AUTO: 10.3 %
NEUTROPHILS # BLD AUTO: 2672 CELLS/UL (ref 1500–7800)
NEUTROPHILS NFR BLD AUTO: 52.4 %
NITRITE UR QL STRIP: NEGATIVE
NONHDLC SERPL-MCNC: 84 MG/DL (CALC)
PH UR STRIP: 8 [PH] (ref 5–8)
PLATELET # BLD AUTO: 332 THOUSAND/UL (ref 140–400)
PMV BLD REES-ECKER: 11.7 FL (ref 7.5–12.5)
POTASSIUM SERPL-SCNC: 4.4 MMOL/L (ref 3.5–5.3)
PROT SERPL-MCNC: 7 G/DL (ref 6.1–8.1)
PROT UR QL STRIP: NEGATIVE
RBC # BLD AUTO: 5.09 MILLION/UL (ref 3.8–5.1)
RBC #/AREA URNS HPF: ABNORMAL /HPF
SERVICE CMNT-IMP: ABNORMAL
SODIUM SERPL-SCNC: 140 MMOL/L (ref 135–146)
SP GR UR STRIP: 1.01 (ref 1–1.03)
SQUAMOUS #/AREA URNS HPF: ABNORMAL /HPF
TRIGL SERPL-MCNC: 88 MG/DL
TSH SERPL-ACNC: 2.79 MIU/L (ref 0.4–4.5)
WBC # BLD AUTO: 5.1 THOUSAND/UL (ref 3.8–10.8)
WBC #/AREA URNS HPF: ABNORMAL /HPF

## 2024-03-10 NOTE — PROGRESS NOTES
SUBJECTIVE:    Patient ID: Shahana Mcknight is a 73 y.o. female.    Chief Complaint: Follow-up (6 mo f/u//brought med bottles//dexa and mammogram ordered//tc)      Pt here to checkup on acute and chronic conditions (HTN, Osteoporosis, Menopause, GERD, and hyperlipidemia, allergic rhinitis)    BP is elevated today.  Denies CP/SOB/ROD.  Saw (Festus) a month ago and BP was ok at that visit.     Continues to tolerate risendronate once a week for osteoporosis. (intolerance to Boniva, jaw problems and N/V)   Also taking calcium and vitamin D as well.      Heartburn has been bothering her more of late.   Has been drinking aloe gel that has helped.  Has had to take rolaids/tums yesterday, the first time in a week.  Watches diet, and avoid triggers    Allergies are doing ok. Has been using astelin and zyrtec.     Her right knee is doing better, it is getting better, though a little tight ([place])       Has had lab done: TSH 2.79, LDL 67, fBS 85, GFR 83, Ma/Cr 5    Stopped taking myrbetriq after her surgery during her surgery. Was told not to take it after the surgery because it makes her retain fluid. Thinks she needs to start wearing something again  -----------------------------------------------------  Continues to see Dr. Reza for her glaucoma.  Mammogram was 5/2023, to repeat in 1 yr. DEXA 3/2022, improved.  Cscope  5/2020 with Dr. Almeida, polyps, to repeat in 5 yrs    Plans to get her flu and covid vaccine.      No visits with results within 6 Month(s) from this visit.   Latest known visit with results is:   Office Visit on 09/11/2023   Component Date Value Ref Range Status    TSH w/reflex to FT4 03/04/2024 2.79  0.40 - 4.50 mIU/L Final    Color, UA 03/04/2024 YELLOW  YELLOW Final    Appearance, UA 03/04/2024 CLOUDY (A)  CLEAR Final    Specific Gravity, UA 03/04/2024 1.013  1.001 - 1.035 Final    pH, UA 03/04/2024 8.0  5.0 - 8.0 Final    Glucose, UA 03/04/2024 NEGATIVE  NEGATIVE Final    Bilirubin, UA  03/04/2024 NEGATIVE  NEGATIVE Final    Ketones, UA 03/04/2024 NEGATIVE  NEGATIVE Final    Occult Blood UA 03/04/2024 NEGATIVE  NEGATIVE Final    Protein, UA 03/04/2024 NEGATIVE  NEGATIVE Final    Nitrite, UA 03/04/2024 NEGATIVE  NEGATIVE Final    Leukocytes, UA 03/04/2024 NEGATIVE  NEGATIVE Final    WBC Casts, UA 03/04/2024 NONE SEEN  < OR = 5 /HPF Final    RBC Casts, UA 03/04/2024 0-2  < OR = 2 /HPF Final    Squam Epithel, UA 03/04/2024 NONE SEEN  < OR = 5 /HPF Final    Bacteria, UA 03/04/2024 NONE SEEN  NONE SEEN /HPF Final    Hyaline Casts, UA 03/04/2024 NONE SEEN  NONE SEEN /LPF Final    Service Cmt: 03/04/2024    Final    Comment: This urine was analyzed for the presence of WBC,   RBC, bacteria, casts, and other formed elements.   Only those elements seen were reported.            Reflexive Urine Culture 03/04/2024    Final    NO CULTURE INDICATED    WBC 03/04/2024 5.1  3.8 - 10.8 Thousand/uL Final    RBC 03/04/2024 5.09  3.80 - 5.10 Million/uL Final    Hemoglobin 03/04/2024 14.9  11.7 - 15.5 g/dL Final    Hematocrit 03/04/2024 45.0  35.0 - 45.0 % Final    MCV 03/04/2024 88.4  80.0 - 100.0 fL Final    MCH 03/04/2024 29.3  27.0 - 33.0 pg Final    MCHC 03/04/2024 33.1  32.0 - 36.0 g/dL Final    RDW 03/04/2024 12.6  11.0 - 15.0 % Final    Platelets 03/04/2024 332  140 - 400 Thousand/uL Final    MPV 03/04/2024 11.7  7.5 - 12.5 fL Final    Neutrophils, Abs 03/04/2024 2,672  1,500 - 7,800 cells/uL Final    Lymph # 03/04/2024 1,617  850 - 3,900 cells/uL Final    Mono # 03/04/2024 525  200 - 950 cells/uL Final    Eos # 03/04/2024 235  15 - 500 cells/uL Final    Baso # 03/04/2024 51  0 - 200 cells/uL Final    Neutrophils Relative 03/04/2024 52.4  % Final    Lymph % 03/04/2024 31.7  % Final    Mono % 03/04/2024 10.3  % Final    Eosinophil % 03/04/2024 4.6  % Final    Basophil % 03/04/2024 1.0  % Final    Cholesterol 03/04/2024 142  <200 mg/dL Final    HDL 03/04/2024 58  > OR = 50 mg/dL Final    Triglycerides 03/04/2024  88  <150 mg/dL Final    LDL Cholesterol 03/04/2024 67  mg/dL (calc) Final    Comment: Reference range: <100     Desirable range <100 mg/dL for primary prevention;    <70 mg/dL for patients with CHD or diabetic patients   with > or = 2 CHD risk factors.     LDL-C is now calculated using the Felicitas   calculation, which is a validated novel method providing   better accuracy than the Friedewald equation in the   estimation of LDL-C.   Sarabjit BALDERAS et al. JOSIAS. 2013;310(52): 1454-8183   (http://education.[employer]/faq/WWY931)      HDL/Cholesterol Ratio 03/04/2024 2.4  <5.0 (calc) Final    Non HDL Chol. (LDL+VLDL) 03/04/2024 84  <130 mg/dL (calc) Final    Comment: For patients with diabetes plus 1 major ASCVD risk   factor, treating to a non-HDL-C goal of <100 mg/dL   (LDL-C of <70 mg/dL) is considered a therapeutic   option.      Glucose 03/04/2024 85  65 - 99 mg/dL Final    Comment:               Fasting reference interval         BUN 03/04/2024 16  7 - 25 mg/dL Final    Creatinine 03/04/2024 0.75  0.60 - 1.00 mg/dL Final    eGFR 03/04/2024 83  > OR = 60 mL/min/1.73m2 Final    BUN/Creatinine Ratio 03/04/2024 SEE NOTE:  6 - 22 (calc) Final    Comment:    Not Reported: BUN and Creatinine are within     reference range.            Sodium 03/04/2024 140  135 - 146 mmol/L Final    Potassium 03/04/2024 4.4  3.5 - 5.3 mmol/L Final    Chloride 03/04/2024 104  98 - 110 mmol/L Final    CO2 03/04/2024 27  20 - 32 mmol/L Final    Calcium 03/04/2024 9.8  8.6 - 10.4 mg/dL Final    Total Protein 03/04/2024 7.0  6.1 - 8.1 g/dL Final    Albumin 03/04/2024 4.5  3.6 - 5.1 g/dL Final    Globulin, Total 03/04/2024 2.5  1.9 - 3.7 g/dL (calc) Final    Albumin/Globulin Ratio 03/04/2024 1.8  1.0 - 2.5 (calc) Final    Total Bilirubin 03/04/2024 0.5  0.2 - 1.2 mg/dL Final    Alkaline Phosphatase 03/04/2024 70  37 - 153 U/L Final    AST 03/04/2024 18  10 - 35 U/L Final    ALT 03/04/2024 21  6 - 29 U/L Final    Creatinine, Urine  03/04/2024 78  20 - 275 mg/dL Final    Microalb, Ur 03/04/2024 0.4  See Note: mg/dL Final    Comment: Reference Range:    Reference Range  Not established      Microalb/Creat Ratio 03/04/2024 5  <30 mcg/mg creat Final    Comment:    The ADA defines abnormalities in albumin  excretion as follows:     Albuminuria Category        Result (mcg/mg creatinine)     Normal to Mildly increased   <30  Moderately increased            Severely increased           > OR = 300     The ADA recommends that at least two of three  specimens collected within a 3-6 month period be  abnormal before considering a patient to be  within a diagnostic category.             Past Medical History:   Diagnosis Date    Basal cell carcinoma 2005    R upper cut lip    Hyperlipidemia     Osteopenia      Social History     Socioeconomic History    Marital status:    Tobacco Use    Smoking status: Former    Smokeless tobacco: Never   Substance and Sexual Activity    Alcohol use: Yes     Comment: socially    Drug use: No    Sexual activity: Yes     Partners: Male     Social Determinants of Health     Financial Resource Strain: Low Risk  (3/9/2024)    Overall Financial Resource Strain (CARDIA)     Difficulty of Paying Living Expenses: Not hard at all   Food Insecurity: No Food Insecurity (3/9/2024)    Hunger Vital Sign     Worried About Running Out of Food in the Last Year: Never true     Ran Out of Food in the Last Year: Never true   Transportation Needs: No Transportation Needs (3/9/2024)    PRAPARE - Transportation     Lack of Transportation (Medical): No     Lack of Transportation (Non-Medical): No   Physical Activity: Sufficiently Active (3/9/2024)    Exercise Vital Sign     Days of Exercise per Week: 5 days     Minutes of Exercise per Session: 30 min   Stress: No Stress Concern Present (3/9/2024)    [employer] - Occupational Stress Questionnaire     Feeling of Stress : Not at all   Social Connections:  Unknown (3/9/2024)    Social Connection and Isolation Panel [NHANES]     Frequency of Communication with Friends and Family: More than three times a week     Frequency of Social Gatherings with Friends and Family: Once a week     Active Member of Clubs or Organizations: Yes     Attends Club or Organization Meetings: 1 to 4 times per year     Marital Status:    Housing Stability: Low Risk  (3/9/2024)    Housing Stability Vital Sign     Unable to Pay for Housing in the Last Year: No     Number of Places Lived in the Last Year: 1     Unstable Housing in the Last Year: No     Past Surgical History:   Procedure Laterality Date    basil cell carcinoma removal      BREAST CYST ASPIRATION      HYSTERECTOMY      SINUS SURGERY      TOTAL KNEE ARTHROPLASTY Right 06/27/2023     Family History   Problem Relation Age of Onset    Cancer Mother     Melanoma Neg Hx     Psoriasis Neg Hx     Lupus Neg Hx     Eczema Neg Hx        Review of patient's allergies indicates:   Allergen Reactions    Alendronic acid     Boniva [ibandronate]      Nausea and vomitting    Fosamax [alendronate] Nausea And Vomiting    Hydrocodone-acetaminophen     Hydrocodone-cpm-pseudoephed     Lovastatin     Oxycodone     Rosuvastatin        Current Outpatient Medications:     ascorbic acid, vitamin C, (VITAMIN C) 500 MG tablet, Take 500 mg by mouth 2 (two) times daily., Disp: , Rfl:     azelastine (ASTELIN) 137 mcg (0.1 %) nasal spray, 1 spray (137 mcg total) by Nasal route 2 (two) times daily., Disp: 30 mL, Rfl: 5    b complex vitamins tablet, Take 1 tablet by mouth once daily., Disp: , Rfl:     b complex vitamins tablet, Take 1 tablet by mouth once daily., Disp: , Rfl:     calcium citrate (CALCITRATE) 200 mg (950 mg) tablet, Take 1 tablet by mouth once daily., Disp: , Rfl:     cetirizine (ZYRTEC) 10 MG tablet, Take 10 mg by mouth once daily., Disp: , Rfl:     co-enzyme Q-10 30 mg capsule, Take 100 mg by mouth 2 (two) times daily., Disp: , Rfl:      ergocalciferol (VITAMIN D2) 50,000 unit Cap, Take 50,000 Units by mouth every 7 days., Disp: , Rfl:     flaxseed oiL 1,000 mg Cap, Take by mouth., Disp: , Rfl:     folic acid (FOLVITE) 400 MCG tablet, Take 400 mcg by mouth once daily., Disp: , Rfl:     hydroCHLOROthiazide (HYDRODIURIL) 12.5 MG Tab, Take 1 tablet (12.5 mg total) by mouth once daily., Disp: 90 tablet, Rfl: 3    latanoprost, PF, 0.005 % Drop, Apply to eye., Disp: , Rfl:     LIVALO 4 mg Tab, Take 1 tablet by mouth once daily. For 30 days, Disp: , Rfl:     lysine (L-LYSINE) 500 mg Tab, Take 500 mg by mouth once daily., Disp: , Rfl:     magnesium 30 mg Tab, Take by mouth once., Disp: , Rfl:     omega-3 fatty acids/fish oil (FISH OIL-OMEGA-3 FATTY ACIDS) 300-1,000 mg capsule, Take by mouth once daily., Disp: , Rfl:     phytonadione, vit K1, (PHYTONADIONE, VITAMIN K1,) 100 mcg Tab, Take by mouth., Disp: , Rfl:     potassium bicarbonate (K-LYTE) disintegrating tablet, Take 99 mEq by mouth 2 (two) times daily., Disp: , Rfl:     zinc gluconate 50 mg tablet, Take 50 mg by mouth once daily., Disp: , Rfl:     risedronate (ACTONEL) 35 MG tablet, Take 1 tablet (35 mg total) by mouth every 7 days., Disp: 12 tablet, Rfl: 3    vibegron 75 mg Tab, Take 1 tablet (75 mg total) by mouth Daily., Disp: 30 tablet, Rfl: 5    Review of Systems   Constitutional:  Negative for activity change, appetite change, fatigue, fever and unexpected weight change.   HENT:  Negative for ear pain, hearing loss, postnasal drip, sinus pain, sore throat and trouble swallowing.    Eyes:  Negative for discharge and visual disturbance.   Respiratory:  Negative for cough, chest tightness, shortness of breath and wheezing.    Cardiovascular:  Negative for chest pain, palpitations and leg swelling.   Gastrointestinal:  Negative for abdominal pain, blood in stool, constipation, diarrhea, nausea and vomiting.        -heartburn   Endocrine: Positive for polyuria. Negative for polydipsia.  "  Genitourinary:  Negative for difficulty urinating, dysuria, frequency, hematuria and menstrual problem.   Musculoskeletal:  Negative for arthralgias, back pain, joint swelling, myalgias and neck pain.   Skin:  Negative for rash.   Neurological:  Negative for weakness, numbness and headaches.   Hematological:  Does not bruise/bleed easily.   Psychiatric/Behavioral:  Negative for confusion, dysphoric mood, sleep disturbance and suicidal ideas. The patient is not nervous/anxious.           Objective:      Vitals:    03/11/24 0756 03/11/24 0808 03/11/24 0833   BP: (!) 142/80 (!) 154/82 130/78   Pulse: 67     Weight: 65.8 kg (145 lb)     Height: 5' 2" (1.575 m)         Wt Readings from Last 3 Encounters:   03/11/24 65.8 kg (145 lb)   10/06/23 64 kg (141 lb)   09/11/23 64 kg (141 lb)         Physical Exam  Vitals reviewed.   Constitutional:       General: She is not in acute distress.     Appearance: Normal appearance. She is well-developed and overweight.   HENT:      Head: Normocephalic and atraumatic.   Neck:      Thyroid: No thyromegaly.   Cardiovascular:      Rate and Rhythm: Normal rate and regular rhythm.      Heart sounds: Normal heart sounds. No murmur heard.     No friction rub.   Pulmonary:      Effort: Pulmonary effort is normal.      Breath sounds: Normal breath sounds. No wheezing or rales.   Abdominal:      General: Bowel sounds are normal. There is no distension.      Palpations: Abdomen is soft.      Tenderness: There is no abdominal tenderness.   Musculoskeletal:      Cervical back: Neck supple.   Lymphadenopathy:      Cervical: No cervical adenopathy.   Skin:     General: Skin is warm and dry.      Findings: No rash.   Neurological:      Mental Status: She is alert and oriented to person, place, and time.   Psychiatric:         Attention and Perception: She is attentive.         Speech: Speech normal.         Behavior: Behavior normal.         Thought Content: Thought content normal.         Judgment: " Judgment normal.           Assessment:       1. Essential hypertension    2. Mixed hyperlipidemia    3. Age-related osteoporosis without current pathological fracture    4. Gastroesophageal reflux disease without esophagitis    5. Urge incontinence    6. Menopause    7. Osteoporosis, unspecified osteoporosis type, unspecified pathological fracture presence    8. Other screening mammogram             Plan:       Essential hypertension  Comments:  Controlled. Will continue to monitor BP on current medication regimen    Mixed hyperlipidemia  Comments:  Controlled. LDL 67. To continue statin.    Age-related osteoporosis without current pathological fracture  Comments:  Chronic. Improved. To continue to take risendronate.   Orders:  -     risedronate (ACTONEL) 35 MG tablet; Take 1 tablet (35 mg total) by mouth every 7 days.  Dispense: 12 tablet; Refill: 3    Gastroesophageal reflux disease without esophagitis  Comments:  Controlled. Will continue to monitor symptoms    Urge incontinence  Comments:  Symptomatic. Will try on gemtesa  Orders:  -     vibegron 75 mg Tab; Take 1 tablet (75 mg total) by mouth Daily.  Dispense: 30 tablet; Refill: 5    Menopause  Comments:  DEXA order sent to [facility]  Orders:  -     DXA Bone Density Axial Skeleton 1 or more sites; Future; Expected date: 03/11/2024    Osteoporosis, unspecified osteoporosis type, unspecified pathological fracture presence  -     DXA Bone Density Axial Skeleton 1 or more sites; Future; Expected date: 03/11/2024    Other screening mammogram  -     Mammo Digital Screening Bilat w/ Sid; Future; Expected date: 03/11/2024      Other  Lab results discussed and reviewed with patient.      Follow up in about 6 months (around 9/11/2024) for HTN, GERD, OAB.        3/11/2024 Jeanmarie Yoder

## 2024-03-11 ENCOUNTER — OFFICE VISIT (OUTPATIENT)
Dept: FAMILY MEDICINE | Facility: CLINIC | Age: 74
End: 2024-03-11
Payer: MEDICARE

## 2024-03-11 VITALS
HEART RATE: 67 BPM | BODY MASS INDEX: 26.68 KG/M2 | WEIGHT: 145 LBS | HEIGHT: 62 IN | SYSTOLIC BLOOD PRESSURE: 130 MMHG | DIASTOLIC BLOOD PRESSURE: 78 MMHG

## 2024-03-11 DIAGNOSIS — Z12.31 OTHER SCREENING MAMMOGRAM: ICD-10-CM

## 2024-03-11 DIAGNOSIS — I10 ESSENTIAL HYPERTENSION: Primary | ICD-10-CM

## 2024-03-11 DIAGNOSIS — Z78.0 MENOPAUSE: ICD-10-CM

## 2024-03-11 DIAGNOSIS — M81.0 OSTEOPOROSIS, UNSPECIFIED OSTEOPOROSIS TYPE, UNSPECIFIED PATHOLOGICAL FRACTURE PRESENCE: ICD-10-CM

## 2024-03-11 DIAGNOSIS — M81.0 AGE-RELATED OSTEOPOROSIS WITHOUT CURRENT PATHOLOGICAL FRACTURE: ICD-10-CM

## 2024-03-11 DIAGNOSIS — K21.9 GASTROESOPHAGEAL REFLUX DISEASE WITHOUT ESOPHAGITIS: ICD-10-CM

## 2024-03-11 DIAGNOSIS — N39.41 URGE INCONTINENCE: ICD-10-CM

## 2024-03-11 DIAGNOSIS — E78.2 MIXED HYPERLIPIDEMIA: ICD-10-CM

## 2024-03-11 PROCEDURE — 99214 OFFICE O/P EST MOD 30 MIN: CPT | Mod: S$GLB,,, | Performed by: FAMILY MEDICINE

## 2024-03-11 RX ORDER — MULTIVIT WITH MINERALS/HERBS
1 TABLET ORAL DAILY
COMMUNITY

## 2024-03-11 RX ORDER — FLAXSEED OIL 1000 MG
CAPSULE ORAL
COMMUNITY

## 2024-03-11 RX ORDER — CETIRIZINE HYDROCHLORIDE 10 MG/1
10 TABLET ORAL DAILY
COMMUNITY

## 2024-03-11 RX ORDER — ASCORBIC ACID 500 MG
500 TABLET ORAL 2 TIMES DAILY
COMMUNITY

## 2024-03-11 RX ORDER — FOLIC ACID 0.4 MG
400 TABLET ORAL DAILY
COMMUNITY

## 2024-03-11 RX ORDER — UBIDECARENONE 30 MG
100 CAPSULE ORAL 2 TIMES DAILY
COMMUNITY

## 2024-03-11 RX ORDER — IBUPROFEN 200 MG
1 CAPSULE ORAL DAILY
COMMUNITY

## 2024-03-11 RX ORDER — AMOXICILLIN 500 MG
CAPSULE ORAL DAILY
COMMUNITY

## 2024-03-11 RX ORDER — ERGOCALCIFEROL 1.25 MG/1
50000 CAPSULE ORAL
COMMUNITY

## 2024-03-11 RX ORDER — RISEDRONATE SODIUM 35 MG/1
35 TABLET, FILM COATED ORAL
Qty: 12 TABLET | Refills: 3 | Status: SHIPPED | OUTPATIENT
Start: 2024-03-11

## 2024-03-11 RX ORDER — ZINC GLUCONATE 50 MG
50 TABLET ORAL DAILY
COMMUNITY

## 2024-03-11 RX ORDER — ZINC GLUCONATE 100 MG
TABLET ORAL
COMMUNITY
End: 2024-05-01

## 2024-03-11 RX ORDER — DIAPER,BRIEF,ADULT, DISPOSABLE
500 EACH MISCELLANEOUS DAILY
COMMUNITY

## 2024-03-13 DIAGNOSIS — N39.41 URGE INCONTINENCE: Primary | ICD-10-CM

## 2024-03-13 RX ORDER — MIRABEGRON 25 MG/1
25 TABLET, FILM COATED, EXTENDED RELEASE ORAL DAILY
Qty: 30 TABLET | Refills: 5 | Status: SHIPPED | OUTPATIENT
Start: 2024-03-13

## 2024-03-13 NOTE — TELEPHONE ENCOUNTER
----- Message from Nati Rowell sent at 3/13/2024  9:57 AM CDT -----  Pt was in on Monday and the medication with a g is it to expensive. Would like to know what to do or if she should get back on myrbetriq   [phone]

## 2024-03-13 NOTE — TELEPHONE ENCOUNTER
prescription sent to   [id number] - [place] [address] - [address]  [address]  [place] [id number]  Phone: [phone] Fax: [phone]

## 2024-03-13 NOTE — TELEPHONE ENCOUNTER
Pt states Vibegron is over 100 dollars a month. She can not afford it. Pt would like to go back to Myrbetriq.   Rx set up if ok.

## 2024-04-19 ENCOUNTER — TELEPHONE (OUTPATIENT)
Dept: FAMILY MEDICINE | Facility: CLINIC | Age: 74
End: 2024-04-19
Payer: MEDICARE

## 2024-05-01 ENCOUNTER — TELEPHONE (OUTPATIENT)
Dept: FAMILY MEDICINE | Facility: CLINIC | Age: 74
End: 2024-05-01

## 2024-05-01 ENCOUNTER — OFFICE VISIT (OUTPATIENT)
Dept: FAMILY MEDICINE | Facility: CLINIC | Age: 74
End: 2024-05-01
Payer: MEDICARE

## 2024-05-01 VITALS
RESPIRATION RATE: 18 BRPM | BODY MASS INDEX: 26.31 KG/M2 | HEIGHT: 62 IN | SYSTOLIC BLOOD PRESSURE: 126 MMHG | WEIGHT: 143 LBS | HEART RATE: 68 BPM | DIASTOLIC BLOOD PRESSURE: 68 MMHG | OXYGEN SATURATION: 97 %

## 2024-05-01 DIAGNOSIS — E78.01 FAMILIAL HYPERCHOLESTEROLEMIA: ICD-10-CM

## 2024-05-01 DIAGNOSIS — K21.9 GASTROESOPHAGEAL REFLUX DISEASE WITHOUT ESOPHAGITIS: ICD-10-CM

## 2024-05-01 DIAGNOSIS — Z01.818 PREOPERATIVE CLEARANCE: Primary | ICD-10-CM

## 2024-05-01 DIAGNOSIS — I10 ESSENTIAL HYPERTENSION: ICD-10-CM

## 2024-05-01 PROBLEM — M19.079 PRIMARY OSTEOARTHRITIS OF ANKLE: Status: ACTIVE | Noted: 2024-05-01

## 2024-05-01 PROBLEM — I34.1 MVP (MITRAL VALVE PROLAPSE): Status: ACTIVE | Noted: 2023-11-30

## 2024-05-01 PROCEDURE — 1126F AMNT PAIN NOTED NONE PRSNT: CPT | Mod: CPTII,S$GLB,,

## 2024-05-01 PROCEDURE — 99214 OFFICE O/P EST MOD 30 MIN: CPT | Mod: S$GLB,,,

## 2024-05-01 PROCEDURE — 3288F FALL RISK ASSESSMENT DOCD: CPT | Mod: CPTII,S$GLB,,

## 2024-05-01 PROCEDURE — 3074F SYST BP LT 130 MM HG: CPT | Mod: CPTII,S$GLB,,

## 2024-05-01 PROCEDURE — 3078F DIAST BP <80 MM HG: CPT | Mod: CPTII,S$GLB,,

## 2024-05-01 PROCEDURE — 1101F PT FALLS ASSESS-DOCD LE1/YR: CPT | Mod: CPTII,S$GLB,,

## 2024-05-01 PROCEDURE — 1159F MED LIST DOCD IN RCRD: CPT | Mod: CPTII,S$GLB,,

## 2024-05-01 RX ORDER — ASPIRIN 81 MG/1
81 TABLET ORAL DAILY
COMMUNITY

## 2024-05-01 NOTE — PROGRESS NOTES
SUBJECTIVE:    Patient ID: Shahana Mcknight is a 73 y.o. female.    Chief Complaint: Follow-up (No bottles// no refills needed// pt is here today for right hand surgery clearance scheduled for 04/07/ labs and EKG was already done )    73 year old established female patient presents today for surgical clearance. She is scheduled to have ight endoscopic carpal tunnel release with Dr Ma at [facility] on 5/7/24.    Had abnormal EKG at preop visit, and was told she needed cardiac clearance.she states she sees Dr Mortensen. Got clearance from him already.      Follow-up  Pertinent negatives include no arthralgias, chest pain, headaches, joint swelling, neck pain, vomiting or weakness.       No visits with results within 6 Month(s) from this visit.   Latest known visit with results is:   Office Visit on 09/11/2023   Component Date Value Ref Range Status    TSH w/reflex to FT4 03/04/2024 2.79  0.40 - 4.50 mIU/L Final    Color, UA 03/04/2024 YELLOW  YELLOW Final    Appearance, UA 03/04/2024 CLOUDY (A)  CLEAR Final    Specific Gravity, UA 03/04/2024 1.013  1.001 - 1.035 Final    pH, UA 03/04/2024 8.0  5.0 - 8.0 Final    Glucose, UA 03/04/2024 NEGATIVE  NEGATIVE Final    Bilirubin, UA 03/04/2024 NEGATIVE  NEGATIVE Final    Ketones, UA 03/04/2024 NEGATIVE  NEGATIVE Final    Occult Blood UA 03/04/2024 NEGATIVE  NEGATIVE Final    Protein, UA 03/04/2024 NEGATIVE  NEGATIVE Final    Nitrite, UA 03/04/2024 NEGATIVE  NEGATIVE Final    Leukocytes, UA 03/04/2024 NEGATIVE  NEGATIVE Final    WBC Casts, UA 03/04/2024 NONE SEEN  < OR = 5 /HPF Final    RBC Casts, UA 03/04/2024 0-2  < OR = 2 /HPF Final    Squam Epithel, UA 03/04/2024 NONE SEEN  < OR = 5 /HPF Final    Bacteria, UA 03/04/2024 NONE SEEN  NONE SEEN /HPF Final    Hyaline Casts, UA 03/04/2024 NONE SEEN  NONE SEEN /LPF Final    Service Cmt: 03/04/2024    Final    Reflexive Urine Culture 03/04/2024    Final    WBC 03/04/2024 5.1  3.8 - 10.8 Thousand/uL Final    RBC 03/04/2024 5.09   3.80 - 5.10 Million/uL Final    Hemoglobin 03/04/2024 14.9  11.7 - 15.5 g/dL Final    Hematocrit 03/04/2024 45.0  35.0 - 45.0 % Final    MCV 03/04/2024 88.4  80.0 - 100.0 fL Final    MCH 03/04/2024 29.3  27.0 - 33.0 pg Final    MCHC 03/04/2024 33.1  32.0 - 36.0 g/dL Final    RDW 03/04/2024 12.6  11.0 - 15.0 % Final    Platelets 03/04/2024 332  140 - 400 Thousand/uL Final    MPV 03/04/2024 11.7  7.5 - 12.5 fL Final    Neutrophils, Abs 03/04/2024 2,672  1,500 - 7,800 cells/uL Final    Lymph # 03/04/2024 1,617  850 - 3,900 cells/uL Final    Mono # 03/04/2024 525  200 - 950 cells/uL Final    Eos # 03/04/2024 235  15 - 500 cells/uL Final    Baso # 03/04/2024 51  0 - 200 cells/uL Final    Neutrophils Relative 03/04/2024 52.4  % Final    Lymph % 03/04/2024 31.7  % Final    Mono % 03/04/2024 10.3  % Final    Eosinophil % 03/04/2024 4.6  % Final    Basophil % 03/04/2024 1.0  % Final    Cholesterol 03/04/2024 142  <200 mg/dL Final    HDL 03/04/2024 58  > OR = 50 mg/dL Final    Triglycerides 03/04/2024 88  <150 mg/dL Final    LDL Cholesterol 03/04/2024 67  mg/dL (calc) Final    HDL/Cholesterol Ratio 03/04/2024 2.4  <5.0 (calc) Final    Non HDL Chol. (LDL+VLDL) 03/04/2024 84  <130 mg/dL (calc) Final    Glucose 03/04/2024 85  65 - 99 mg/dL Final    BUN 03/04/2024 16  7 - 25 mg/dL Final    Creatinine 03/04/2024 0.75  0.60 - 1.00 mg/dL Final    eGFR 03/04/2024 83  > OR = 60 mL/min/1.73m2 Final    BUN/Creatinine Ratio 03/04/2024 SEE NOTE:  6 - 22 (calc) Final    Sodium 03/04/2024 140  135 - 146 mmol/L Final    Potassium 03/04/2024 4.4  3.5 - 5.3 mmol/L Final    Chloride 03/04/2024 104  98 - 110 mmol/L Final    CO2 03/04/2024 27  20 - 32 mmol/L Final    Calcium 03/04/2024 9.8  8.6 - 10.4 mg/dL Final    Total Protein 03/04/2024 7.0  6.1 - 8.1 g/dL Final    Albumin 03/04/2024 4.5  3.6 - 5.1 g/dL Final    Globulin, Total 03/04/2024 2.5  1.9 - 3.7 g/dL (calc) Final    Albumin/Globulin Ratio 03/04/2024 1.8  1.0 - 2.5 (calc) Final     Total Bilirubin 03/04/2024 0.5  0.2 - 1.2 mg/dL Final    Alkaline Phosphatase 03/04/2024 70  37 - 153 U/L Final    AST 03/04/2024 18  10 - 35 U/L Final    ALT 03/04/2024 21  6 - 29 U/L Final    Creatinine, Urine 03/04/2024 78  20 - 275 mg/dL Final    Microalb, Ur 03/04/2024 0.4  See Note: mg/dL Final    Microalb/Creat Ratio 03/04/2024 5  <30 mcg/mg creat Final       Past Medical History:   Diagnosis Date    Basal cell carcinoma 2005    R upper cut lip    Hyperlipidemia     Osteopenia      Social History     Socioeconomic History    Marital status:    Tobacco Use    Smoking status: Former    Smokeless tobacco: Never   Substance and Sexual Activity    Alcohol use: Yes     Comment: socially    Drug use: No    Sexual activity: Yes     Partners: Male     Social Determinants of Health     Financial Resource Strain: Low Risk  (3/9/2024)    Overall Financial Resource Strain (CARDIA)     Difficulty of Paying Living Expenses: Not hard at all   Food Insecurity: No Food Insecurity (3/9/2024)    Hunger Vital Sign     Worried About Running Out of Food in the Last Year: Never true     Ran Out of Food in the Last Year: Never true   Transportation Needs: No Transportation Needs (3/9/2024)    PRAPARE - Transportation     Lack of Transportation (Medical): No     Lack of Transportation (Non-Medical): No   Physical Activity: Sufficiently Active (3/9/2024)    Exercise Vital Sign     Days of Exercise per Week: 5 days     Minutes of Exercise per Session: 30 min   Stress: No Stress Concern Present (3/9/2024)    [employer] [place] [employer] - Occupational Stress Questionnaire     Feeling of Stress : Not at all   Housing Stability: Low Risk  (3/9/2024)    Housing Stability Vital Sign     Unable to Pay for Housing in the Last Year: No     Number of Places Lived in the Last Year: 1     Unstable Housing in the Last Year: No     Past Surgical History:   Procedure Laterality Date    basil cell carcinoma removal      BREAST CYST  ASPIRATION      HYSTERECTOMY      SINUS SURGERY      TOTAL KNEE ARTHROPLASTY Right 06/27/2023     Family History   Problem Relation Name Age of Onset    Cancer Mother      Melanoma Neg Hx      Psoriasis Neg Hx      Lupus Neg Hx      Eczema Neg Hx         Review of patient's allergies indicates:   Allergen Reactions    Alendronic acid     Boniva [ibandronate]      Nausea and vomitting    Fosamax [alendronate] Nausea And Vomiting    Hydrocodone-acetaminophen     Hydrocodone-cpm-pseudoephed     Lovastatin     Oxycodone     Rosuvastatin     Solifenacin Other (See Comments)       Current Outpatient Medications:     ascorbic acid, vitamin C, (VITAMIN C) 500 MG tablet, Take 500 mg by mouth 2 (two) times daily., Disp: , Rfl:     aspirin (ECOTRIN) 81 MG EC tablet, Take 81 mg by mouth once daily., Disp: , Rfl:     azelastine (ASTELIN) 137 mcg (0.1 %) nasal spray, 1 spray (137 mcg total) by Nasal route 2 (two) times daily., Disp: 30 mL, Rfl: 5    b complex vitamins tablet, Take 1 tablet by mouth once daily., Disp: , Rfl:     b complex vitamins tablet, Take 1 tablet by mouth once daily., Disp: , Rfl:     calcium citrate (CALCITRATE) 200 mg (950 mg) tablet, Take 1 tablet by mouth once daily., Disp: , Rfl:     cetirizine (ZYRTEC) 10 MG tablet, Take 10 mg by mouth once daily., Disp: , Rfl:     co-enzyme Q-10 30 mg capsule, Take 100 mg by mouth 2 (two) times daily., Disp: , Rfl:     ergocalciferol (VITAMIN D2) 50,000 unit Cap, Take 50,000 Units by mouth every 7 days., Disp: , Rfl:     flaxseed oiL 1,000 mg Cap, Take by mouth., Disp: , Rfl:     folic acid (FOLVITE) 400 MCG tablet, Take 400 mcg by mouth once daily., Disp: , Rfl:     hydroCHLOROthiazide (HYDRODIURIL) 12.5 MG Tab, Take 1 tablet (12.5 mg total) by mouth once daily., Disp: 90 tablet, Rfl: 3    latanoprost, PF, 0.005 % Drop, Apply to eye., Disp: , Rfl:     LIVALO 4 mg Tab, Take 1 tablet by mouth once daily. For 30 days, Disp: , Rfl:     lysine (L-LYSINE) 500 mg Tab, Take  "500 mg by mouth once daily., Disp: , Rfl:     magnesium 30 mg Tab, Take by mouth once., Disp: , Rfl:     mirabegron (MYRBETRIQ) 25 mg Tb24 ER tablet, Take 1 tablet (25 mg total) by mouth once daily., Disp: 30 tablet, Rfl: 5    omega-3 fatty acids/fish oil (FISH OIL-OMEGA-3 FATTY ACIDS) 300-1,000 mg capsule, Take by mouth once daily., Disp: , Rfl:     potassium bicarbonate (K-LYTE) disintegrating tablet, Take 99 mEq by mouth 2 (two) times daily., Disp: , Rfl:     risedronate (ACTONEL) 35 MG tablet, Take 1 tablet (35 mg total) by mouth every 7 days., Disp: 12 tablet, Rfl: 3    VITAMIN K2, MK-4, ORAL, Take by mouth., Disp: , Rfl:     zinc gluconate 50 mg tablet, Take 50 mg by mouth once daily., Disp: , Rfl:     Review of Systems   Constitutional:  Negative for activity change and unexpected weight change.   HENT:  Negative for hearing loss, rhinorrhea and trouble swallowing.    Eyes:  Negative for discharge and visual disturbance.   Respiratory:  Negative for chest tightness and wheezing.    Cardiovascular:  Negative for chest pain and palpitations.   Gastrointestinal:  Negative for blood in stool, constipation, diarrhea and vomiting.   Endocrine: Negative for polydipsia and polyuria.   Genitourinary:  Negative for difficulty urinating, dysuria, hematuria and menstrual problem.   Musculoskeletal:  Negative for arthralgias, joint swelling and neck pain.   Neurological:  Negative for weakness and headaches.   Psychiatric/Behavioral:  Negative for confusion and dysphoric mood.            Objective:      Vitals:    05/01/24 1157   BP: 126/68   Pulse: 68   Resp: 18   SpO2: 97%   Weight: 64.9 kg (143 lb)   Height: 5' 2" (1.575 m)     Physical Exam  Vitals reviewed.   Constitutional:       General: She is not in acute distress.     Appearance: Normal appearance. She is well-developed and overweight.   HENT:      Head: Normocephalic and atraumatic.      Nose: Nose normal.      Mouth/Throat:      Pharynx: Oropharynx is clear. "   Eyes:      General: No scleral icterus.     Pupils: Pupils are equal, round, and reactive to light.   Neck:      Thyroid: No thyromegaly.      Vascular: No carotid bruit.   Cardiovascular:      Rate and Rhythm: Normal rate and regular rhythm.      Heart sounds: Normal heart sounds. No murmur heard.     No friction rub.   Pulmonary:      Effort: Pulmonary effort is normal.      Breath sounds: Normal breath sounds. No wheezing or rales.   Abdominal:      General: There is no distension.      Palpations: Abdomen is soft.      Tenderness: There is no abdominal tenderness.   Musculoskeletal:      Cervical back: Neck supple.      Right lower leg: No edema.      Left lower leg: No edema.   Skin:     General: Skin is warm and dry.      Capillary Refill: Capillary refill takes less than 2 seconds.      Findings: No rash.   Neurological:      Mental Status: She is alert and oriented to person, place, and time.      Gait: Gait normal.   Psychiatric:         Attention and Perception: She is attentive.         Mood and Affect: Mood normal.         Speech: Speech normal.           Assessment:       1. Preoperative clearance    2. Essential hypertension    3. Familial hypercholesterolemia    4. Gastroesophageal reflux disease without esophagitis         Plan:       Preoperative clearance  Comments:  cleared for surgery since has been cleared by cardiology    Essential hypertension  Comments:  well controlled. continue as is.    Familial hypercholesterolemia  Comments:  stable. continue as is.    Gastroesophageal reflux disease without esophagitis  Comments:  symptoms well controlled.      Follow up for follow up with Dr. Yoder as scheduled.        5/5/2024 Nati Becerril

## 2024-05-01 NOTE — TELEPHONE ENCOUNTER
Requested pt's most recent EKG and lab from [facility]. Spoke with Trang with medical records with [facility]. Requested pt's most recent EKG and labs. Trang stated that she will give this information to the nurse to fax over to our office. Gave Trang our office fax number.     Reminder set up.     Telephone number [phone].

## 2024-05-01 NOTE — PATIENT INSTRUCTIONS
Stop aspirin, flax, fish oil, CoQ-10, and do not take ibuprofen, naproxen until after surgery    May take Tylenol as needed.

## 2024-05-01 NOTE — TELEPHONE ENCOUNTER
----- Message from MARISOL Keys sent at 5/1/2024 12:30 PM CDT -----  Please get EKG and lab results asap from [facility].

## 2024-05-24 ENCOUNTER — HOSPITAL ENCOUNTER (OUTPATIENT)
Dept: RADIOLOGY | Facility: HOSPITAL | Age: 74
Discharge: HOME | End: 2024-05-24
Attending: FAMILY MEDICINE
Payer: MEDICARE

## 2024-05-24 VITALS — BODY MASS INDEX: 26.33 KG/M2 | HEIGHT: 62 IN | WEIGHT: 143.06 LBS

## 2024-05-24 DIAGNOSIS — M81.0 OSTEOPOROSIS, UNSPECIFIED OSTEOPOROSIS TYPE, UNSPECIFIED PATHOLOGICAL FRACTURE PRESENCE: ICD-10-CM

## 2024-05-24 DIAGNOSIS — Z78.0 MENOPAUSE: ICD-10-CM

## 2024-05-24 DIAGNOSIS — Z12.31 OTHER SCREENING MAMMOGRAM: ICD-10-CM

## 2024-05-24 PROCEDURE — 77063 BREAST TOMOSYNTHESIS BI: CPT | Mod: TC,PO

## 2024-05-24 PROCEDURE — 77063 BREAST TOMOSYNTHESIS BI: CPT | Mod: 26,,, | Performed by: RADIOLOGY

## 2024-05-24 PROCEDURE — 77080 DXA BONE DENSITY AXIAL: CPT | Mod: 26,,, | Performed by: RADIOLOGY

## 2024-05-24 PROCEDURE — 77080 DXA BONE DENSITY AXIAL: CPT | Mod: TC,PO

## 2024-05-24 PROCEDURE — 77067 SCR MAMMO BI INCL CAD: CPT | Mod: 26,,, | Performed by: RADIOLOGY

## 2024-05-24 PROCEDURE — 77067 SCR MAMMO BI INCL CAD: CPT | Mod: TC,PO

## 2024-05-28 DIAGNOSIS — Z00.00 ENCOUNTER FOR MEDICARE ANNUAL WELLNESS EXAM: ICD-10-CM

## 2024-09-12 ENCOUNTER — TELEPHONE (OUTPATIENT)
Dept: FAMILY MEDICINE | Facility: CLINIC | Age: 74
End: 2024-09-12
Payer: MEDICARE

## 2024-09-12 NOTE — TELEPHONE ENCOUNTER
----- Message from Hue Muhammad sent at 9/12/2024  2:51 PM CDT -----  Pt needs to reschedule her appointment that was canceled due to the weather.   [phone]

## 2024-09-21 NOTE — PROGRESS NOTES
SUBJECTIVE:    Patient ID: Shahana Mcknight is a 74 y.o. female.    Chief Complaint: Follow-up (4 mo f/u//no med bottles//tc)      Pt here to checkup on acute and chronic conditions (HTN, Osteoporosis, Menopause, GERD, and hyperlipidemia, allergic rhinitis)    BP is elevated today.  Denies CP/SOB/ROD. (Mortensen) BP has been ok at home.     S/p CTS surgery 8/31/2024. It is feeling good. Can drive without it getting numb.       Heartburn has been better.  Watches diet, and avoid triggers    Allergies are doing good. Has been sneezing the last few days. Has been out more in the evening. Has been using astelin as needed and zyrtec.     Her right knee is doing ok. (Lundy)       No recent labs.    Stopped taking myrbetriq due to cost of being in the donut hole. Having to wear a pad just in case. Taking otc meds   -----------------------------------------------------  Continues to see Dr. Reza for her glaucoma.  Mammogram was 5/2024; DEXA 5/2024, osteoporosis,on risendronate. (intolerance to Boniva, jaw problems and N/V)  Cscope  5/2020 with Dr. Almeida, polyps, to repeat in 5 yrs        No visits with results within 6 Month(s) from this visit.   Latest known visit with results is:   Office Visit on 09/11/2023   Component Date Value Ref Range Status    TSH w/reflex to FT4 03/04/2024 2.79  0.40 - 4.50 mIU/L Final    Color, UA 03/04/2024 YELLOW  YELLOW Final    Appearance, UA 03/04/2024 CLOUDY (A)  CLEAR Final    Specific Gravity, UA 03/04/2024 1.013  1.001 - 1.035 Final    pH, UA 03/04/2024 8.0  5.0 - 8.0 Final    Glucose, UA 03/04/2024 NEGATIVE  NEGATIVE Final    Bilirubin, UA 03/04/2024 NEGATIVE  NEGATIVE Final    Ketones, UA 03/04/2024 NEGATIVE  NEGATIVE Final    Occult Blood UA 03/04/2024 NEGATIVE  NEGATIVE Final    Protein, UA 03/04/2024 NEGATIVE  NEGATIVE Final    Nitrite, UA 03/04/2024 NEGATIVE  NEGATIVE Final    Leukocytes, UA 03/04/2024 NEGATIVE  NEGATIVE Final    WBC Casts, UA 03/04/2024 NONE SEEN  < OR = 5  /HPF Final    RBC Casts, UA 03/04/2024 0-2  < OR = 2 /HPF Final    Squam Epithel, UA 03/04/2024 NONE SEEN  < OR = 5 /HPF Final    Bacteria, UA 03/04/2024 NONE SEEN  NONE SEEN /HPF Final    Hyaline Casts, UA 03/04/2024 NONE SEEN  NONE SEEN /LPF Final    Service Cmt: 03/04/2024    Final    Comment: This urine was analyzed for the presence of WBC,   RBC, bacteria, casts, and other formed elements.   Only those elements seen were reported.            Reflexive Urine Culture 03/04/2024    Final    NO CULTURE INDICATED    WBC 03/04/2024 5.1  3.8 - 10.8 Thousand/uL Final    RBC 03/04/2024 5.09  3.80 - 5.10 Million/uL Final    Hemoglobin 03/04/2024 14.9  11.7 - 15.5 g/dL Final    Hematocrit 03/04/2024 45.0  35.0 - 45.0 % Final    MCV 03/04/2024 88.4  80.0 - 100.0 fL Final    MCH 03/04/2024 29.3  27.0 - 33.0 pg Final    MCHC 03/04/2024 33.1  32.0 - 36.0 g/dL Final    RDW 03/04/2024 12.6  11.0 - 15.0 % Final    Platelets 03/04/2024 332  140 - 400 Thousand/uL Final    MPV 03/04/2024 11.7  7.5 - 12.5 fL Final    Neutrophils, Abs 03/04/2024 2,672  1,500 - 7,800 cells/uL Final    Lymph # 03/04/2024 1,617  850 - 3,900 cells/uL Final    Mono # 03/04/2024 525  200 - 950 cells/uL Final    Eos # 03/04/2024 235  15 - 500 cells/uL Final    Baso # 03/04/2024 51  0 - 200 cells/uL Final    Neutrophils Relative 03/04/2024 52.4  % Final    Lymph % 03/04/2024 31.7  % Final    Mono % 03/04/2024 10.3  % Final    Eosinophil % 03/04/2024 4.6  % Final    Basophil % 03/04/2024 1.0  % Final    Cholesterol 03/04/2024 142  <200 mg/dL Final    HDL 03/04/2024 58  > OR = 50 mg/dL Final    Triglycerides 03/04/2024 88  <150 mg/dL Final    LDL Cholesterol 03/04/2024 67  mg/dL (calc) Final    Comment: Reference range: <100     Desirable range <100 mg/dL for primary prevention;    <70 mg/dL for patients with CHD or diabetic patients   with > or = 2 CHD risk factors.     LDL-C is now calculated using the Sarabjit-Plummer   calculation, which is a validated novel  method providing   better accuracy than the Friedewald equation in the   estimation of LDL-C.   Sarabjit SS et al. JOSIAS. 2013;310(19): 8105-8108   (http://education.Iizuu/faq/WZD833)      HDL/Cholesterol Ratio 03/04/2024 2.4  <5.0 (calc) Final    Non HDL Chol. (LDL+VLDL) 03/04/2024 84  <130 mg/dL (calc) Final    Comment: For patients with diabetes plus 1 major ASCVD risk   factor, treating to a non-HDL-C goal of <100 mg/dL   (LDL-C of <70 mg/dL) is considered a therapeutic   option.      Glucose 03/04/2024 85  65 - 99 mg/dL Final    Comment:               Fasting reference interval         BUN 03/04/2024 16  7 - 25 mg/dL Final    Creatinine 03/04/2024 0.75  0.60 - 1.00 mg/dL Final    eGFR 03/04/2024 83  > OR = 60 mL/min/1.73m2 Final    BUN/Creatinine Ratio 03/04/2024 SEE NOTE:  6 - 22 (calc) Final    Comment:    Not Reported: BUN and Creatinine are within     reference range.            Sodium 03/04/2024 140  135 - 146 mmol/L Final    Potassium 03/04/2024 4.4  3.5 - 5.3 mmol/L Final    Chloride 03/04/2024 104  98 - 110 mmol/L Final    CO2 03/04/2024 27  20 - 32 mmol/L Final    Calcium 03/04/2024 9.8  8.6 - 10.4 mg/dL Final    Total Protein 03/04/2024 7.0  6.1 - 8.1 g/dL Final    Albumin 03/04/2024 4.5  3.6 - 5.1 g/dL Final    Globulin, Total 03/04/2024 2.5  1.9 - 3.7 g/dL (calc) Final    Albumin/Globulin Ratio 03/04/2024 1.8  1.0 - 2.5 (calc) Final    Total Bilirubin 03/04/2024 0.5  0.2 - 1.2 mg/dL Final    Alkaline Phosphatase 03/04/2024 70  37 - 153 U/L Final    AST 03/04/2024 18  10 - 35 U/L Final    ALT 03/04/2024 21  6 - 29 U/L Final    Creatinine, Urine 03/04/2024 78  20 - 275 mg/dL Final    Microalb, Ur 03/04/2024 0.4  See Note: mg/dL Final    Comment: Reference Range:    Reference Range  Not established      Microalb/Creat Ratio 03/04/2024 5  <30 mcg/mg creat Final    Comment:    The ADA defines abnormalities in albumin  excretion as follows:     Albuminuria Category        Result (mcg/mg  creatinine)     Normal to Mildly increased   <30  Moderately increased            Severely increased           > OR = 300     The ADA recommends that at least two of three  specimens collected within a 3-6 month period be  abnormal before considering a patient to be  within a diagnostic category.             Past Medical History:   Diagnosis Date    Basal cell carcinoma 2005    R upper cut lip    Hyperlipidemia     Osteopenia      Social History     Socioeconomic History    Marital status:    Tobacco Use    Smoking status: Former    Smokeless tobacco: Never   Substance and Sexual Activity    Alcohol use: Yes     Comment: socially    Drug use: No    Sexual activity: Yes     Partners: Male     Social Determinants of Health     Financial Resource Strain: Low Risk  (5/20/2024)    Received from [facility]    Overall Financial Resource Strain (CARDIA)     Difficulty of Paying Living Expenses: Not hard at all   Food Insecurity: No Food Insecurity (5/20/2024)    Received from [facility]    Hunger Vital Sign     Worried About Running Out of Food in the Last Year: Never true     Ran Out of Food in the Last Year: Never true   Transportation Needs: No Transportation Needs (5/20/2024)    Received from [facility]    PRAPARE - Transportation     Lack of Transportation (Medical): No     Lack of Transportation (Non-Medical): No   Physical Activity: Sufficiently Active (5/20/2024)    Received from [facility]    Exercise Vital Sign     Days of Exercise per Week: 5 days     Minutes of Exercise per Session: 30 min   Stress: No Stress Concern Present (5/20/2024)    Received from [facility] [place] [employer]    [employer] [place] [employer] - Occupational Stress Questionnaire     Feeling of Stress : Not at all   Housing Stability: Low Risk  (3/9/2024)    Housing Stability Vital Sign     Unable to Pay for Housing in the Last Year: No     Number of Places Lived in the Last Year: 1     Unstable Housing in the Last Year: No      Past Surgical History:   Procedure Laterality Date    basil cell carcinoma removal      BREAST CYST ASPIRATION      CARPAL TUNNEL RELEASE Right 05/01/2024    CARPAL TUNNEL RELEASE Left 08/13/2024    HYSTERECTOMY      SINUS SURGERY      TOTAL KNEE ARTHROPLASTY Right 06/27/2023     Family History   Problem Relation Name Age of Onset    Cancer Mother      Melanoma Neg Hx      Psoriasis Neg Hx      Lupus Neg Hx      Eczema Neg Hx         Review of patient's allergies indicates:   Allergen Reactions    Alendronic acid     Boniva [ibandronate]      Nausea and vomitting    Fosamax [alendronate] Nausea And Vomiting    Hydrocodone-acetaminophen     Hydrocodone-cpm-pseudoephed     Lovastatin     Oxycodone     Rosuvastatin     Solifenacin Other (See Comments)       Current Outpatient Medications:     ascorbic acid, vitamin C, (VITAMIN C) 500 MG tablet, Take 500 mg by mouth 2 (two) times daily., Disp: , Rfl:     aspirin (ECOTRIN) 81 MG EC tablet, Take 81 mg by mouth once daily., Disp: , Rfl:     azelastine (ASTELIN) 137 mcg (0.1 %) nasal spray, 1 spray (137 mcg total) by Nasal route 2 (two) times daily., Disp: 30 mL, Rfl: 5    b complex vitamins tablet, Take 1 tablet by mouth once daily., Disp: , Rfl:     b complex vitamins tablet, Take 1 tablet by mouth once daily., Disp: , Rfl:     calcium citrate (CALCITRATE) 200 mg (950 mg) tablet, Take 1 tablet by mouth once daily., Disp: , Rfl:     cetirizine (ZYRTEC) 10 MG tablet, Take 10 mg by mouth once daily., Disp: , Rfl:     co-enzyme Q-10 30 mg capsule, Take 100 mg by mouth 2 (two) times daily., Disp: , Rfl:     ergocalciferol (VITAMIN D2) 50,000 unit Cap, Take 50,000 Units by mouth every 7 days., Disp: , Rfl:     flaxseed oiL 1,000 mg Cap, Take by mouth., Disp: , Rfl:     folic acid (FOLVITE) 400 MCG tablet, Take 400 mcg by mouth once daily., Disp: , Rfl:     hydroCHLOROthiazide (HYDRODIURIL) 12.5 MG Tab, Take 1 tablet (12.5 mg total) by mouth once daily., Disp: 90 tablet,  Rfl: 3    latanoprost, PF, 0.005 % Drop, Apply to eye., Disp: , Rfl:     LIVALO 4 mg Tab, Take 1 tablet by mouth once daily. For 30 days, Disp: , Rfl:     lysine (L-LYSINE) 500 mg Tab, Take 500 mg by mouth once daily., Disp: , Rfl:     magnesium 30 mg Tab, Take by mouth once., Disp: , Rfl:     mirabegron (MYRBETRIQ) 25 mg Tb24 ER tablet, Take 1 tablet (25 mg total) by mouth once daily., Disp: 30 tablet, Rfl: 5    omega-3 fatty acids/fish oil (FISH OIL-OMEGA-3 FATTY ACIDS) 300-1,000 mg capsule, Take by mouth once daily., Disp: , Rfl:     potassium bicarbonate (K-LYTE) disintegrating tablet, Take 99 mEq by mouth 2 (two) times daily., Disp: , Rfl:     risedronate (ACTONEL) 35 MG tablet, Take 1 tablet (35 mg total) by mouth every 7 days., Disp: 12 tablet, Rfl: 3    VITAMIN K2, MK-4, ORAL, Take by mouth., Disp: , Rfl:     zinc gluconate 50 mg tablet, Take 50 mg by mouth once daily., Disp: , Rfl:     Review of Systems   Constitutional:  Negative for activity change, appetite change, fatigue, fever and unexpected weight change.   HENT:  Negative for ear pain, hearing loss, postnasal drip, rhinorrhea, sinus pain, sore throat and trouble swallowing.    Eyes:  Negative for discharge and visual disturbance.   Respiratory:  Negative for cough, chest tightness, shortness of breath and wheezing.    Cardiovascular:  Negative for chest pain, palpitations and leg swelling.   Gastrointestinal:  Negative for abdominal pain, blood in stool, constipation, diarrhea, nausea and vomiting.        -heartburn   Endocrine: Positive for polyuria. Negative for polydipsia.   Genitourinary:  Negative for difficulty urinating, dysuria, frequency, hematuria and menstrual problem.   Musculoskeletal:  Negative for arthralgias, back pain, joint swelling, myalgias and neck pain.   Skin:  Negative for rash.   Neurological:  Negative for weakness, numbness and headaches.   Hematological:  Does not bruise/bleed easily.   Psychiatric/Behavioral:  Negative  "for confusion, dysphoric mood, sleep disturbance and suicidal ideas. The patient is not nervous/anxious.           Objective:      Vitals:    09/23/24 1208 09/23/24 1219 09/23/24 1234   BP: (!) 150/72 (!) 150/82 (!) 140/88   Pulse: 66     Weight: 66.7 kg (147 lb)     Height: 5' 1.5" (1.562 m)           Wt Readings from Last 3 Encounters:   09/23/24 66.7 kg (147 lb)   05/24/24 64.9 kg (143 lb 1.3 oz)   05/01/24 64.9 kg (143 lb)         Physical Exam  Vitals reviewed.   Constitutional:       General: She is not in acute distress.     Appearance: Normal appearance. She is well-developed and overweight.   HENT:      Head: Normocephalic and atraumatic.   Neck:      Thyroid: No thyromegaly.   Cardiovascular:      Rate and Rhythm: Normal rate and regular rhythm.      Heart sounds: Normal heart sounds. No murmur heard.     No friction rub.   Pulmonary:      Effort: Pulmonary effort is normal.      Breath sounds: Normal breath sounds. No wheezing or rales.   Abdominal:      General: Bowel sounds are normal. There is no distension.      Palpations: Abdomen is soft.      Tenderness: There is no abdominal tenderness.   Musculoskeletal:      Cervical back: Neck supple.   Lymphadenopathy:      Cervical: No cervical adenopathy.   Skin:     General: Skin is warm and dry.      Findings: No rash.   Neurological:      Mental Status: She is alert and oriented to person, place, and time.   Psychiatric:         Attention and Perception: She is attentive.         Speech: Speech normal.         Behavior: Behavior normal.         Thought Content: Thought content normal.         Judgment: Judgment normal.           Assessment:       1. Essential hypertension    2. Gastroesophageal reflux disease without esophagitis    3. Urge incontinence    4. Influenza vaccine administered    5. Long-term use of high-risk medication               Plan:       Essential hypertension  Comments:  Borderline. Will continue to monitor BP on current medication " regimen    Gastroesophageal reflux disease without esophagitis  Comments:  Controlled. Will continue to monitor symptoms    Urge incontinence  Comments:  Symptomatic. To resume myrbetriq at the beginning of the year. Will continue to monitor symptoms    Influenza vaccine administered    Long-term use of high-risk medication  -     TSH w/reflex to FT4; Future; Expected date: 09/23/2024  -     Urinalysis, Reflex to Urine Culture Urine, Clean Catch; Future; Expected date: 09/23/2024  -     CBC Auto Differential; Future; Expected date: 09/23/2024  -     Lipid Panel; Future; Expected date: 09/23/2024  -     Comprehensive Metabolic Panel; Future; Expected date: 09/23/2024  -     Microalbumin/Creatinine Ratio, Urine; Future; Expected date: 09/23/2024      Labs and/or tests have been ordered for the evaluation/monitoring of acute/chronic conditions, to be done just before next visit.    Follow up in about 6 months (around 3/23/2025) for HTN.        9/23/2024 Jeanmarie Yoder

## 2024-09-23 ENCOUNTER — OFFICE VISIT (OUTPATIENT)
Dept: FAMILY MEDICINE | Facility: CLINIC | Age: 74
End: 2024-09-23
Payer: MEDICARE

## 2024-09-23 VITALS
DIASTOLIC BLOOD PRESSURE: 88 MMHG | BODY MASS INDEX: 27.05 KG/M2 | WEIGHT: 147 LBS | SYSTOLIC BLOOD PRESSURE: 140 MMHG | HEIGHT: 62 IN | HEART RATE: 66 BPM

## 2024-09-23 DIAGNOSIS — K21.9 GASTROESOPHAGEAL REFLUX DISEASE WITHOUT ESOPHAGITIS: ICD-10-CM

## 2024-09-23 DIAGNOSIS — I10 ESSENTIAL HYPERTENSION: Primary | ICD-10-CM

## 2024-09-23 DIAGNOSIS — N39.41 URGE INCONTINENCE: ICD-10-CM

## 2024-09-23 DIAGNOSIS — Z79.899 LONG-TERM USE OF HIGH-RISK MEDICATION: ICD-10-CM

## 2024-09-23 DIAGNOSIS — Z23 INFLUENZA VACCINE ADMINISTERED: ICD-10-CM

## 2024-09-23 PROCEDURE — 1101F PT FALLS ASSESS-DOCD LE1/YR: CPT | Mod: CPTII,S$GLB,, | Performed by: FAMILY MEDICINE

## 2024-09-23 PROCEDURE — 3079F DIAST BP 80-89 MM HG: CPT | Mod: CPTII,S$GLB,, | Performed by: FAMILY MEDICINE

## 2024-09-23 PROCEDURE — 99213 OFFICE O/P EST LOW 20 MIN: CPT | Mod: S$GLB,,, | Performed by: FAMILY MEDICINE

## 2024-09-23 PROCEDURE — 1160F RVW MEDS BY RX/DR IN RCRD: CPT | Mod: CPTII,S$GLB,, | Performed by: FAMILY MEDICINE

## 2024-09-23 PROCEDURE — 3077F SYST BP >= 140 MM HG: CPT | Mod: CPTII,S$GLB,, | Performed by: FAMILY MEDICINE

## 2024-09-23 PROCEDURE — 1159F MED LIST DOCD IN RCRD: CPT | Mod: CPTII,S$GLB,, | Performed by: FAMILY MEDICINE

## 2024-09-23 PROCEDURE — 3288F FALL RISK ASSESSMENT DOCD: CPT | Mod: CPTII,S$GLB,, | Performed by: FAMILY MEDICINE

## 2025-01-06 ENCOUNTER — TELEPHONE (OUTPATIENT)
Dept: FAMILY MEDICINE | Facility: CLINIC | Age: 75
End: 2025-01-06
Payer: MEDICARE

## 2025-01-06 NOTE — TELEPHONE ENCOUNTER
----- Message from Nati sent at 1/6/2025 10:43 AM CST -----  Pt osteoporosis medication is going up and she can not afford it. Would like to know what to do   [phone]

## 2025-01-13 ENCOUNTER — TELEPHONE (OUTPATIENT)
Dept: FAMILY MEDICINE | Facility: CLINIC | Age: 75
End: 2025-01-13
Payer: MEDICARE

## 2025-01-13 NOTE — TELEPHONE ENCOUNTER
----- Message from Juany sent at 1/10/2025  4:08 PM CST -----  Type:   Call    Who Called:Alex/Kate ARROYO  Does the patient know what this is regarding?:see below  Would the patient rather a call back or a response via [facility]? call  Best Call Back Number:[phone]  Ref #[id number]  Additional Information: Clinical questions about risedronate (ACTONEL) 35 MG tablet. Pleas respond before 01/11/25 at 8:00 am

## 2025-01-14 NOTE — TELEPHONE ENCOUNTER
Did we let them know that pt was unable to tolerate the meds that are on tier 1, such as alendronate, etc due to N/V. They are listed on her allergy list.

## 2025-01-27 ENCOUNTER — OFFICE VISIT (OUTPATIENT)
Dept: DERMATOLOGY | Facility: CLINIC | Age: 75
End: 2025-01-27
Payer: MEDICARE

## 2025-01-27 VITALS — BODY MASS INDEX: 27.06 KG/M2 | HEIGHT: 62 IN | WEIGHT: 147.06 LBS

## 2025-01-27 DIAGNOSIS — D18.01 CHERRY ANGIOMA: ICD-10-CM

## 2025-01-27 DIAGNOSIS — Z08 ENCOUNTER FOR FOLLOW-UP SURVEILLANCE OF SKIN CANCER: ICD-10-CM

## 2025-01-27 DIAGNOSIS — D22.9 MULTIPLE BENIGN NEVI: ICD-10-CM

## 2025-01-27 DIAGNOSIS — L82.1 SEBORRHEIC KERATOSES: ICD-10-CM

## 2025-01-27 DIAGNOSIS — L57.0 ACTINIC KERATOSES: Primary | ICD-10-CM

## 2025-01-27 DIAGNOSIS — L81.4 SOLAR LENTIGO: ICD-10-CM

## 2025-01-27 DIAGNOSIS — Z85.828 ENCOUNTER FOR FOLLOW-UP SURVEILLANCE OF SKIN CANCER: ICD-10-CM

## 2025-01-27 DIAGNOSIS — L57.8 ACTINIC SKIN DAMAGE: ICD-10-CM

## 2025-01-27 PROCEDURE — 3288F FALL RISK ASSESSMENT DOCD: CPT | Mod: CPTII,S$GLB,, | Performed by: DERMATOLOGY

## 2025-01-27 PROCEDURE — 17003 DESTRUCT PREMALG LES 2-14: CPT | Mod: S$GLB,,, | Performed by: DERMATOLOGY

## 2025-01-27 PROCEDURE — 1160F RVW MEDS BY RX/DR IN RCRD: CPT | Mod: CPTII,S$GLB,, | Performed by: DERMATOLOGY

## 2025-01-27 PROCEDURE — 1159F MED LIST DOCD IN RCRD: CPT | Mod: CPTII,S$GLB,, | Performed by: DERMATOLOGY

## 2025-01-27 PROCEDURE — 1101F PT FALLS ASSESS-DOCD LE1/YR: CPT | Mod: CPTII,S$GLB,, | Performed by: DERMATOLOGY

## 2025-01-27 PROCEDURE — 17000 DESTRUCT PREMALG LESION: CPT | Mod: S$GLB,,, | Performed by: DERMATOLOGY

## 2025-01-27 PROCEDURE — 99213 OFFICE O/P EST LOW 20 MIN: CPT | Mod: 25,S$GLB,, | Performed by: DERMATOLOGY

## 2025-01-27 NOTE — PROGRESS NOTES
Subjective:      Patient ID:  Shahana Mcknight is a 74 y.o. female who presents for   Chief Complaint   Patient presents with    Skin Check     TBSC     LOV 10/6/23 ISK, SK, Angioma, Benign Nevi    Patient here today for TBSC.  C/O multiple spots to body none of which are painful or bleeding.     Derm hx:   Hx of BCC x's 2 on face within six months of each other treated with MOHS (Dr. Martinez) and plastics to closed next day Nov 2005- Venkatesh Montero   Hx of BCC right leg treated with surgery by derm in [place] about 2010s.     Current Outpatient Medications:   ·  ascorbic acid, vitamin C, (VITAMIN C) 500 MG tablet, Take 500 mg by mouth 2 (two) times daily., Disp: , Rfl:   ·  aspirin (ECOTRIN) 81 MG EC tablet, Take 81 mg by mouth once daily., Disp: , Rfl:   ·  b complex vitamins tablet, Take 1 tablet by mouth once daily., Disp: , Rfl:   ·  b complex vitamins tablet, Take 1 tablet by mouth once daily., Disp: , Rfl:   ·  calcium citrate (CALCITRATE) 200 mg (950 mg) tablet, Take 1 tablet by mouth once daily., Disp: , Rfl:   ·  cetirizine (ZYRTEC) 10 MG tablet, Take 10 mg by mouth once daily., Disp: , Rfl:   ·  co-enzyme Q-10 30 mg capsule, Take 100 mg by mouth 2 (two) times daily., Disp: , Rfl:   ·  ergocalciferol (VITAMIN D2) 50,000 unit Cap, Take 50,000 Units by mouth every 7 days., Disp: , Rfl:   ·  flaxseed oiL 1,000 mg Cap, Take by mouth., Disp: , Rfl:   ·  folic acid (FOLVITE) 400 MCG tablet, Take 400 mcg by mouth once daily., Disp: , Rfl:   ·  hydroCHLOROthiazide (HYDRODIURIL) 12.5 MG Tab, Take 1 tablet (12.5 mg total) by mouth once daily., Disp: 90 tablet, Rfl: 3  ·  latanoprost, PF, 0.005 % Drop, Apply to eye., Disp: , Rfl:   ·  LIVALO 4 mg Tab, Take 1 tablet by mouth once daily. For 30 days, Disp: , Rfl:   ·  lysine (L-LYSINE) 500 mg Tab, Take 500 mg by mouth once daily., Disp: , Rfl:   ·  magnesium 30 mg Tab, Take by mouth once., Disp: , Rfl:   ·  omega-3 fatty acids/fish oil (FISH OIL-OMEGA-3 FATTY ACIDS)  300-1,000 mg capsule, Take by mouth once daily., Disp: , Rfl:   ·  potassium bicarbonate (K-LYTE) disintegrating tablet, Take 99 mEq by mouth 2 (two) times daily., Disp: , Rfl:   ·  risedronate (ACTONEL) 35 MG tablet, Take 1 tablet (35 mg total) by mouth every 7 days., Disp: 12 tablet, Rfl: 3  ·  VITAMIN K2, MK-4, ORAL, Take by mouth., Disp: , Rfl:   ·  zinc gluconate 50 mg tablet, Take 50 mg by mouth once daily., Disp: , Rfl:   ·  azelastine (ASTELIN) 137 mcg (0.1 %) nasal spray, 1 spray (137 mcg total) by Nasal route 2 (two) times daily., Disp: 30 mL, Rfl: 5      Review of Systems   Constitutional:  Negative for fever, chills and fatigue.   Respiratory:  Negative for cough and shortness of breath.    Skin:  Positive for dry skin, daily sunscreen use, activity-related sunscreen use and wears hat. Negative for itching and rash.       Objective:   Physical Exam   Constitutional: She appears well-developed and well-nourished. No distress.   Eyes: Lids are normal.  No conjunctival no injection.   Cardiovascular:  There is no local extremity swelling and no dependent edema.             Neurological: She is alert and oriented to person, place, and time. She is not disoriented.   Psychiatric: She has a normal mood and affect.   Skin:   Areas Examined (abnormalities noted in diagram):   Scalp / Hair Palpated and Inspected  Head / Face Inspection Performed  Neck Inspection Performed  Chest / Axilla Inspection Performed  Abdomen Inspection Performed  Genitals / Buttocks / Groin Inspection Performed  Back Inspection Performed  RUE Inspected  LUE Inspection Performed  RLE Inspected  LLE Inspection Performed  Nails and Digits Inspection Performed                         Diagram Legend     Erythematous scaling macule/papule c/w actinic keratosis       Vascular papule c/w angioma      Pigmented verrucoid papule/plaque c/w seborrheic keratosis      Yellow umbilicated papule c/w sebaceous hyperplasia      Irregularly shaped tan  macule c/w lentigo     1-2 mm smooth white papules consistent with Milia      Movable subcutaneous cyst with punctum c/w epidermal inclusion cyst      Subcutaneous movable cyst c/w pilar cyst      Firm pink to brown papule c/w dermatofibroma      Pedunculated fleshy papule(s) c/w skin tag(s)      Evenly pigmented macule c/w junctional nevus     Mildly variegated pigmented, slightly irregular-bordered macule c/w mildly atypical nevus      Flesh colored to evenly pigmented papule c/w intradermal nevus       Pink pearly papule/plaque c/w basal cell carcinoma      Erythematous hyperkeratotic cursted plaque c/w SCC      Surgical scar with no sign of skin cancer recurrence      Open and closed comedones      Inflammatory papules and pustules      Verrucoid papule consistent consistent with wart     Erythematous eczematous patches and plaques     Dystrophic onycholytic nail with subungual debris c/w onychomycosis     Umbilicated papule    Erythematous-base heme-crusted tan verrucoid plaque consistent with inflamed seborrheic keratosis     Erythematous Silvery Scaling Plaque c/w Psoriasis     See annotation      Assessment / Plan:        Actinic keratoses  Cryosurgery Procedure Note    Verbal consent from the patient is obtained and the patient is aware of the precancerous quality and need for treatment of these lesions. Liquid nitrogen cryosurgery is applied to the 13 actinic keratoses, as detailed in the physical exam, to produce a freeze injury. The patient is aware that blisters may form and is instructed on wound care with gentle cleansing and use of vaseline ointment to keep moist until healed. The patient is supplied a handout on cryosurgery and is instructed to call if lesions do not completely resolve.    Encounter for follow-up surveillance of skin cancer  Area of previous NMSCs examined. Sites well healed with no signs of recurrence.    Total body skin examination performed today including at least 12 points as  noted in physical examination. No lesions suspicious for malignancy noted.    Seborrheic keratoses  These are benign inherited growths without a malignant potential. Reassurance given to patient. No treatment is necessary.     Multiple benign nevi  Careful dermoscopy evaluation of nevi performed with none identified as needing biopsy today  Monitor for new mole or moles that are becoming bigger, darker, irritated, or developing irregular borders.     Solar lentigo  This is a benign hyperpigmented sun induced lesion. Daily sun protection will reduce the number of new lesions. Treatment of these benign lesions are considered cosmetic.    Cherry angioma  This is a benign vascular lesion. Reassurance given. No treatment required.     Actinic skin damage  Patient instructed in importance in daily broad spectrum sun protection of at least spf 30. Mineral sunscreen ingredients preferred (Zinc +/- Titanium) and can be found OTC.   Patient encouraged to wear hat for all outdoor exposure.   Also discussed sun avoidance and use of protective clothing.             No follow-ups on file.

## 2025-01-30 ENCOUNTER — TELEPHONE (OUTPATIENT)
Dept: FAMILY MEDICINE | Facility: CLINIC | Age: 75
End: 2025-01-30
Payer: MEDICARE

## 2025-01-30 NOTE — TELEPHONE ENCOUNTER
----- Message from Isabella sent at 1/30/2025 10:34 AM CST -----  Tier exception Appeal #[id number] for Risedronate was approved for 1/8/25 - 12/31/25. Thank you

## 2025-03-10 ENCOUNTER — PATIENT MESSAGE (OUTPATIENT)
Dept: ADMINISTRATIVE | Facility: HOSPITAL | Age: 75
End: 2025-03-10
Payer: MEDICARE

## 2025-03-13 ENCOUNTER — TELEPHONE (OUTPATIENT)
Dept: FAMILY MEDICINE | Facility: CLINIC | Age: 75
End: 2025-03-13
Payer: MEDICARE

## 2025-03-20 ENCOUNTER — TELEPHONE (OUTPATIENT)
Dept: FAMILY MEDICINE | Facility: CLINIC | Age: 75
End: 2025-03-20
Payer: MEDICARE

## 2025-03-24 DIAGNOSIS — Z00.00 ENCOUNTER FOR MEDICARE ANNUAL WELLNESS EXAM: ICD-10-CM

## 2025-03-27 ENCOUNTER — OFFICE VISIT (OUTPATIENT)
Dept: FAMILY MEDICINE | Facility: CLINIC | Age: 75
End: 2025-03-27
Payer: MEDICARE

## 2025-03-27 VITALS
WEIGHT: 146.63 LBS | BODY MASS INDEX: 25.98 KG/M2 | TEMPERATURE: 98 F | HEIGHT: 63 IN | OXYGEN SATURATION: 98 % | DIASTOLIC BLOOD PRESSURE: 84 MMHG | SYSTOLIC BLOOD PRESSURE: 138 MMHG | HEART RATE: 67 BPM

## 2025-03-27 DIAGNOSIS — I10 ESSENTIAL HYPERTENSION: Primary | ICD-10-CM

## 2025-03-27 DIAGNOSIS — M81.0 AGE-RELATED OSTEOPOROSIS WITHOUT CURRENT PATHOLOGICAL FRACTURE: ICD-10-CM

## 2025-03-27 DIAGNOSIS — N39.41 URGE INCONTINENCE: ICD-10-CM

## 2025-03-27 DIAGNOSIS — E78.2 MIXED HYPERLIPIDEMIA: ICD-10-CM

## 2025-03-27 NOTE — PROGRESS NOTES
SUBJECTIVE:    Patient ID: Shahana Mcknight is a 74 y.o. female.    Chief Complaint: Follow-up (Follow up visit)      Pt here to checkup on acute and chronic conditions (HTN, Osteoporosis, Menopause, GERD, and hyperlipidemia, allergic rhinitis)    BP is doing ok today. Brings in a log today, and it ranges [phone] with a normal pulse.   Denies CP/SOB/HA. Has seen ([facility]) recently (every 3 months).      Heartburn has been better.  Watches diet, and avoid triggers    Allergies are doing good.  been using astelin as needed and zyrtec daily.           Had labs done: TSH 3.03, LDL 77, GFR 87    Still trying different things online to help her bladder that has not helped.  No longer using the myrbetriq due to cost of being in the donut hole. Having to wear a pad just in case. Tries to do the kegel exercises that don't seem to help either. Thinking about seeing a pelvic floor therapist but there are not many in [place].    -----------------------------------------------------  Continues to see Dr. Reza for her glaucoma.  Mammogram was 5/2024; DEXA 5/2024, osteoporosis,on risendronate. (intolerance to Boniva, jaw problems and N/V)  Cscope  5/2020 with Dr. Almeida, polyps, to repeat in 5 yrs  ([place])         No visits with results within 6 Month(s) from this visit.   Latest known visit with results is:   Office Visit on 09/23/2024   Component Date Value Ref Range Status    TSH w/reflex to FT4 03/18/2025 3.03  0.40 - 4.50 mIU/L Final    Color, UA 03/18/2025 YELLOW  YELLOW Final    Appearance, UA 03/18/2025 CLOUDY (A)  CLEAR Final    Specific Gravity, UA 03/18/2025 1.011  1.001 - 1.035 Final    pH, UA 03/18/2025 8.5 (H)  5.0 - 8.0 Final    Glucose, UA 03/18/2025 NEGATIVE  NEGATIVE Final    Bilirubin, UA 03/18/2025 NEGATIVE  NEGATIVE Final    Ketones, UA 03/18/2025 NEGATIVE  NEGATIVE Final    Occult Blood UA 03/18/2025 NEGATIVE  NEGATIVE Final    Protein, UA 03/18/2025 NEGATIVE  NEGATIVE Final    Nitrite, UA  03/18/2025 NEGATIVE  NEGATIVE Final    Leukocytes, UA 03/18/2025 NEGATIVE  NEGATIVE Final    WBC Casts, UA 03/18/2025 0-5  < OR = 5 /HPF Final    RBC Casts, UA 03/18/2025 NONE SEEN  < OR = 2 /HPF Final    Squam Epithel, UA 03/18/2025 NONE SEEN  < OR = 5 /HPF Final    Bacteria, UA 03/18/2025 MANY (A)  NONE SEEN /HPF Final    Hyaline Casts, UA 03/18/2025 NONE SEEN  NONE SEEN /LPF Final    Service Cmt: 03/18/2025 SEE COMMENT   Final    Comment: This urine was analyzed for the presence of WBC,   RBC, bacteria, casts, and other formed elements.   Only those elements seen were reported.            Reflexive Urine Culture 03/18/2025 SEE COMMENT   Final    NO CULTURE INDICATED    WBC 03/18/2025 5.3  3.8 - 10.8 Thousand/uL Final    RBC 03/18/2025 4.96  3.80 - 5.10 Million/uL Final    Hemoglobin 03/18/2025 14.8  11.7 - 15.5 g/dL Final    Hematocrit 03/18/2025 45.0  35.0 - 45.0 % Final    MCV 03/18/2025 90.7  80.0 - 100.0 fL Final    MCH 03/18/2025 29.8  27.0 - 33.0 pg Final    MCHC 03/18/2025 32.9  32.0 - 36.0 g/dL Final    Comment: For adults, a slight decrease in the calculated MCHC  value (in the range of 30 to 32 g/dL) is most likely  not clinically significant; however, it should be  interpreted with caution in correlation with other  red cell parameters and the patient's clinical  condition.      RDW 03/18/2025 12.1  11.0 - 15.0 % Final    Platelets 03/18/2025 354  140 - 400 Thousand/uL Final    MPV 03/18/2025 11.2  7.5 - 12.5 fL Final    Neutrophils, Abs 03/18/2025 2,406  1,500 - 7,800 cells/uL Final    Lymph # 03/18/2025 1,972  850 - 3,900 cells/uL Final    Mono # 03/18/2025 546  200 - 950 cells/uL Final    Eos # 03/18/2025 329  15 - 500 cells/uL Final    Baso # 03/18/2025 48  0 - 200 cells/uL Final    Neutrophils Relative 03/18/2025 45.4  % Final    Lymph % 03/18/2025 37.2  % Final    Mono % 03/18/2025 10.3  % Final    Eosinophil % 03/18/2025 6.2  % Final    Basophil % 03/18/2025 0.9  % Final    Cholesterol  03/18/2025 160  <200 mg/dL Final    HDL 03/18/2025 65  > OR = 50 mg/dL Final    Triglycerides 03/18/2025 93  <150 mg/dL Final    LDL Cholesterol 03/18/2025 77  mg/dL (calc) Final    Comment: Reference range: <100     Desirable range <100 mg/dL for primary prevention;    <70 mg/dL for patients with CHD or diabetic patients   with > or = 2 CHD risk factors.     LDL-C is now calculated using the MichaelPlummer   calculation, which is a validated novel method providing   better accuracy than the Friedewald equation in the   estimation of LDL-C.   Sarabjit BALDERAS et al. JOSIAS. 2013;310(19): 8994-2292   (http://education.[employer].AfterYes/faq/AAV609)      HDL/Cholesterol Ratio 03/18/2025 2.5  <5.0 (calc) Final    Non HDL Chol. (LDL+VLDL) 03/18/2025 95  <130 mg/dL (calc) Final    Comment: For patients with diabetes plus 1 major ASCVD risk   factor, treating to a non-HDL-C goal of <100 mg/dL   (LDL-C of <70 mg/dL) is considered a therapeutic   option.      Glucose 03/18/2025 93  65 - 99 mg/dL Final    Comment:               Fasting reference interval         BUN 03/18/2025 26 (H)  7 - 25 mg/dL Final    Creatinine 03/18/2025 0.72  0.60 - 1.00 mg/dL Final    eGFR 03/18/2025 87  > OR = 60 mL/min/1.73m2 Final    BUN/Creatinine Ratio 03/18/2025 36 (H)  6 - 22 (calc) Final    Sodium 03/18/2025 141  135 - 146 mmol/L Final    Potassium 03/18/2025 3.9  3.5 - 5.3 mmol/L Final    Chloride 03/18/2025 102  98 - 110 mmol/L Final    CO2 03/18/2025 29  20 - 32 mmol/L Final    Calcium 03/18/2025 9.9  8.6 - 10.4 mg/dL Final    Total Protein 03/18/2025 7.0  6.1 - 8.1 g/dL Final    Albumin 03/18/2025 4.5  3.6 - 5.1 g/dL Final    Globulin, Total 03/18/2025 2.5  1.9 - 3.7 g/dL (calc) Final    Albumin/Globulin Ratio 03/18/2025 1.8  1.0 - 2.5 (calc) Final    Total Bilirubin 03/18/2025 0.5  0.2 - 1.2 mg/dL Final    Alkaline Phosphatase 03/18/2025 71  37 - 153 U/L Final    AST 03/18/2025 17  10 - 35 U/L Final    ALT 03/18/2025 19  6 - 29 U/L Final     Creatinine, Urine 03/18/2025 43  20 - 275 mg/dL Final    Microalb, Ur 03/18/2025 0.6  See Note: mg/dL Final    Comment: Reference Range:    Reference Range  Not established      Microalb/Creat Ratio 03/18/2025 14  <30 mg/g creat Final    Comment:    The ADA defines abnormalities in albumin  excretion as follows:     Albuminuria Category        Result (mg/g creatinine)     Normal to Mildly increased   <30  Moderately increased            Severely increased           > OR = 300     The ADA recommends that at least two of three  specimens collected within a 3-6 month period be  abnormal before considering a patient to be  within a diagnostic category.             Past Medical History:   Diagnosis Date    Basal cell carcinoma 2005    R upper cut lip    Hyperlipidemia     Osteopenia      Social History     Socioeconomic History    Marital status:    Tobacco Use    Smoking status: Former    Smokeless tobacco: Never   Substance and Sexual Activity    Alcohol use: Yes     Comment: socially    Drug use: No    Sexual activity: Yes     Partners: Male     Social Drivers of Health     Financial Resource Strain: Low Risk  (5/20/2024)    Received from [facility]    Overall Financial Resource Strain (CARDIA)     Difficulty of Paying Living Expenses: Not hard at all   Food Insecurity: No Food Insecurity (5/20/2024)    Received from [facility]    Hunger Vital Sign     Worried About Running Out of Food in the Last Year: Never true     Ran Out of Food in the Last Year: Never true   Transportation Needs: No Transportation Needs (5/20/2024)    Received from [facility]    PRAPARE - Transportation     Lack of Transportation (Medical): No     Lack of Transportation (Non-Medical): No   Physical Activity: Sufficiently Active (5/20/2024)    Received from [facility]    Exercise Vital Sign     Days of Exercise per Week: 5 days     Minutes of Exercise per Session: 30 min   Stress: No Stress Concern Present (5/20/2024)    Received  from [facility] - Occupational Stress Questionnaire     Feeling of Stress : Not at all   Housing Stability: Low Risk  (3/9/2024)    Housing Stability Vital Sign     Unable to Pay for Housing in the Last Year: No     Number of Places Lived in the Last Year: 1     Unstable Housing in the Last Year: No     Past Surgical History:   Procedure Laterality Date    basil cell carcinoma removal      BREAST CYST ASPIRATION      CARPAL TUNNEL RELEASE Right 05/01/2024    CARPAL TUNNEL RELEASE Left 08/13/2024    HYSTERECTOMY      SINUS SURGERY      TOTAL KNEE ARTHROPLASTY Right 06/27/2023     Family History   Problem Relation Name Age of Onset    Cancer Mother      Melanoma Neg Hx      Psoriasis Neg Hx      Lupus Neg Hx      Eczema Neg Hx         Review of patient's allergies indicates:   Allergen Reactions    Alendronic acid     Boniva [ibandronate]      Nausea and vomitting    Fosamax [alendronate] Nausea And Vomiting    Hydrocodone-acetaminophen     Hydrocodone-cpm-pseudoephed     Lovastatin     Oxycodone     Rosuvastatin     Solifenacin Other (See Comments)       Current Outpatient Medications:     ascorbic acid, vitamin C, (VITAMIN C) 500 MG tablet, Take 500 mg by mouth 2 (two) times daily., Disp: , Rfl:     aspirin (ECOTRIN) 81 MG EC tablet, Take 81 mg by mouth once daily., Disp: , Rfl:     b complex vitamins tablet, Take 1 tablet by mouth once daily., Disp: , Rfl:     b complex vitamins tablet, Take 1 tablet by mouth once daily., Disp: , Rfl:     calcium citrate (CALCITRATE) 200 mg (950 mg) tablet, Take 1 tablet by mouth once daily., Disp: , Rfl:     cetirizine (ZYRTEC) 10 MG tablet, Take 10 mg by mouth once daily., Disp: , Rfl:     co-enzyme Q-10 30 mg capsule, Take 100 mg by mouth 2 (two) times daily., Disp: , Rfl:     ergocalciferol (VITAMIN D2) 50,000 unit Cap, Take 50,000 Units by mouth every 7 days., Disp: , Rfl:     flaxseed oiL 1,000 mg Cap, Take by mouth., Disp: , Rfl:      folic acid (FOLVITE) 400 MCG tablet, Take 400 mcg by mouth once daily., Disp: , Rfl:     hydroCHLOROthiazide (HYDRODIURIL) 12.5 MG Tab, Take 1 tablet (12.5 mg total) by mouth once daily., Disp: 90 tablet, Rfl: 3    latanoprost, PF, 0.005 % Drop, Apply to eye., Disp: , Rfl:     LIVALO 4 mg Tab, Take 1 tablet by mouth once daily. For 30 days, Disp: , Rfl:     lysine (L-LYSINE) 500 mg Tab, Take 500 mg by mouth once daily., Disp: , Rfl:     magnesium 30 mg Tab, Take by mouth once., Disp: , Rfl:     omega-3 fatty acids/fish oil (FISH OIL-OMEGA-3 FATTY ACIDS) 300-1,000 mg capsule, Take by mouth once daily., Disp: , Rfl:     potassium bicarbonate (K-LYTE) disintegrating tablet, Take 99 mEq by mouth 2 (two) times daily., Disp: , Rfl:     risedronate (ACTONEL) 35 MG tablet, Take 1 tablet (35 mg total) by mouth every 7 days., Disp: 12 tablet, Rfl: 3    VITAMIN K2, MK-4, ORAL, Take by mouth., Disp: , Rfl:     zinc gluconate 50 mg tablet, Take 50 mg by mouth once daily., Disp: , Rfl:     azelastine (ASTELIN) 137 mcg (0.1 %) nasal spray, 1 spray (137 mcg total) by Nasal route 2 (two) times daily., Disp: 30 mL, Rfl: 5    Review of Systems   Constitutional:  Negative for activity change, appetite change, fatigue, fever and unexpected weight change.   HENT:  Negative for ear pain, hearing loss, postnasal drip, rhinorrhea, sinus pain, sore throat and trouble swallowing.    Eyes:  Negative for discharge and visual disturbance.   Respiratory:  Negative for cough, chest tightness, shortness of breath and wheezing.    Cardiovascular:  Negative for chest pain, palpitations and leg swelling.   Gastrointestinal:  Negative for abdominal pain, blood in stool, constipation, diarrhea, nausea and vomiting.        -heartburn   Endocrine: Positive for polyuria. Negative for polydipsia.   Genitourinary:  Negative for difficulty urinating, dysuria, frequency, hematuria and menstrual problem.   Musculoskeletal:  Negative for arthralgias, back  "pain, joint swelling, myalgias and neck pain.   Skin:  Negative for rash.   Neurological:  Negative for weakness, numbness and headaches.   Hematological:  Does not bruise/bleed easily.   Psychiatric/Behavioral:  Negative for confusion, dysphoric mood, sleep disturbance and suicidal ideas. The patient is not nervous/anxious.           Objective:      Vitals:    03/27/25 0807   BP: 138/84   Pulse: 67   Temp: 97.7 °F (36.5 °C)   TempSrc: Oral   SpO2: 98%   Weight: 66.5 kg (146 lb 9.6 oz)   Height: 5' 2.5" (1.588 m)         Wt Readings from Last 3 Encounters:   03/27/25 66.5 kg (146 lb 9.6 oz)   01/27/25 66.7 kg (147 lb 0.8 oz)   09/23/24 66.7 kg (147 lb)         Physical Exam  Vitals reviewed.   Constitutional:       General: She is not in acute distress.     Appearance: Normal appearance. She is well-developed and overweight.   HENT:      Head: Normocephalic and atraumatic.   Neck:      Thyroid: No thyromegaly.   Cardiovascular:      Rate and Rhythm: Normal rate and regular rhythm.      Heart sounds: Normal heart sounds. No murmur heard.     No friction rub.   Pulmonary:      Effort: Pulmonary effort is normal.      Breath sounds: Normal breath sounds. No wheezing or rales.   Abdominal:      General: Bowel sounds are normal. There is no distension.      Palpations: Abdomen is soft.      Tenderness: There is no abdominal tenderness.   Musculoskeletal:      Cervical back: Neck supple.   Lymphadenopathy:      Cervical: No cervical adenopathy.   Skin:     General: Skin is warm and dry.      Findings: No rash.   Neurological:      Mental Status: She is alert and oriented to person, place, and time.   Psychiatric:         Attention and Perception: She is attentive.         Speech: Speech normal.         Behavior: Behavior normal.         Thought Content: Thought content normal.         Judgment: Judgment normal.           Assessment:       1. Essential hypertension    2. Urge incontinence    3. Age-related osteoporosis " without current pathological fracture    4. Mixed hyperlipidemia                 Plan:       Essential hypertension  Comments:  Controlled. Will continue to monitor BP on current medication regimen.    Urge incontinence  Comments:  Symptomatic. To consider seeing Urogyn again, or resuming prescription bladder agent if fails otc meds    Age-related osteoporosis without current pathological fracture  Comments:  Chronic. To continue calcium/Vitamin D supplementation and actonel.    Mixed hyperlipidemia  Comments:  Optimal. LDL 77. To continue livalo.        Other  Lab results discussed and reviewed with patient.    Follow up in about 6 months (around 9/27/2025) for HTN, OP, HLD.        3/27/2025 Jeanmarie Yoder

## 2025-04-02 DIAGNOSIS — M81.0 AGE-RELATED OSTEOPOROSIS WITHOUT CURRENT PATHOLOGICAL FRACTURE: ICD-10-CM

## 2025-04-02 RX ORDER — RISEDRONATE SODIUM 35 MG/1
35 TABLET, FILM COATED ORAL
Qty: 12 TABLET | Refills: 3 | Status: SHIPPED | OUTPATIENT
Start: 2025-04-02

## 2025-04-02 NOTE — TELEPHONE ENCOUNTER
----- Message from Hue sent at 4/2/2025 10:02 AM CDT -----  [phone]

## 2025-04-02 NOTE — TELEPHONE ENCOUNTER
The patient's prescription has been approved and sent to   [id number] - [place], [place] - [address]  [address]  [place] [address]  Phone: [phone] Fax: [phone]

## 2025-05-12 DIAGNOSIS — Z12.31 ENCOUNTER FOR SCREENING MAMMOGRAM FOR MALIGNANT NEOPLASM OF BREAST: Primary | ICD-10-CM

## 2025-05-26 ENCOUNTER — HOSPITAL ENCOUNTER (OUTPATIENT)
Dept: RADIOLOGY | Facility: HOSPITAL | Age: 75
Discharge: HOME | End: 2025-05-26
Attending: FAMILY MEDICINE
Payer: MEDICARE

## 2025-05-26 DIAGNOSIS — Z12.31 ENCOUNTER FOR SCREENING MAMMOGRAM FOR MALIGNANT NEOPLASM OF BREAST: ICD-10-CM

## 2025-05-26 PROCEDURE — 77063 BREAST TOMOSYNTHESIS BI: CPT | Mod: 26,,, | Performed by: RADIOLOGY

## 2025-05-26 PROCEDURE — 77067 SCR MAMMO BI INCL CAD: CPT | Mod: 26,,, | Performed by: RADIOLOGY

## 2025-05-26 PROCEDURE — 77067 SCR MAMMO BI INCL CAD: CPT | Mod: TC,PO

## 2025-07-30 DIAGNOSIS — R19.7 DIARRHEA, UNSPECIFIED TYPE: Primary | ICD-10-CM

## 2025-07-30 RX ORDER — DIPHENOXYLATE HYDROCHLORIDE AND ATROPINE SULFATE 2.5; .025 MG/1; MG/1
1 TABLET ORAL 4 TIMES DAILY PRN
Qty: 20 TABLET | Refills: 0 | Status: SHIPPED | OUTPATIENT
Start: 2025-07-30 | End: 2025-08-09

## 2025-07-30 NOTE — TELEPHONE ENCOUNTER
Per Dr. Paresh Bautista QID # 20 R 0  Hold mag and fish oil due to increased chances of diarrhea  Make sure to stay hydrated on HCTZ to not get dehydrated     Pt notified and voiced understanding.

## 2025-07-30 NOTE — TELEPHONE ENCOUNTER
The patient's prescription has been approved and sent to   [id number] - [place], [place] - [address].  [address]  [place] [address]  Phone: [phone] Fax: [phone]

## 2025-07-30 NOTE — TELEPHONE ENCOUNTER
----- Message from Med Assistant Tomlinson sent at 7/30/2025  8:35 AM CDT -----  Pt says she's been having really bad on and off diarrhea going on for 4 days no nausea no stomach ache and no fever pt is unable to come in due to having diarrhea     Pt # [phone]

## 2025-07-30 NOTE — TELEPHONE ENCOUNTER
Pt taking imodium and pepto. Has been going on for 4 days. Denies any recent antibiotics. No new foods. Denies N/V   Had colonoscopy in June. States its explosion diarrhea   Reports she is not christophe to come into the office